# Patient Record
Sex: FEMALE | Race: WHITE
[De-identification: names, ages, dates, MRNs, and addresses within clinical notes are randomized per-mention and may not be internally consistent; named-entity substitution may affect disease eponyms.]

---

## 2017-03-09 ENCOUNTER — HOSPITAL ENCOUNTER (OUTPATIENT)
Dept: HOSPITAL 47 - RADMAMWWP | Age: 72
Discharge: HOME | End: 2017-03-09
Payer: MEDICARE

## 2017-03-09 DIAGNOSIS — Z12.31: Primary | ICD-10-CM

## 2017-03-09 PROCEDURE — 77063 BREAST TOMOSYNTHESIS BI: CPT

## 2017-03-13 NOTE — MM
Reason for exam: screening  (asymptomatic).

Last mammogram was performed 1 year and 1 month ago.



History:

Patient is postmenopausal.

Family history of breast cancer in aunt.

Took hormonal contraceptives for 3 years beginning at age 23.



Physical Findings:

A clinical breast exam by your physician is recommended on an annual basis and 

results should be correlated with mammographic findings.



MG 3D Screening Mammo W/Cad

Bilateral CC and MLO view(s) were taken.

Prior study comparison: February 10, 2016, bilateral MG 3d screening mammo w/cad. 

December 30, 2014, bilateral MG screening mammo w CAD.  Jen 3, 2013, bilateral 

digital screening mammo w/CAD.  April 29, 2011, bilateral digital screening mammo 

w/CAD.

The breast tissue is heterogeneously dense. This may lower the sensitivity of 

mammography.  There is chronic nodularity in the left breast. Asymmetric density 

far posteriorly and inferiorly in the right breast incompletely disperses.





ASSESSMENT: Incomplete: need additional imaging evaluation, BI-RAD 0



RECOMMENDATION:

Special view mammogram of the right breast.



If lesion persists on supplemental views, image directed ultrasound is 

recommended.



[facility] will attempt to contact patient to return for supplemental 

views and ultrasound if indicated.

## 2017-03-14 ENCOUNTER — HOSPITAL ENCOUNTER (OUTPATIENT)
Dept: HOSPITAL 47 - RADMAMWWP | Age: 72
End: 2017-03-14
Payer: MEDICARE

## 2017-03-14 DIAGNOSIS — R92.8: Primary | ICD-10-CM

## 2017-03-14 NOTE — MM
Reason for exam: additional evaluation requested from abnormal screening.

Last mammogram was performed less than 1 month ago.



History:

Patient is postmenopausal and has history of other cancer at age 70.

Family history of prostate cancer in cousin and breast cancer in aunt.

Took hormonal contraceptives for 3 years beginning at age 23.



Physical Findings:

Nurse Summary: thickening/palpable in the right breast at 9 o'clock (nurse kp).



MG 3D Work Up W/Cad RT

MLO and LM view(s) were taken of the right breast.

Prior study comparison: March 9, 2017, bilateral MG 3d screening mammo w/cad.  

February 10, 2016, bilateral MG 3d screening mammo w/cad.  December 30, 2014, 

bilateral MG screening mammo w CAD.

Asymmetric density does not persist as distinct lesion on additional views.



These results were verbally communicated with the patient and result sheet given 

to the patient on 3/14/17.





ASSESSMENT: Benign, BI-RAD 2



RECOMMENDATION:

Return to routine screening mammogram schedule for both breasts.

## 2017-11-06 ENCOUNTER — HOSPITAL ENCOUNTER (OUTPATIENT)
Dept: HOSPITAL 47 - RADCTMAIN | Age: 72
Discharge: HOME | End: 2017-11-06
Payer: MEDICARE

## 2017-11-06 DIAGNOSIS — C56.9: Primary | ICD-10-CM

## 2017-11-06 DIAGNOSIS — E27.9: ICD-10-CM

## 2017-11-06 DIAGNOSIS — K76.9: ICD-10-CM

## 2017-11-06 LAB
BUN SERPL-SCNC: 14 MG/DL (ref 7–17)
NON-AFRICAN AMERICAN GFR(MDRD): >60

## 2017-11-06 PROCEDURE — 82565 ASSAY OF CREATININE: CPT

## 2017-11-06 PROCEDURE — 71260 CT THORAX DX C+: CPT

## 2017-11-06 PROCEDURE — 74177 CT ABD & PELVIS W/CONTRAST: CPT

## 2017-11-06 PROCEDURE — 36415 COLL VENOUS BLD VENIPUNCTURE: CPT

## 2017-11-06 PROCEDURE — 84520 ASSAY OF UREA NITROGEN: CPT

## 2017-11-06 NOTE — CT
EXAMINATION TYPE: CT ChestAbdPelvis w con

 

DATE OF EXAM: 11/6/2017

 

COMPARISON: 11/11/2016

 

HISTORY: Follow up ovarian cancer.  Mid Abdominal pain for 2 weeks

 

CT DLP: 1690 mGycm. Automated Exposure Control for Dose Reduction was Utilized.

 

 

CONTRAST: 

CT scan of the thorax, abdomen and pelvis is performed with IV Contrast, patient injected with 100 mL
 of Omnipaque 300.

 

FINDINGS:

 

LUNGS: The lungs are grossly clear, there is no concerning parenchymal mass or nodule identified.   E
longated area within the right middle lobe on series 4 image 30 measures rThere 6 mm and is similar t
o the prior exam of 11/11/2016 and favored to represent chronic atelectasis or scarring. There is aga
in chronic right hemidiaphragm elevation and right basilar atelectasis. Is no pleural effusion or pne
umothorax seen.  The tracheobronchial tree is patent.

 

MEDIASTINUM: There are no greater than 1 cm hilar or mediastinal lymph nodes.   No pericardial effusi
on is seen.  

 

OTHER:  No additional significant abnormality is seen.

 

LIVER/GB: Within segment 7 of the right hepatic lobe there is a 7 mm hypoattenuated hepatic lesion th
at is similar in size to the prior exam of 11/11/2016. No new hepatic lesions are identified. No intr
ahepatic biliary ductal dilatation. No subcapsular implants are seen along the liver. No liver parenc
hymal involvement.

 

PANCREAS: No significant abnormality is seen.

 

SPLEEN: No significant abnormality is seen.

 

ADRENALS: Right adrenal gland nodule measures 2.0 x 2.2 cm and is not compatible with a simple cyst. 
This is minimally increased from the prior exam.

 

KIDNEYS: No significant abnormality is seen. Bilateral extrarenal pelvises are noted.

 

BOWEL: Postsurgical changes are seen at the rectosigmoid junction with decompression of the sigmoid c
olon. No evidence of bowel obstruction.

 

GENITAL ORGANS: Uterus is surgically absent. Ovaries are likely surgically absent

 

LYMPH NODES: No greater than 1cm abdominal or pelvic lymph nodes are appreciated.

 

OSSEOUS STRUCTURES: Mild multilevel degenerative changes of the thoracolumbar and lumbosacral spine a
re present without suspicious osseous lesion.

 

OTHER: In the region of the gastrohepatic ligament and just superior to the greater curvature and les
ser curvature of the stomach on series 3 image 51 and on series 5 image 25 there are innumerable vert
ebral soft tissue attenuated nodules which could represent omental metastasis, adenopathy or less lik
ely varices. Spleen is not enlarged and there are no other signs of portal venous hypertension.

 

IMPRESSION:

1. Numerous mesenteric nodules in the region of the gastrohepatic ligament and perihepatic regions wh
ich could represent adenopathy, omental metastasis or less likely varices. These are not well appreci
ated on the prior examination as there was abdominal ascites that has is now resolved.  

2. Stable singular hepatic lesion in comparison to exam of 11/11/2016. Continued surveillance is dee dee
mmended. No evidence of subserosal implants or liver parenchymal involvement in this patient with his
tory of ovarian cancer.

3. Minimally enlarging right adrenal gland nodule. Further characterization with MR could be performe
d if clinically indicated.

## 2018-01-22 ENCOUNTER — HOSPITAL ENCOUNTER (OUTPATIENT)
Dept: HOSPITAL 47 - RADCTMAIN | Age: 73
Discharge: HOME | End: 2018-01-22
Payer: MEDICARE

## 2018-01-22 DIAGNOSIS — Z98.890: ICD-10-CM

## 2018-01-22 DIAGNOSIS — K66.8: Primary | ICD-10-CM

## 2018-01-22 DIAGNOSIS — C56.9: ICD-10-CM

## 2018-01-22 DIAGNOSIS — M79.89: ICD-10-CM

## 2018-01-22 LAB — BUN SERPL-SCNC: 11 MG/DL (ref 7–17)

## 2018-01-22 PROCEDURE — 74177 CT ABD & PELVIS W/CONTRAST: CPT

## 2018-01-22 PROCEDURE — 36415 COLL VENOUS BLD VENIPUNCTURE: CPT

## 2018-01-22 PROCEDURE — 84520 ASSAY OF UREA NITROGEN: CPT

## 2018-01-22 PROCEDURE — 71260 CT THORAX DX C+: CPT

## 2018-01-22 PROCEDURE — 82565 ASSAY OF CREATININE: CPT

## 2018-01-22 NOTE — CT
EXAMINATION TYPE: CT ChestAbdPelvis w con

 

DATE OF EXAM: 1/22/2018

 

COMPARISON: Prior CT 11/6/2017

 

HISTORY: Patient complains of periumbilical pain.  Patient has known history of ovarian CA.

 

CT DLP: 1271.6 mGycm

Automated exposure control for dose reduction was used.

 

CONTRAST: 

CT scan of the chest, abdomen and pelvis is performed with Oral Contrast and with IV Contrast, patien
t injected with 100 mL of Omnipaque 300.

 

FINDINGS:

 

LUNGS: The lungs are stable. There is no pleural effusion or pneumothorax seen.  The tracheobronchial
 tree is patent.

 

MEDIASTINUM: There are no greater than 1 cm hilar or mediastinal lymph nodes.   No pericardial effusi
on is seen.  

 

AORTA:  No significant abnormality is seen.

 

OTHER:  Within the abdomen there are multiple nodular mesenteric soft tissue lesions similar to prior
 exam. At least one nodule on image 89 was not seen on prior exam and shows a short axis measurement 
of only 5 mm.

 

LIVER/GB: No significant interval change is appreciated.

 

PANCREAS: No significant abnormality is seen.

 

SPLEEN: No significant abnormality is seen.

 

ADRENALS: No significant interval change. Nodules are present right greater than left

 

KIDNEYS: Similar to prior.

 

REPRODUCTIVE ORGANS: Postop changes are present.

 

BOWEL:  Postop change noted in the rectosigmoid colon. Soft tissue nodule is present in the presacral
 region measuring 14 mm in short axis on image #98

 

FREE AIR: No Free Air visible.

 

ASCITES:  None seen.

 

RETROPERITONEAL ADENOPATHY:  No retroperitoneal adenopathy is seen.

 

LYMPH NODES: No greater than 1 cm abdominal or pelvic lymph nodes are appreciated.

 

URINARY BLADDER:  No significant abnormality is seen.

 

PELVIC ADENOPATHY:  None visualized.

 

OSSEOUS STRUCTURES:  No significant abnormality is seen.

 

IMPRESSION: Findings are similar to prior. There may be a slight increase in tumor burden.

## 2018-03-05 ENCOUNTER — HOSPITAL ENCOUNTER (OUTPATIENT)
Dept: HOSPITAL 47 - RADCTMAIN | Age: 73
Discharge: HOME | End: 2018-03-05
Payer: MEDICARE

## 2018-03-05 DIAGNOSIS — Z96.89: ICD-10-CM

## 2018-03-05 DIAGNOSIS — C56.9: ICD-10-CM

## 2018-03-05 DIAGNOSIS — E27.8: ICD-10-CM

## 2018-03-05 DIAGNOSIS — C78.7: Primary | ICD-10-CM

## 2018-03-05 LAB — BUN SERPL-SCNC: 19 MG/DL (ref 7–17)

## 2018-03-05 PROCEDURE — 82565 ASSAY OF CREATININE: CPT

## 2018-03-05 PROCEDURE — 36415 COLL VENOUS BLD VENIPUNCTURE: CPT

## 2018-03-05 PROCEDURE — 84520 ASSAY OF UREA NITROGEN: CPT

## 2018-03-05 PROCEDURE — 71260 CT THORAX DX C+: CPT

## 2018-03-05 PROCEDURE — 74177 CT ABD & PELVIS W/CONTRAST: CPT

## 2018-03-05 NOTE — CT
EXAMINATION TYPE: CT ChestAbdPelvis w con

 

DATE OF EXAM: 3/5/2018

 

COMPARISON: NONE

 

HISTORY: Ovarian cancer, follow up

 

CT DLP: 1355.1 mGycm. Automated Exposure Control for Dose Reduction was Utilized.

 

 

CONTRAST: 

CT scan of the thorax, abdomen and pelvis is performed with IV Contrast, patient injected with 100 mL
 of Omnipaque 300.

 

FINDINGS:

 

LUNGS: There is chronic right hemidiaphragm elevation is seen as prior images and right basilar subse
gmental atelectasis. Right middle lobe atelectasis is also seen as well as an elongated area of scarr
ing within the right middle lobe on series 4 image 28. This is stable from prior exams. The lungs are
 otherwise clear, there is no concerning parenchymal mass or nodule identified.   There is no pleural
 effusion or pneumothorax seen.  The tracheobronchial tree is patent.

 

MEDIASTINUM: There are no greater than 1 cm hilar or mediastinal lymph nodes.   No pericardial effusi
on is seen.  

 

OTHER:  There is a small hiatal hernia noted. 

 

LIVER/GB: New subcapsular implant is seen along the liver on series 3 image 46 measuring 4.5 x 2.4 ce
ntimeters with more crescentic area of fluid around the right hepatic lobe on series 3 image 46. This
 is elongated in transverse dimension greater than anterior posterior dimension representing a subser
osal implant rather than liver parenchymal invasion. The previously noted 7 mm hypoattenuated right h
epatic lobe lesion on series 3 image 37 has been stable since exam of 11/11/2016. Gallbladder surgica
lly absent.

 

PANCREAS: No significant abnormality is seen.

 

SPLEEN: No significant abnormality is seen.

 

ADRENALS: Again there is a right adrenal gland nodule measuring approximately 2.3 x 2.1 cm, not keenan
tible with a simple cyst. This is overall similar to the exam of 1/22/2018.

 

KIDNEYS: Extrarenal pelvises sees are again noted. Kidneys enhance symmetrically without hydronephros
is.

 

BOWEL:  Rectosigmoid anastomosis is again seen with mild presacral inflammatory fat stranding and are
a of soft tissue nodularity that is similar to the prior on series 3 image 96.

 

GENITAL ORGANS: Uterus is surgically absent and ovaries are presumed to be surgically absent as well.


 

LYMPH NODES/MESENTERY: The previously seen numerous mesenteric nodules and omental nodules within the
 gastrohepatic ligament, right lower quadrant mesentery, low pelvis centrally and anterior central me
sentery of the pelvis have increased in size. For example mesenteric nodule on series 3 image 67 edna
ures 1.2 x 1.4 cm and previously measured 6 x 5 mm.

 

OSSEOUS STRUCTURES: Moderate degenerative changes of the visualized spine, femoral acetabular joints,
 and sacroiliac joints are seen. Scattered femoral head probable bone islands are noted.

 

IMPRESSION:

1. Progression of disease. New subserosal hepatic metastatic implant without current liver parenchyma
l invasion measuring 4.5 x 2.4 cm and increase in size of the scattered mesenteric metastatic nodules
 (likely a combination of omental implants and adenopathy).

2. Stability of the right adrenal gland nodule.

3. No new evidence of metastatic disease within the chest.

## 2018-06-29 ENCOUNTER — HOSPITAL ENCOUNTER (OUTPATIENT)
Dept: HOSPITAL 47 - RADCTMAIN | Age: 73
Discharge: HOME | End: 2018-06-29
Payer: MEDICARE

## 2018-06-29 DIAGNOSIS — E27.8: ICD-10-CM

## 2018-06-29 DIAGNOSIS — K66.8: ICD-10-CM

## 2018-06-29 DIAGNOSIS — K76.9: ICD-10-CM

## 2018-06-29 DIAGNOSIS — J90: ICD-10-CM

## 2018-06-29 DIAGNOSIS — C56.9: Primary | ICD-10-CM

## 2018-06-29 LAB — BUN SERPL-SCNC: 17 MG/DL (ref 7–17)

## 2018-06-29 PROCEDURE — 82565 ASSAY OF CREATININE: CPT

## 2018-06-29 PROCEDURE — 74177 CT ABD & PELVIS W/CONTRAST: CPT

## 2018-06-29 PROCEDURE — 71260 CT THORAX DX C+: CPT

## 2018-06-29 PROCEDURE — 36415 COLL VENOUS BLD VENIPUNCTURE: CPT

## 2018-06-29 PROCEDURE — 84520 ASSAY OF UREA NITROGEN: CPT

## 2018-06-29 NOTE — CT
EXAMINATION TYPE: CT ChestAbdPelvis w con

 

DATE OF EXAM: 6/29/2018

 

COMPARISON: CT chest abdomen and pelvis March 5, 2018 and older studies.

 

HISTORY: Ovarian Cancer, Observe for Mets. Completed chemotherapy June 5, 2018. Originally diagnosed 
1.5 years ago.

 

CT DLP: 1781 mGycm. Automated Exposure Control for Dose Reduction was Utilized.

 

 

CONTRAST: 

CT scan of the thorax, abdomen and pelvis is performed with IV Contrast, patient injected with 100 ml
 mL of Isovue 300.

 

FINDINGS:

 

LUNGS: There are small right pleural effusion on current study increased in size from prior. No suspi
cious new parenchymal nodule or mass is present bilaterally. Elevated right hemidiaphragm is redemons
trated.

 

MEDIASTINUM: There are no greater than 1 cm hilar or mediastinal lymph nodes.   No pericardial effusi
on is seen.  Mild cardiomegaly is redemonstrated.

 

OTHER: There is stable prominent tissue medial inferior left breast axial image 31 not significantly 
changed from November 11, 2016 CT

 

LIVER/GB: Small amount of perihepatic fluid laterally is redemonstrated slightly less prominent. Ante
rior component is improved. There is stable subcentimeter hypodense lesion posterior right hepatic do
me axial image 37 unchanged back to November 11, 2016 CT. Cholecystectomy clips are redemonstrated.

 

PANCREAS: No significant abnormality is seen.

 

SPLEEN: There is 1.1 cm splenic lesion axial image 51 stable or slightly more prominent from prior st
udy and new from 2016.

 

ADRENALS: Right adrenal mass measures 2.3 x 1.8 cm current study axial image 50 not significantly jonathan
nged from priors.

 

KIDNEYS: No significant abnormality is seen.

 

BOWEL: Oral contrast reaches level of hepatic flexure. There is no suspicious small or large bowel di
latation. There is lipomatous hypertrophy at the ileocecal valve redemonstrated. There are surgical s
utures near sigmoid rectal junction axial image 101 redemonstrated.

 

GENITAL ORGANS: Uterus is surgically absent.

 

LYMPH NODES: Irregular mesenteric nodular thickening is redemonstrated difficult to accurately measur
e due to linear extension. No significant change from most recent study seen best near axial image 86
. For reference a nodular component measuring 1.0 cm long axis axial image 81 is not significantly ch
anged from prior study axial image 74. Omental nodularity seen on November 2016 study is less well id
entified on current or most recent prior study.

 

OSSEOUS STRUCTURES: There is moderate multilevel spurring in the visualized spine. There is multileve
l disc space narrowing and vacuum disc phenomenon. There is moderate to severe joint space loss as we
ll as subchondral cystic change and spurring in both hips.

 

OTHER: No significant additional abnormality is seen.

 

IMPRESSION:

1. New or increasing size small right pleural effusion on current exam.

2. Nonvisualization of anterior subserosal liver lesion current study suggests positive treatment res
ponse or resolution of nondependent fluid collection. No new abdominal ascites or omental caking is e
vident.  Stable nonspecific mesenteric nodularity and irregularity. Stable right adrenal mass.

## 2018-07-10 ENCOUNTER — HOSPITAL ENCOUNTER (OUTPATIENT)
Dept: HOSPITAL 47 - RADECHMAIN | Age: 73
Discharge: HOME | End: 2018-07-10
Attending: INTERNAL MEDICINE
Payer: MEDICARE

## 2018-07-10 DIAGNOSIS — I08.0: ICD-10-CM

## 2018-07-10 DIAGNOSIS — R93.1: ICD-10-CM

## 2018-07-10 DIAGNOSIS — I48.91: ICD-10-CM

## 2018-07-10 DIAGNOSIS — C56.9: ICD-10-CM

## 2018-07-10 DIAGNOSIS — Z01.818: Primary | ICD-10-CM

## 2018-07-10 PROCEDURE — 93306 TTE W/DOPPLER COMPLETE: CPT

## 2018-07-11 NOTE — ECHOF
Referral Reason:Z01.818 Chemo, C56.9 Ovarian Cancer



MEASUREMENTS

--------

HEIGHT: 160.0 cm

WEIGHT: 95.3 kg

BP: 

RVIDd:   2.7 cm     (< 3.3)

IVSd:   0.9 cm     (0.6 - 1.1)

LVIDd:   5.3 cm     (3.9 - 5.3)

LVPWd:   1.1 cm     (0.6 - 1.1)

IVSs:   1.2 cm

LVIDs:   4.0 cm

LVPWs:   1.2 cm

LAESV Index (A-L):   40.80 ml/m

Ao Diam:   2.8 cm     (2.0 - 3.7)

AV Cusp:   1.7 cm     (1.5 - 2.6)

LA Diam:   4.0 cm     (2.7 - 3.8)

EPSS:   0.9 cm

MV E Robert:   0.73 m/s

MV DecT:   252 ms

MV A Robert:   0.78 m/s

MV E/A Ratio:   0.93 

RAP:   5.00 mmHg

RVSP:   24.85 mmHg

MV EF SLOPE:   93.57 mm/s     (70 - 150)

MV EXCURSION:   1.35 cm     (> 18.000)







FINDINGS

--------

Atrial fibrillation.

This was a technically adequate study.

The left ventricular size is normal.   Left ventricular wall thickness is normal.   There is mild madiha
bal hypokinesis of LV .   Overall left ventricular systolic function is mildly impaired with, an EF b
etween 45 - 50 %.

The right ventricle is normal in size and function.

LA is severely dilated >40 ml/m2

RA appears enlarged.

Aortic valve is trileaflet and is mildly thickened.   Trace amount of aortic regurgitation.    There 
is no evidence of aortic stenosis.

The mitral valve leaflets are mildly thickened.   Mild mitral regurgitation is present.

Trace tricuspid regurgitation present.   Right ventricular systolic pressure is normal at < 35 mmHg. 
  There is no evidence of pulmonary hypertension.

Trace/mild (physiologic)  pulmonic regurgitation.

The aortic root size is normal.

Normal inferior vena cava with normal inspiratory collapse consistent with estimated right atrial pre
ssure of  5 mmHg.

There is no pericardial effusion.



CONCLUSIONS

--------

1. Atrial fibrillation.

2. This was a technically adequate study.

3. The left ventricular size is normal.

4. Left ventricular wall thickness is normal.

5. There is mild global hypokinesis of LV .

6. Overall left ventricular systolic function is mildly impaired with, an EF between 45 - 50 %.

7. LA is severely dilated >40 ml/m2

8. RA appears enlarged.

9. Aortic valve is trileaflet and is mildly thickened.

10. Trace amount of aortic regurgitation.

11. The mitral valve leaflets are mildly thickened.

12. Mild mitral regurgitation is present.

13. Trace tricuspid regurgitation present.

14. Right ventricular systolic pressure is normal at < 35 mmHg.

15. There is no evidence of pulmonary hypertension.

16. Trace/mild (physiologic)  pulmonic regurgitation.

17. The aortic root size is normal.

18. There is no pericardial effusion.





SONOGRAPHER: Ezequiel Reeder RDCS

## 2018-09-04 ENCOUNTER — HOSPITAL ENCOUNTER (INPATIENT)
Dept: HOSPITAL 47 - EC | Age: 73
LOS: 3 days | Discharge: HOME | DRG: 308 | End: 2018-09-07
Attending: HOSPITALIST | Admitting: HOSPITALIST
Payer: MEDICARE

## 2018-09-04 VITALS — BODY MASS INDEX: 37.4 KG/M2

## 2018-09-04 DIAGNOSIS — B37.0: ICD-10-CM

## 2018-09-04 DIAGNOSIS — Z90.49: ICD-10-CM

## 2018-09-04 DIAGNOSIS — Z80.1: ICD-10-CM

## 2018-09-04 DIAGNOSIS — J98.11: ICD-10-CM

## 2018-09-04 DIAGNOSIS — C77.2: ICD-10-CM

## 2018-09-04 DIAGNOSIS — I48.92: ICD-10-CM

## 2018-09-04 DIAGNOSIS — Z90.710: ICD-10-CM

## 2018-09-04 DIAGNOSIS — C56.9: ICD-10-CM

## 2018-09-04 DIAGNOSIS — Z88.5: ICD-10-CM

## 2018-09-04 DIAGNOSIS — Z98.51: ICD-10-CM

## 2018-09-04 DIAGNOSIS — E66.9: ICD-10-CM

## 2018-09-04 DIAGNOSIS — Z79.01: ICD-10-CM

## 2018-09-04 DIAGNOSIS — C50.919: ICD-10-CM

## 2018-09-04 DIAGNOSIS — E78.5: ICD-10-CM

## 2018-09-04 DIAGNOSIS — B36.9: ICD-10-CM

## 2018-09-04 DIAGNOSIS — Z79.899: ICD-10-CM

## 2018-09-04 DIAGNOSIS — I50.9: ICD-10-CM

## 2018-09-04 DIAGNOSIS — T45.1X5A: ICD-10-CM

## 2018-09-04 DIAGNOSIS — J90: ICD-10-CM

## 2018-09-04 DIAGNOSIS — C78.6: ICD-10-CM

## 2018-09-04 DIAGNOSIS — D61.810: ICD-10-CM

## 2018-09-04 DIAGNOSIS — R18.0: ICD-10-CM

## 2018-09-04 DIAGNOSIS — I48.2: Primary | ICD-10-CM

## 2018-09-04 DIAGNOSIS — Z83.3: ICD-10-CM

## 2018-09-04 DIAGNOSIS — Z82.49: ICD-10-CM

## 2018-09-04 DIAGNOSIS — E83.42: ICD-10-CM

## 2018-09-04 DIAGNOSIS — Z86.711: ICD-10-CM

## 2018-09-04 LAB
ALBUMIN SERPL-MCNC: 3.8 G/DL (ref 3.5–5)
ALP SERPL-CCNC: 86 U/L (ref 38–126)
ALT SERPL-CCNC: 27 U/L (ref 9–52)
ANION GAP SERPL CALC-SCNC: 11 MMOL/L
APTT BLD: 22 SEC (ref 22–30)
AST SERPL-CCNC: 32 U/L (ref 14–36)
BASOPHILS # BLD AUTO: 0 K/UL (ref 0–0.2)
BASOPHILS NFR BLD AUTO: 1 %
BUN SERPL-SCNC: 23 MG/DL (ref 7–17)
CALCIUM SPEC-MCNC: 9 MG/DL (ref 8.4–10.2)
CHLORIDE SERPL-SCNC: 107 MMOL/L (ref 98–107)
CK SERPL-CCNC: 46 U/L (ref 30–135)
CK SERPL-CCNC: 50 U/L (ref 30–135)
CO2 SERPL-SCNC: 24 MMOL/L (ref 22–30)
EOSINOPHIL # BLD AUTO: 0 K/UL (ref 0–0.7)
EOSINOPHIL NFR BLD AUTO: 1 %
ERYTHROCYTE [DISTWIDTH] IN BLOOD BY AUTOMATED COUNT: 4.09 M/UL (ref 3.8–5.4)
ERYTHROCYTE [DISTWIDTH] IN BLOOD: 18 % (ref 11.5–15.5)
GLUCOSE SERPL-MCNC: 112 MG/DL (ref 74–99)
HCT VFR BLD AUTO: 40 % (ref 34–46)
HGB BLD-MCNC: 12.7 GM/DL (ref 11.4–16)
INR PPP: 1.2 (ref ?–1.2)
LYMPHOCYTES # SPEC AUTO: 0.9 K/UL (ref 1–4.8)
LYMPHOCYTES NFR SPEC AUTO: 40 %
MAGNESIUM SPEC-SCNC: 1.2 MG/DL (ref 1.6–2.3)
MCH RBC QN AUTO: 30.9 PG (ref 25–35)
MCHC RBC AUTO-ENTMCNC: 31.7 G/DL (ref 31–37)
MCV RBC AUTO: 97.7 FL (ref 80–100)
MONOCYTES # BLD AUTO: 0.2 K/UL (ref 0–1)
MONOCYTES NFR BLD AUTO: 10 %
NEUTROPHILS # BLD AUTO: 1 K/UL (ref 1.3–7.7)
NEUTROPHILS NFR BLD AUTO: 45 %
PLATELET # BLD AUTO: 74 K/UL (ref 150–450)
POTASSIUM SERPL-SCNC: 4.7 MMOL/L (ref 3.5–5.1)
PROT SERPL-MCNC: 6.6 G/DL (ref 6.3–8.2)
PT BLD: 11.7 SEC (ref 9–12)
SODIUM SERPL-SCNC: 142 MMOL/L (ref 137–145)
T4 FREE SERPL-MCNC: 2.04 NG/DL (ref 0.78–2.19)
TROPONIN I SERPL-MCNC: 0.01 NG/ML (ref 0–0.03)
TROPONIN I SERPL-MCNC: 0.01 NG/ML (ref 0–0.03)
WBC # BLD AUTO: 2.2 K/UL (ref 3.8–10.6)

## 2018-09-04 PROCEDURE — 84481 FREE ASSAY (FT-3): CPT

## 2018-09-04 PROCEDURE — 84439 ASSAY OF FREE THYROXINE: CPT

## 2018-09-04 PROCEDURE — 85025 COMPLETE CBC W/AUTO DIFF WBC: CPT

## 2018-09-04 PROCEDURE — 88305 TISSUE EXAM BY PATHOLOGIST: CPT

## 2018-09-04 PROCEDURE — 96366 THER/PROPH/DIAG IV INF ADDON: CPT

## 2018-09-04 PROCEDURE — 83615 LACTATE (LD) (LDH) ENZYME: CPT

## 2018-09-04 PROCEDURE — 71045 X-RAY EXAM CHEST 1 VIEW: CPT

## 2018-09-04 PROCEDURE — 80048 BASIC METABOLIC PNL TOTAL CA: CPT

## 2018-09-04 PROCEDURE — 85730 THROMBOPLASTIN TIME PARTIAL: CPT

## 2018-09-04 PROCEDURE — 96368 THER/DIAG CONCURRENT INF: CPT

## 2018-09-04 PROCEDURE — 93308 TTE F-UP OR LMTD: CPT

## 2018-09-04 PROCEDURE — 84484 ASSAY OF TROPONIN QUANT: CPT

## 2018-09-04 PROCEDURE — 36415 COLL VENOUS BLD VENIPUNCTURE: CPT

## 2018-09-04 PROCEDURE — 83735 ASSAY OF MAGNESIUM: CPT

## 2018-09-04 PROCEDURE — 88342 IMHCHEM/IMCYTCHM 1ST ANTB: CPT

## 2018-09-04 PROCEDURE — 84157 ASSAY OF PROTEIN OTHER: CPT

## 2018-09-04 PROCEDURE — 84443 ASSAY THYROID STIM HORMONE: CPT

## 2018-09-04 PROCEDURE — 82465 ASSAY BLD/SERUM CHOLESTEROL: CPT

## 2018-09-04 PROCEDURE — 96375 TX/PRO/DX INJ NEW DRUG ADDON: CPT

## 2018-09-04 PROCEDURE — 86304 IMMUNOASSAY TUMOR CA 125: CPT

## 2018-09-04 PROCEDURE — 99291 CRITICAL CARE FIRST HOUR: CPT

## 2018-09-04 PROCEDURE — 82550 ASSAY OF CK (CPK): CPT

## 2018-09-04 PROCEDURE — 88108 CYTOPATH CONCENTRATE TECH: CPT

## 2018-09-04 PROCEDURE — 88341 IMHCHEM/IMCYTCHM EA ADD ANTB: CPT

## 2018-09-04 PROCEDURE — 80053 COMPREHEN METABOLIC PANEL: CPT

## 2018-09-04 PROCEDURE — 96365 THER/PROPH/DIAG IV INF INIT: CPT

## 2018-09-04 PROCEDURE — 82945 GLUCOSE OTHER FLUID: CPT

## 2018-09-04 PROCEDURE — 89050 BODY FLUID CELL COUNT: CPT

## 2018-09-04 PROCEDURE — 84132 ASSAY OF SERUM POTASSIUM: CPT

## 2018-09-04 PROCEDURE — 82553 CREATINE MB FRACTION: CPT

## 2018-09-04 PROCEDURE — 85610 PROTHROMBIN TIME: CPT

## 2018-09-04 PROCEDURE — 83880 ASSAY OF NATRIURETIC PEPTIDE: CPT

## 2018-09-04 PROCEDURE — 71046 X-RAY EXAM CHEST 2 VIEWS: CPT

## 2018-09-04 PROCEDURE — 96376 TX/PRO/DX INJ SAME DRUG ADON: CPT

## 2018-09-04 RX ADMIN — DILTIAZEM HYDROCHLORIDE SCH MLS/HR: 5 INJECTION INTRAVENOUS at 15:34

## 2018-09-04 RX ADMIN — MAGNESIUM SULFATE IN DEXTROSE SCH MLS/HR: 10 INJECTION, SOLUTION INTRAVENOUS at 20:00

## 2018-09-04 RX ADMIN — METOPROLOL TARTRATE SCH MG: 50 TABLET, FILM COATED ORAL at 21:47

## 2018-09-04 RX ADMIN — MAGNESIUM SULFATE IN DEXTROSE SCH MLS/HR: 10 INJECTION, SOLUTION INTRAVENOUS at 17:36

## 2018-09-04 RX ADMIN — Medication SCH MG: at 20:00

## 2018-09-04 RX ADMIN — LISINOPRIL SCH MG: 10 TABLET ORAL at 21:47

## 2018-09-04 RX ADMIN — CEFAZOLIN SCH MLS/HR: 330 INJECTION, POWDER, FOR SOLUTION INTRAMUSCULAR; INTRAVENOUS at 17:36

## 2018-09-04 RX ADMIN — APIXABAN SCH MG: 5 TABLET, FILM COATED ORAL at 21:47

## 2018-09-04 RX ADMIN — DILTIAZEM HYDROCHLORIDE SCH MLS/HR: 5 INJECTION INTRAVENOUS at 20:00

## 2018-09-04 NOTE — HP
HISTORY AND PHYSICAL



DATE OF ADMISSION:

09/04/2018



DATE OF SERVICE:

09/04/2018



PRESENTING COMPLAINT:

Short of breath.



HISTORY OF PRESENTING COMPLAINT:

This is a very pleasant 72-year-old patient who follows with Dr. Garibay.  The

patient's oncologist is Dr. Hillman. The patient has a diagnosis of ovarian cancer.  The

patient is getting a third category of chemotherapy and she says she is going through a

second cycle.  She had gone for a checkup to him today.  She had been getting short of

breath for about a week, very subtle fluttering; no dizziness; no lightheadedness; very

mild edema.  No chest pressure.  She was found to be in atrial flutter/fibrillation

with a rapid ventricular rate and was hence sent down to the ER. The patient was

started on IV Cardizem drip.  Still the patient is running in the 110s to the 130s,

though patient feels a bit better.  No chest pain.  Cardiology is consulted for the

same.



REVIEW OF SYSTEMS:

CONSTITUTIONAL: Tired.

HEENT:  None.

RESPIRATORY: As above.

CARDIOVASCULAR: As above.

GASTROINTESTINAL: None.

GENITOURINARY: Urinary incontinence.

DERMATOLOGICAL: None.

HEMATOLOGICAL: None.

LYMPHATICS: None.

PSYCHIATRY: None.

NEUROLOGICAL: None.



PAST MEDICAL HISTORY:

1. Ovarian cancer, being followed by Dr. Hillman.

2. Hypertension.

3. Hyperlipidemia.

4. Atrial flutter.



PAST SURGICAL HISTORY:

1. Appendectomy.

2. Cholecystectomy.

3. Hysterectomy.

4. Tonsillectomy.

5. Tubal ligation.

6. A couple of D&C.



SOCIAL HISTORY:

. Alcohol rarely.  Does not smoke.



FAMILY HISTORY:

Atrial fibrillation, diabetes, hypertension.



HOME MEDICATIONS:

1. Lasix 20 mg p.o. daily p.r.n.

2. Lopressor 50 mg p.o. b.i.d.

3. Zestril 10 mg p.o. b.i.d.

4. Vitamin D3 2000 units p.o. daily.

5. Calcium 600 mg p.o. daily.

6. Eliquis 5 mg p.o. b.i.d.



ALLERGIES:

DILAUDID.



PHYSICAL EXAMINATION:

VITAL SIGNS ON PRESENTATION: Temperature 97.4, pulse 137, respiration 16, blood

pressure 150/101, pulse ox 95% on room air.

GENERAL APPEARANCE:  Well built; BMI 37.5. Sitting up, not in distress.

EYES: Pupils equal. Conjunctivae normal.

HEENT: External appearance of nose and ears normal. Oral cavity normal.

NECK: JVD unable to assess.  Mass not palpable.

RESPIRATORY: Effort normal.

LUNGS: Fair air entry.

CARDIOVASCULAR:  Heart sounds irregular. Minimal edema.

ABDOMEN: Distended, soft. Liver and spleen not palpable.

LYMPHATIC: No lymph node palpable in neck or axillae.

PSYCHIATRY: Alert and oriented x3. Mood and affect normal.

NEUROLOGICAL:  Pupils equal. Cranial nerves grossly intact. Power and sensation grossly

intact.



INVESTIGATIONS:

White count 2.2, hemoglobin 12.7, platelets 74.  Potassium 4.7, BUN 23, creatinine 0.9.

Troponin 0.012, 0.014.  TSH is normal.  EKG tracing interpreted by me shows atrial

flutter/fibrillation with _____. Chest x-ray film interpreted by me shows some

cardiomegaly, right pleural effusion, venous prominence.



ASSESSMENT:

1. Persistent atrial flutter/fibrillation with rapid ventricular rate.

2. Acute congestive heart failure exacerbation, possibly precipitated by atrial

    fibrillation; currently ejection fraction is not known.

3. Essential hypertension.

4. Hyperlipidemia.

5. Obesity; body mass index 37.5.

6. Breast cancer, being treated with chemotherapy.

7. Bicytopenia secondary to chemotherapy.



PLAN:

The patient is currently on a Cardizem drip.  She is on Lopressor 50 mg b.i.d., which

is her home dose.  Will order a 2-D echocardiogram.  Start the patient on IV Lasix.

Care was discussed with the patient.  Questions were answered.





MMODL / IJN: [id number] / Job#: [id number]

## 2018-09-04 NOTE — ED
General Adult HPI





- General


Chief complaint: Shortness of Breath


Stated complaint: heart rate-sent by Dr. Hillman


Time Seen by Provider: 18 14:51


Source: patient, family, RN notes reviewed


Mode of arrival: ambulatory


Limitations: no limitations





- History of Present Illness


Initial comments: 





Patient is a pleasant 72-year-old female presenting to the emergency department 

with concerns for tachycardia.  Patient was seen today by Dr. Hillman who advised 

her to come to the emergency department.  He did call and speak with me.  

Patient does see Dr. Hillman for a known history of ovarian cancer and is on 

chemotherapy, last dose was close to one month ago.  Patient has been having 

aches.  Seen some palpitations over the past several days.  Heart rate has been 

between 101 20.  No chest pain.  Patient is starting to feel a little short of 

breath over the past several days.  Patient does have some minimal leg 

swelling.  No calf tenderness.  No fevers.  No cough.  Patient has a known 

history of atrial fibrillation and is on Eliquis.





- Related Data


 Home Medications











 Medication  Instructions  Recorded  Confirmed


 


Calcium Carbonate [Calcium] 600 mg PO DAILY #0 16


 


Cholecalciferol [Vitamin D3] 2,000 unit PO DAILY 16


 


Metoprolol Tartrate [Lopressor] 50 mg PO BID 10/20/16 09/04/18


 


Furosemide [Lasix] 20 mg PO DAILY PRN 18


 


Lisinopril [Zestril] 10 mg PO BID 18








 Previous Rx's











 Medication  Instructions  Recorded


 


Apixaban [Eliquis] 5 mg PO BID #30 tab 08/10/16











 Allergies











Allergy/AdvReac Type Severity Reaction Status Date / Time


 


No Known Allergies Allergy   Verified 18 15:54














Review of Systems


ROS Statement: 


Those systems with pertinent positive or pertinent negative responses have been 

documented in the HPI.





ROS Other: All systems not noted in ROS Statement are negative.


Constitutional: Denies: fever


Eyes: Denies: eye pain


ENT: Denies: ear pain


Respiratory: Reports: dyspnea.  Denies: cough


Cardiovascular: Reports: palpitations.  Denies: chest pain


Endocrine: Reports: fatigue


Gastrointestinal: Denies: abdominal pain


Genitourinary: Denies: dysuria


Musculoskeletal: Denies: back pain


Skin: Denies: rash


Neurological: Denies: weakness





Past Medical History


Past Medical History: Atrial Fibrillation, Hyperlipidemia, Hypertension


History of Any Multi-Drug Resistant Organisms: None Reported


Past Surgical History: Appendectomy, Cholecystectomy, Hysterectomy, 

Tonsillectomy, Tubal Ligation


Additional Past Surgical History / Comment(s): colonoscopies, cervical 

polypectomy, couple of D&Cs, debulking


Past Anesthesia/Blood Transfusion Reactions: No Reported Reaction


Past Psychological History: No Psychological Hx Reported


Smoking Status: Never smoker


Past Alcohol Use History: Rare


Past Drug Use History: None Reported





- Past Family History


  ** Mother


Family Medical History: AFIB, Chest Pain / Angina, Diabetes Mellitus, 

Hypertension


Additional Family Medical History / Comment(s): Mother is 90 yrs old.





  ** Father


Family Medical History: Cancer


Additional Family Medical History / Comment(s): Father  of lung cancer at 

the age of 78yrs.





General Exam


Limitations: no limitations


General appearance: alert, in no apparent distress


Head exam: Present: atraumatic


Eye exam: Present: normal appearance, PERRL


ENT exam: Present: normal oropharynx


Neck exam: Present: normal inspection


Respiratory exam: Present: normal lung sounds bilaterally


Cardiovascular Exam: Present: tachycardia, irregular rhythm


GI/Abdominal exam: Present: soft.  Absent: tenderness


Extremities exam: Present: pedal edema (+1 bilaterally).  Absent: calf 

tenderness


Neurological exam: Present: alert


Psychiatric exam: Present: normal affect, normal mood


Skin exam: Present: normal color





Course


 Vital Signs











  18





  14:41 14:51 14:52


 


Temperature 97.4 F L  


 


Pulse Rate 137 H  


 


Pulse Rate [   155 H





Cardiac Monitor   





]   


 


Respiratory 16 16 





Rate   


 


Blood Pressure 150/101  


 


O2 Sat by Pulse 95  





Oximetry   














  18





  15:34 15:58 16:04


 


Temperature   


 


Pulse Rate 137 H 133 H 120 H


 


Pulse Rate [   





Cardiac Monitor   





]   


 


Respiratory 16  16





Rate   


 


Blood Pressure 149/107 106/73 134/73


 


O2 Sat by Pulse 98  





Oximetry   














  18





  16:14


 


Temperature 


 


Pulse Rate 100


 


Pulse Rate [ 





Cardiac Monitor 





] 


 


Respiratory 





Rate 


 


Blood Pressure 124/81


 


O2 Sat by Pulse 





Oximetry 














EKG Findings





- EKG Comments:


EKG Findings:: A. fib with rate of 142.  .  .  .  Normal 

axis.RSR in lead V1.  Inferior T wave inversion.  There is also T-wave 

inversion leads V3 through V6.





Medical Decision Making





- Medical Decision Making





Patient reevaluated and resting comfortably in bed.  Patient and family updated 

on results and plan.  Dr. Edwards has been paged for admission for Dr. Garibay.





- Lab Data


Result diagrams: 


 18 15:17





 18 15:17


 Lab Results











  18 Range/Units





  15:17 15:17 15:17 


 


WBC   2.2 L   (3.8-10.6)  k/uL


 


RBC   4.09   (3.80-5.40)  m/uL


 


Hgb   12.7   (11.4-16.0)  gm/dL


 


Hct   40.0   (34.0-46.0)  %


 


MCV   97.7   (80.0-100.0)  fL


 


MCH   30.9   (25.0-35.0)  pg


 


MCHC   31.7   (31.0-37.0)  g/dL


 


RDW   18.0 H   (11.5-15.5)  %


 


Plt Count   74 L   (150-450)  k/uL


 


Neutrophils %   45   %


 


Lymphocytes %   40   %


 


Monocytes %   10   %


 


Eosinophils %   1   %


 


Basophils %   1   %


 


Neutrophils #   1.0 L   (1.3-7.7)  k/uL


 


Lymphocytes #   0.9 L   (1.0-4.8)  k/uL


 


Monocytes #   0.2   (0-1.0)  k/uL


 


Eosinophils #   0.0   (0-0.7)  k/uL


 


Basophils #   0.0   (0-0.2)  k/uL


 


Manual Slide Review   Performed   


 


Polychromasia   Present   


 


Poikilocytosis (manual   Present   


 


Anisocytosis   Slight   


 


Macrocytosis   Slight   


 


PT     (9.0-12.0)  sec


 


INR     (<1.2)  


 


APTT     (22.0-30.0)  sec


 


Sodium    142  (137-145)  mmol/L


 


Potassium    4.7  (3.5-5.1)  mmol/L


 


Chloride    107  ()  mmol/L


 


Carbon Dioxide    24  (22-30)  mmol/L


 


Anion Gap    11  mmol/L


 


BUN    23 H  (7-17)  mg/dL


 


Creatinine    0.90  (0.52-1.04)  mg/dL


 


Est GFR (CKD-EPI)AfAm    75  (>60 ml/min/1.73 sqM)  


 


Est GFR (CKD-EPI)NonAf    65  (>60 ml/min/1.73 sqM)  


 


Glucose    112 H  (74-99)  mg/dL


 


Calcium    9.0  (8.4-10.2)  mg/dL


 


Magnesium    1.2 L  (1.6-2.3)  mg/dL


 


Total Bilirubin    0.8  (0.2-1.3)  mg/dL


 


AST    32  (14-36)  U/L


 


ALT    27  (9-52)  U/L


 


Alkaline Phosphatase    86  ()  U/L


 


Total Creatine Kinase  50    ()  U/L


 


CK-MB (CK-2)  0.5    (0.0-2.4)  ng/mL


 


CK-MB (CK-2) Rel Index  1.0    


 


Troponin I  0.012    (0.000-0.034)  ng/mL


 


NT-Pro-B Natriuret Pep     pg/mL


 


Total Protein    6.6  (6.3-8.2)  g/dL


 


Albumin    3.8  (3.5-5.0)  g/dL


 


TSH    2.870  (0.465-4.680)  mIU/L


 


Free T4    2.04  (0.78-2.19)  ng/dL


 


Free T3 pg/mL    4.2  (2.8-5.3)  pg/ml














  18 Range/Units





  15:17 15:17 


 


WBC    (3.8-10.6)  k/uL


 


RBC    (3.80-5.40)  m/uL


 


Hgb    (11.4-16.0)  gm/dL


 


Hct    (34.0-46.0)  %


 


MCV    (80.0-100.0)  fL


 


MCH    (25.0-35.0)  pg


 


MCHC    (31.0-37.0)  g/dL


 


RDW    (11.5-15.5)  %


 


Plt Count    (150-450)  k/uL


 


Neutrophils %    %


 


Lymphocytes %    %


 


Monocytes %    %


 


Eosinophils %    %


 


Basophils %    %


 


Neutrophils #    (1.3-7.7)  k/uL


 


Lymphocytes #    (1.0-4.8)  k/uL


 


Monocytes #    (0-1.0)  k/uL


 


Eosinophils #    (0-0.7)  k/uL


 


Basophils #    (0-0.2)  k/uL


 


Manual Slide Review    


 


Polychromasia    


 


Poikilocytosis (manual    


 


Anisocytosis    


 


Macrocytosis    


 


PT   11.7  (9.0-12.0)  sec


 


INR   1.2 H  (<1.2)  


 


APTT   22.0  (22.0-30.0)  sec


 


Sodium    (137-145)  mmol/L


 


Potassium    (3.5-5.1)  mmol/L


 


Chloride    ()  mmol/L


 


Carbon Dioxide    (22-30)  mmol/L


 


Anion Gap    mmol/L


 


BUN    (7-17)  mg/dL


 


Creatinine    (0.52-1.04)  mg/dL


 


Est GFR (CKD-EPI)AfAm    (>60 ml/min/1.73 sqM)  


 


Est GFR (CKD-EPI)NonAf    (>60 ml/min/1.73 sqM)  


 


Glucose    (74-99)  mg/dL


 


Calcium    (8.4-10.2)  mg/dL


 


Magnesium    (1.6-2.3)  mg/dL


 


Total Bilirubin    (0.2-1.3)  mg/dL


 


AST    (14-36)  U/L


 


ALT    (9-52)  U/L


 


Alkaline Phosphatase    ()  U/L


 


Total Creatine Kinase    ()  U/L


 


CK-MB (CK-2)    (0.0-2.4)  ng/mL


 


CK-MB (CK-2) Rel Index    


 


Troponin I    (0.000-0.034)  ng/mL


 


NT-Pro-B Natriuret Pep  6070   pg/mL


 


Total Protein    (6.3-8.2)  g/dL


 


Albumin    (3.5-5.0)  g/dL


 


TSH    (0.465-4.680)  mIU/L


 


Free T4    (0.78-2.19)  ng/dL


 


Free T3 pg/mL    (2.8-5.3)  pg/ml














- Radiology Data


Radiology results: image reviewed (Chest x-ray shows right-sided effusion and 

small left-sided effusion.)





Critical Care Time


Critical Care Time: Yes


Total Critical Care Time: 33





Disposition


Clinical Impression: 


 Atrial fibrillation with RVR, Hypomagnesemia, Pleural effusion





Disposition: ADMITTED AS IP TO THIS [facility]


Is patient prescribed a controlled substance at d/c from ED?: No


Referrals: 


Hermelindo Garibay DO [Primary Care Provider] - 1-2 days


Decision Time: 16:34

## 2018-09-04 NOTE — XR
EXAMINATION: XR chest 2V

 

DATE AND TIME:  9/4/2018 3:48 PM

 

CLINICAL INDICATION: dysrhythmia, shortness of breath

 

TECHNIQUE: AP and lateral

 

COMPARISON: 8/8/2016

 

FINDINGS: 

The pleural effusions seen on the prior study of increased. 

 

On the right, a large pleural effusion previously was seen level that the infrahilar position, now it
 is at the hilar level. There is associated passive atelectasis of the right lower lobe and right mid
dle lobe, with mild partial right upper lobe atelectasis. No right-sided pneumothorax.

    

On the left the formerly mild pleural effusion is now mild-to-moderate in its volume. There is associ
ated partial passive atelectasis of the left lower lobe. No left-sided pneumothorax.

 

Evaluation of the inflated lungs shows relatively normal arborization of the pulmonary vasculature bi
laterally there is engorgement of the pulmonary vasculature centrally, but no reticular or alveolar s
pace process.

 

Moderately enlarged cardiac silhouette and mild tortuosity of the thoracic aorta redemonstrated.

 

Bones and soft tissues are negative for acute findings.

 

IMPRESSION: 

INCREASING PLEURAL EFFUSIONS, MARKED ON THE RIGHT AND MILD-TO-MODERATE ON THE LEFT; ASSOCIATED BILATE
RAL PASSIVE ATELECTASIS PATTERN.

## 2018-09-05 LAB
CK SERPL-CCNC: 50 U/L (ref 30–135)
TROPONIN I SERPL-MCNC: <0.012 NG/ML (ref 0–0.03)

## 2018-09-05 RX ADMIN — METOPROLOL TARTRATE SCH MG: 50 TABLET, FILM COATED ORAL at 07:48

## 2018-09-05 RX ADMIN — Medication SCH MG: at 07:48

## 2018-09-05 RX ADMIN — NYSTATIN SCH: 100000 POWDER TOPICAL at 21:26

## 2018-09-05 RX ADMIN — APIXABAN SCH MG: 5 TABLET, FILM COATED ORAL at 07:47

## 2018-09-05 RX ADMIN — LISINOPRIL SCH MG: 10 TABLET ORAL at 07:48

## 2018-09-05 RX ADMIN — Medication SCH UNIT: at 07:48

## 2018-09-05 RX ADMIN — FUROSEMIDE SCH MG: 10 INJECTION, SOLUTION INTRAMUSCULAR; INTRAVENOUS at 07:47

## 2018-09-05 RX ADMIN — FUROSEMIDE SCH MG: 10 INJECTION, SOLUTION INTRAMUSCULAR; INTRAVENOUS at 15:37

## 2018-09-05 RX ADMIN — Medication SCH MG: at 21:26

## 2018-09-05 RX ADMIN — DIGOXIN SCH MCG: 0.25 INJECTION INTRAMUSCULAR; INTRAVENOUS at 11:32

## 2018-09-05 RX ADMIN — FUROSEMIDE SCH MG: 10 INJECTION, SOLUTION INTRAMUSCULAR; INTRAVENOUS at 06:38

## 2018-09-05 RX ADMIN — LISINOPRIL SCH MG: 10 TABLET ORAL at 21:26

## 2018-09-05 RX ADMIN — DIGOXIN SCH MCG: 0.25 INJECTION INTRAMUSCULAR; INTRAVENOUS at 17:21

## 2018-09-05 RX ADMIN — NYSTATIN SCH APPLIC: 100000 POWDER TOPICAL at 15:36

## 2018-09-05 RX ADMIN — DILTIAZEM HYDROCHLORIDE SCH MLS/HR: 5 INJECTION INTRAVENOUS at 11:32

## 2018-09-05 RX ADMIN — METOPROLOL TARTRATE SCH MG: 50 TABLET, FILM COATED ORAL at 15:37

## 2018-09-05 RX ADMIN — METOPROLOL TARTRATE SCH MG: 50 TABLET, FILM COATED ORAL at 21:26

## 2018-09-05 RX ADMIN — CEFAZOLIN SCH MLS/HR: 330 INJECTION, POWDER, FOR SOLUTION INTRAMUSCULAR; INTRAVENOUS at 17:20

## 2018-09-05 RX ADMIN — DILTIAZEM HYDROCHLORIDE SCH MLS/HR: 5 INJECTION INTRAVENOUS at 04:11

## 2018-09-05 RX ADMIN — FUROSEMIDE SCH MG: 10 INJECTION, SOLUTION INTRAMUSCULAR; INTRAVENOUS at 21:27

## 2018-09-05 RX ADMIN — Medication SCH EACH: at 07:48

## 2018-09-05 NOTE — P.CONS
History of Present Illness





- Reason for Consult


Consult date: 18


Metastatic Ovarian Cancer


Requesting physician: Keshawn Keen





- Chief Complaint


Palpitations and SOB





- History of Present Illness





HPI :





This is a very nice lady who presented with abdominal pain and distension for 

about a month duration.


U/S done on 10/14/2016 revealed evidence of ascites.


Diagnostic paracentesis done on 10/28/2016,cytology revealed metastatic 

carcinoma consistent with non mucinous ovarian primary.


On 2016, was 499.


On 2016,CT scan of chest/abdomen/pelvis revealed ascites,omental cacking 

and small bilateral pleural effusion.


BRCA testing were negative.


She underwent optimal debulking surgery on 2016,R1 resection,her surgery 

was complicated by PE.Pathology revealed high grade serous adenocarcinoma,

positive peritoneal washing and peritoneal mestastasis over 2 cm beyond pelvis 

and positive mesenteric nodes.


She started carboplatin/taxol on 2017.


She completed 6 cycles of adjuvant carboplatin/taxol on 2017


On 2017, was 13.0.


On 2017,repeat CT scan of chest/abdomen/plevis revealed mesenteric 

nodules in the region of the gastrohepatic ligament and perihepatic regions 

which could represent adenopathy, omental metastasis,they are not well 

appreciated on the prior examination as there was abdominal ascites that has is 

now resolved.


On 2018,repeat CT scan of chest/abdomen/pelvis revealed relatively stable 

finding compared to prior CT.


On 2018, was 84.5.


On 2018,repeat CT scan of chest/abdomen/pelvis revealed evidence of 

disease progression.


On 2018, was 146.7


She started gemzar/avastin on 2018


On 2018, was up to 205 and carbo/avastin were discontinued


On 2018,repeat CT scan of chest/abdomen/pelvis revealed new small right 

pleural effusion,otherwise stable.Gemzar/avastin were discontinued.


On 2018, was 253


On 2018,she started Carboplatin/Doxil, prior to starting Echocardiogram 

was completed


On 2018, was 313.1, Recent follow-up with Dr. Hillman shows Ca125 279





She feels tolerating treatment okay,however,she has had significant exertional 

dyspnea and palpitation  over the last week, this had led her to seek further 

assessment at [facility] on 18. She has a known history of A-fib and 

is on Eliquis 5mg po bid for this. Patient has been having more body aches.  

Felt increased palpitations over the past several days.  Heart rate has been 

between 101-120.  No chest pain.  Patient is starting to feel a little short of 

breath over the past several days.  Patient does have some minimal leg 

swelling.  No calf tenderness.  No fevers.  No cough.  She is now admitted on a 

cardizem drip and Cardiology is following. Her next cycle of chemotherapy is 

scheduled on 18. 


She is status Post Cycle 2 of Doxil and Carboplatin, Neutrophils 18 - 1, 

WBC 2.2. She did not receive Neulasta








Review of Systems





A 14 point review of systems assessed and completed and all negative except HPI





Past Medical History


Past Medical History: Atrial Fibrillation, Cancer, Hyperlipidemia, Hypertension

, Pulmonary Embolus (PE)


Additional Past Medical History / Comment(s): Ovarian Cancer


History of Any Multi-Drug Resistant Organisms: None Reported


Past Surgical History: Appendectomy, Cholecystectomy, Hysterectomy, 

Tonsillectomy, Tubal Ligation


Additional Past Surgical History / Comment(s): colonoscopies, 2x cervical 

polypectomy, couple of D&Cs, debulking,


Past Anesthesia/Blood Transfusion Reactions: No Reported Reaction


Past Psychological History: No Psychological Hx Reported


Additional Psychological History / Comment(s): Pt resides with her spouse.  She 

is independent.


Smoking Status: Never smoker


Past Alcohol Use History: Rare


Past Drug Use History: None Reported





- Past Family History


  ** Mother


Family Medical History: AFIB, Chest Pain / Angina, Diabetes Mellitus, 

Hypertension


Additional Family Medical History / Comment(s): Mother is 90 yrs old.





  ** Father


Family Medical History: Cancer


Additional Family Medical History / Comment(s): Father  of lung cancer at 

the age of 78yrs.





Medications and Allergies


 Home Medications











 Medication  Instructions  Recorded  Confirmed  Type


 


Calcium Carbonate [Calcium] 600 mg PO DAILY #0 16 History


 


Cholecalciferol [Vitamin D3] 2,000 unit PO DAILY 16 History


 


Apixaban [Eliquis] 5 mg PO BID #30 tab 08/10/16 09/04/18 Rx


 


Metoprolol Tartrate [Lopressor] 50 mg PO BID 10/20/16 09/04/18 History


 


Furosemide [Lasix] 20 mg PO DAILY PRN 18 History


 


Lisinopril [Zestril] 10 mg PO BID 18 History











 Allergies











Allergy/AdvReac Type Severity Reaction Status Date / Time


 


hydromorphone [From Dilaudid] AdvReac  Hallucinati Verified 18 18:42





   ons  














Physical Exam


Vitals: 


 Vital Signs











  Temp Pulse Pulse Resp BP BP Pulse Ox


 


 18 12:00    103 H  16   


 


 18 11:28    103 H  16   149/98  93 L


 


 18 07:44    90  16   


 


 18 07:33    90  16   137/93  92 L


 


 18 03:25  97.3 F L   108 H  18   131/92  95


 


 18 00:00  97.2 F L   99  18   151/84  96


 


 18 20:00  97.1 F L   121 H  18   138/96  93 L


 


 18 18:19  97.0 F L   132 H  18   136/87  92 L


 


 18 17:37  97.6 F  122 H   18  134/95   97


 


 18 16:54   112 H    118/77  


 


 18 16:44   114 H    124/81  


 


 18 16:34   112 H    105/76  


 


 18 16:24   118 H    150/72  


 


 18 16:14   100    124/81  


 


 18 16:04   120 H   16  134/73  


 


 18 15:58   133 H    106/73  


 


 18 15:34   137 H   16  149/107   98


 


 18 14:52    155 H    


 


 18 14:51     16   


 


 18 14:41  97.4 F L  137 H   16  150/101   95








 Intake and Output











 18





 22:59 06:59 14:59


 


Intake Total 22.167 40.917 290


 


Output Total  300 1000


 


Balance 22.167 -259.083 -710


 


Intake:   


 


  Intake, IV Titration 22.167 40.917 50





  Amount   


 


    Diltiazem 50 mg In Sodium 22.167 40.917 50





    Chloride 0.9% 40 ml @ 7.   





    5 MG/HR 7.5 mls/hr IV .   





    [id number] [facility] Rx#:[id number]   


 


  Oral   240


 


Output:   


 


  Urine  300 1000


 


Other:   


 


  Voiding Method Toilet Toilet 


 


  # Voids  2 1


 


  Weight 95.9 kg 96.2 kg 














General appearance: alert, in no apparent distress


Head exam: NC/NT


Eye exam: normal appearance, PERRL


ENT exam: normal oropharynx, Oral thrush mild base of posterior tongue


Neck exam: Supple, Trachea Midline 


Respiratory exam: No increased respiratory effort normal lung sounds bilaterally


Cardiovascular Exam:  tachycardia, irregular rhythm


GI/Abdominal exam:  soft.  Absent: tenderness


Extremities exam: pedal edema (+1 bilaterally).  Absent: calf tenderness


Neurological exam: non-focal alert


Psychiatric exam: normal affect, normal mood


Skin exam: yeast like rash under skin folds abdomen and breast, normal color





Results


CBC & Chem 7: 


 18 15:17





 18 15:17


Labs: 


 Abnormal Lab Results - Last 24 Hours (Table)











  18 Range/Units





  15:17 15:17 15:17 


 


WBC  2.2 L    (3.8-10.6)  k/uL


 


RDW  18.0 H    (11.5-15.5)  %


 


Plt Count  74 L    (150-450)  k/uL


 


Neutrophils #  1.0 L    (1.3-7.7)  k/uL


 


Lymphocytes #  0.9 L    (1.0-4.8)  k/uL


 


INR    1.2 H  (<1.2)  


 


BUN   23 H   (7-17)  mg/dL


 


Glucose   112 H   (74-99)  mg/dL


 


Magnesium   1.2 L   (1.6-2.3)  mg/dL














Assessment and Plan


Plan: 





Assessment and Recommendations 


1. Metastatic Ovarian Cancer:


 - Status Post Cycle 2 of Doxil and Carboplatin, did not receive neulasta


 - Cycle 3 is scheduled on 18





2. Atrial Fibrillation with RVR:


 - On Eliquis 5mg po BID


 - Cardiology is Following, will defer need to repeat echocardiogram given 

patient is on antracycline to Cardiology


 - Cardizem Drip per Cardiology





3. Skin rash - FUngal Appearance under abdominal skin fold and bilateral 

Breasts - Nystatin Powder ordered





4. Thrombocytopenia - No intervention needed at this time


 - Monitor CBC


 - Secondary to recent chemotherapy





5. Leukopenia - Secondary to chemotherapy


 - Should be in the recovery phase after chemotherapy, continue to monitor. 


 - If Neutrophils drop below 1, will start zarxio





Oncology Plan - FOllow-up in Office for CBC and NP VIsit on Monday (if 

discharged by then) for cleance for chemotherapy on Tuesday.





Physician Attestation: I have completed the full history and physical of this 

patient and agree with above dictation by Gabriela Dominguez NP.

## 2018-09-05 NOTE — P.CRDCN
History of Present Illness


Consult date: 18


Requesting physician: Jose Edwards


Reason for Consult (text): 


Afib, pleural effusion





Chief complaint: elevated HR, shortness of breath


History of present illness: 


This is a pleasant 72-year-old female patient who follows with Dr. Preston in the 

office.  She has a history of paroxysmal atrial fibrillation, on Eliquis for 

anticoagulation, hyperlipidemia, hypertension, and metastatic ovarian cancer.  

Was sent to the emergency department by Dr. Desai after noting the patient was 

tachycardic.  She does complain of occasional palpitations as well as some 

worsening shortness of breath over the last 2 weeks.  Upon presentation, 

patient was found to be in atrial fibrillation with rapid ventricular response.

  She was initiated on a Cardizem drip and her home metoprolol dose of 50 mg by 

mouth twice a day was resumed.  Chest x-ray showed increasing pleural effusions

, marked on the right and mild to moderate on the left as well as associated 

bilateral passive atelectasis pattern.  She's been started on Lasix 40 mg IV 

push every 8 hours.  Laboratory values were reviewed and showed a BUN of 23, 

creatinine 0.9, magnesium 1.2 which was replaced and is now 1.8, NT proBNP of 

6070 and troponins negative 3.  TSH, T4 and T3 are all within normal limits.  

Upon examination, patient is sitting up at the side of the bed.  She is feeling 

fairly well.  Continues to complain of shortness of breath with activity.  He 

denies current complaints of palpitations, dizziness or lightheadedness.  She's 

had no chest discomfort.  She does have mild lower extremity edema that seems 

to be normal for her.  She did have a recent echocardiogram prior to starting 

current chemotherapy treatment which revealed mild global hypokinesis of the LV 

with mildly impaired LV systolic function, ejection fraction between 45-50%.








Past Medical History


Past Medical History: Atrial Fibrillation, Cancer, Hyperlipidemia, Hypertension

, Pulmonary Embolus (PE)


Additional Past Medical History / Comment(s): Ovarian Cancer


History of Any Multi-Drug Resistant Organisms: None Reported


Past Surgical History: Appendectomy, Cholecystectomy, Hysterectomy, 

Tonsillectomy, Tubal Ligation


Additional Past Surgical History / Comment(s): colonoscopies, 2x cervical 

polypectomy, couple of D&Cs, debulking,


Past Anesthesia/Blood Transfusion Reactions: No Reported Reaction


Past Psychological History: No Psychological Hx Reported


Additional Psychological History / Comment(s): Pt resides with her spouse.  She 

is independent.


Smoking Status: Never smoker


Past Alcohol Use History: Rare


Past Drug Use History: None Reported





- Past Family History


  ** Mother


Family Medical History: AFIB, Chest Pain / Angina, Diabetes Mellitus, 

Hypertension


Additional Family Medical History / Comment(s): Mother is 90 yrs old.





  ** Father


Family Medical History: Cancer


Additional Family Medical History / Comment(s): Father  of lung cancer at 

the age of 78yrs.





Medications and Allergies


 Home Medications











 Medication  Instructions  Recorded  Confirmed  Type


 


Calcium Carbonate [Calcium] 600 mg PO DAILY #0 16 History


 


Cholecalciferol [Vitamin D3] 2,000 unit PO DAILY 16 History


 


Apixaban [Eliquis] 5 mg PO BID #30 tab 08/10/16 09/04/18 Rx


 


Metoprolol Tartrate [Lopressor] 50 mg PO BID 10/20/16 09/04/18 History


 


Furosemide [Lasix] 20 mg PO DAILY PRN 18 History


 


Lisinopril [Zestril] 10 mg PO BID 18 History











 Allergies











Allergy/AdvReac Type Severity Reaction Status Date / Time


 


hydromorphone [From Dilaudid] AdvReac  Hallucinati Verified 18 18:42





   ons  














Physical Exam


Vitals: 


 Vital Signs











  Temp Pulse Pulse Resp BP BP Pulse Ox


 


 18 12:00    103 H  16   


 


 18 11:28    103 H  16   149/98  93 L


 


 18 07:44    90  16   


 


 18 07:33    90  16   137/93  92 L


 


 18 03:25  97.3 F L   108 H  18   131/92  95


 


 18 00:00  97.2 F L   99  18   151/84  96


 


 18 20:00  97.1 F L   121 H  18   138/96  93 L


 


 18 18:19  97.0 F L   132 H  18   136/87  92 L


 


 18 17:37  97.6 F  122 H   18  134/95   97


 


 18 16:54   112 H    118/77  


 


 18 16:44   114 H    124/81  


 


 18 16:34   112 H    105/76  


 


 18 16:24   118 H    150/72  


 


 18 16:14   100    124/81  


 


 18 16:04   120 H   16  134/73  


 


 18 15:58   133 H    106/73  


 


 18 15:34   137 H   16  149/107   98


 


 18 14:52    155 H    


 


 18 14:51     16   








 Intake and Output











 18





 22:59 06:59 14:59


 


Intake Total 22.167 40.917 290


 


Output Total  300 1500


 


Balance 22.167 -259.083 -1210


 


Intake:   


 


  Intake, IV Titration 22.167 40.917 50





  Amount   


 


    Diltiazem 50 mg In Sodium 22.167 40.917 50





    Chloride 0.9% 40 ml @ 7.   





    5 MG/HR 7.5 mls/hr IV .   





    Q6H40M [facility] Rx#:[id number]   


 


  Oral   240


 


Output:   


 


  Urine  300 1500


 


Other:   


 


  Voiding Method Toilet Toilet 


 


  # Voids  2 1


 


  Weight 95.9 kg 96.2 kg 











PHYSICAL EXAMINATION: 





HEENT: [Head is atraumatic, normocephalic.  Pupils equal, round.  Neck is 

supple.  There is no elevated jugular venous pressure.]





HEART EXAMINATION: [Heart sounds irregularly irregular, S1 and S2 normal.  No 

murmur or gallop heard.]





CHEST EXAMINATION:[ Lungs reveal diminished air entry to bilateral lower lobes. 

No chest wall tenderness is noted on palpation or with deep breathing.]





ABDOMEN: [ Soft, nontender. Bowel sounds are heard. No organomegaly noted].


 


EXTREMITIES:[ 2+ peripheral pulses with no evidence of peripheral edema and no 

calf tenderness noted].





NEUROLOGIC [patient is awake, alert and oriented x3.]


 


.


 











Results





 18 15:17





 18 15:17


 Cardiac Enzymes











  18 Range/Units





  15:17 15:17 21:10 


 


AST   32   (14-36)  U/L


 


CK-MB (CK-2)  0.5   0.5  (0.0-2.4)  ng/mL


 


Troponin I  0.012   0.014  (0.000-0.034)  ng/mL














  18 Range/Units





  03:21 


 


AST   (14-36)  U/L


 


CK-MB (CK-2)  0.6  (0.0-2.4)  ng/mL


 


Troponin I  <0.012  (0.000-0.034)  ng/mL








 Coagulation











  18 Range/Units





  15:17 


 


PT  11.7  (9.0-12.0)  sec


 


APTT  22.0  (22.0-30.0)  sec








 CBC











  18 Range/Units





  15:17 


 


WBC  2.2 L  (3.8-10.6)  k/uL


 


RBC  4.09  (3.80-5.40)  m/uL


 


Hgb  12.7  (11.4-16.0)  gm/dL


 


Hct  40.0  (34.0-46.0)  %


 


Plt Count  74 L  (150-450)  k/uL








 Comprehensive Metabolic Panel











  18 Range/Units





  15:17 


 


Sodium  142  (137-145)  mmol/L


 


Potassium  4.7  (3.5-5.1)  mmol/L


 


Chloride  107  ()  mmol/L


 


Carbon Dioxide  24  (22-30)  mmol/L


 


BUN  23 H  (7-17)  mg/dL


 


Creatinine  0.90  (0.52-1.04)  mg/dL


 


Glucose  112 H  (74-99)  mg/dL


 


Calcium  9.0  (8.4-10.2)  mg/dL


 


AST  32  (14-36)  U/L


 


ALT  27  (9-52)  U/L


 


Alkaline Phosphatase  86  ()  U/L


 


Total Protein  6.6  (6.3-8.2)  g/dL


 


Albumin  3.8  (3.5-5.0)  g/dL








 Current Medications











Generic Name Dose Route Start Last Admin





  Trade Name Freq  PRN Reason Stop Dose Admin


 


Acetaminophen  650 mg  18 22:32  





  Tylenol Tab  PO   





  Q6HR PRN   





  Mild Pain or Fever > 100.5   





     





     





     


 


Calcium Carbonate  1 each  18 09:00  18 07:48





  Oscal 500+D  PO   1 each





  DAILY SEDRICK   Administration





     





     





     





     


 


Calcium Carbonate/Glycine  1,000 mg  18 22:32  





  Tums  PO   





  Q6HR PRN   





  Dyspepsia   





     





     





     


 


Cholecalciferol  2,000 unit  18 09:00  18 07:48





  Vitamin D3  PO   2,000 unit





  DAILY SEDRICK   Administration





     





     





     





     


 


Digoxin  250 mcg  18 12:00  18 11:32





  Lanoxin  IVP  18 18:01  250 mcg





  Q6HR SEDRICK   Administration





     





     





     





     


 


Digoxin  125 mcg  18 09:00  





  Lanoxin  PO   





  DAILY SEDRICK   





     





     





     





     


 


Furosemide  40 mg  18 06:00  18 07:47





  Lasix  IV   40 mg





  Q8HR SEDRICK   Administration





     





     





     





     


 


Diltiazem HCl 50 mg/ Sodium  50 mls @ 7.5 mls/hr  18 15:00  18 11:32





  Chloride  IV   7.5 mg/hr





  .Q6H40M SEDRICK   7.5 mls/hr





     Administration





     





     





  7.5 MG/HR   


 


Sodium Chloride  1,000 mls @ 20 mls/hr  18 16:45  18 17:36





  Saline 0.9%  IV   20 mls/hr





  .Q24H SEDRICK   Administration





     





     





     





     


 


Lisinopril  10 mg  18 21:15  18 07:48





  Zestril  PO   10 mg





  BID SEDRICK   Administration





     





     





     





     


 


Lorazepam  0.5 mg  18 22:32  





  Ativan  PO   





  Q6HR PRN   





  Anxiety   





     





     





     


 


Magnesium Oxide  400 mg  18 21:00  18 07:48





  Mag-Ox  PO   400 mg





  BID SEDRICK   Administration





     





     





     





     


 


Melatonin  3 mg  18 22:32  





  Melatonin  PO   





  HS PRN   





  Insomnia   





     





     





     


 


Metoprolol Tartrate  50 mg  18 16:00  





  Lopressor  PO   





  TID SEDRICK   





     





     





     





     


 


Naloxone HCl  0.2 mg  18 16:34  





  Narcan  IV   





  Q2M PRN   





  Opioid Reversal   





     





     





     


 


Nystatin  1 applic  18 13:45  





  Mycostatin Powder  TOPICAL   





  BID [facility]   





     





     





     





     


 


Ondansetron HCl  4 mg  18 22:32  





  Zofran  IVP   





  Q8HR PRN   





  Nausea And Vomiting   





     





     





     








 Intake and Output











 18





 22:59 06:59 14:59


 


Intake Total 22.167 40.917 290


 


Output Total  300 1500


 


Balance 22.167 -259.083 -1210


 


Intake:   


 


  Intake, IV Titration 22.167 40.917 50





  Amount   


 


    Diltiazem 50 mg In Sodium 22.167 40.917 50





    Chloride 0.9% 40 ml @ 7.   





    5 MG/HR 7.5 mls/hr IV .   





    Q6H40M [facility] Rx#:[id number]   


 


  Oral   240


 


Output:   


 


  Urine  300 1500


 


Other:   


 


  Voiding Method Toilet Toilet 


 


  # Voids  2 1


 


  Weight 95.9 kg 96.2 kg 








 





 18 15:17 





 18 15:17 











EKG Interpretations (text)


Atrial fibrillation with rapid ventricular response








Assessment and Plan


Assessment: 


#1 paroxysmal atrial fibrillation with rapid ventricular response, currently 

anticoagulated on Eliquis


#2 mildly impaired LV systolic function with an ejection fraction between 45-50%


#3 metastatic ovarian cancer


#4 bilateral pleural effusions right greater than left


#5 hypertension


#6 hyperlipidemia





Plan: 


From cardiology perspective, we will increase metoprolol to 50 mg by mouth 3 

times a day, we will start the patient on digoxin and discontinue Cardizem 

drip.  We recommend consulting interventional radiology or pulmonary for 

possible thoracentesis.  We will hold anticoagulants for 48 hours in 

anticipation for possible thoracentesis to be done on Friday, .  We 

will continue to follow the patient provide further recommendations accordingly.





NP note has been reviewed, I agree with a documented findings and plan of care.

  Patient was seen and examined.

## 2018-09-06 LAB
ANION GAP SERPL CALC-SCNC: 9 MMOL/L
BUN SERPL-SCNC: 18 MG/DL (ref 7–17)
CALCIUM SPEC-MCNC: 8.7 MG/DL (ref 8.4–10.2)
CELL CNT PNL FLD: 100
CELLS COUNTED: 100
CHLORIDE SERPL-SCNC: 98 MMOL/L (ref 98–107)
CO2 SERPL-SCNC: 34 MMOL/L (ref 22–30)
DEPRECATED POLYS # FLD: 5 %
ERYTHROCYTE [DISTWIDTH] IN BLOOD BY AUTOMATED COUNT: 3.8 M/UL (ref 3.8–5.4)
ERYTHROCYTE [DISTWIDTH] IN BLOOD: 17.6 % (ref 11.5–15.5)
GLUCOSE SERPL-MCNC: 101 MG/DL (ref 74–99)
HCT VFR BLD AUTO: 37 % (ref 34–46)
HGB BLD-MCNC: 12 GM/DL (ref 11.4–16)
LYMPHOCYTES # BLD MANUAL: 0.68 K/UL (ref 1–4.8)
MAGNESIUM SPEC-SCNC: 1.2 MG/DL (ref 1.6–2.3)
MCH RBC QN AUTO: 31.5 PG (ref 25–35)
MCHC RBC AUTO-ENTMCNC: 32.3 G/DL (ref 31–37)
MCV RBC AUTO: 97.3 FL (ref 80–100)
MONOCYTES # BLD MANUAL: 0.23 K/UL (ref 0–1)
MONONUC CELLS # FLD: 95 %
NEUTROPHILS NFR BLD MANUAL: 48 %
NEUTS SEG # BLD MANUAL: 0.8 K/UL (ref 1.3–7.7)
NUC CELL # FLD: 810 /UL
PLATELET # BLD AUTO: 78 K/UL (ref 150–450)
POTASSIUM SERPL-SCNC: 3.4 MMOL/L (ref 3.5–5.1)
SODIUM SERPL-SCNC: 141 MMOL/L (ref 137–145)
WBC # BLD AUTO: 1.8 K/UL (ref 3.8–10.6)

## 2018-09-06 RX ADMIN — METOPROLOL TARTRATE SCH MG: 50 TABLET, FILM COATED ORAL at 16:10

## 2018-09-06 RX ADMIN — POTASSIUM CHLORIDE SCH MEQ: 20 TABLET, EXTENDED RELEASE ORAL at 10:02

## 2018-09-06 RX ADMIN — LISINOPRIL SCH MG: 10 TABLET ORAL at 20:06

## 2018-09-06 RX ADMIN — MAGNESIUM SULFATE IN DEXTROSE SCH MLS/HR: 10 INJECTION, SOLUTION INTRAVENOUS at 12:19

## 2018-09-06 RX ADMIN — Medication SCH: at 08:20

## 2018-09-06 RX ADMIN — FUROSEMIDE SCH MG: 10 INJECTION, SOLUTION INTRAMUSCULAR; INTRAVENOUS at 16:11

## 2018-09-06 RX ADMIN — VERAPAMIL HYDROCHLORIDE SCH MG: 40 TABLET, FILM COATED ORAL at 21:13

## 2018-09-06 RX ADMIN — POTASSIUM CHLORIDE SCH MEQ: 20 TABLET, EXTENDED RELEASE ORAL at 12:19

## 2018-09-06 RX ADMIN — LISINOPRIL SCH MG: 10 TABLET ORAL at 08:21

## 2018-09-06 RX ADMIN — DILTIAZEM HYDROCHLORIDE SCH MLS/HR: 5 INJECTION INTRAVENOUS at 07:06

## 2018-09-06 RX ADMIN — FUROSEMIDE SCH MG: 10 INJECTION, SOLUTION INTRAMUSCULAR; INTRAVENOUS at 23:21

## 2018-09-06 RX ADMIN — NYSTATIN SCH APPLIC: 100000 POWDER TOPICAL at 08:12

## 2018-09-06 RX ADMIN — NYSTATIN SCH: 100000 POWDER TOPICAL at 20:06

## 2018-09-06 RX ADMIN — POTASSIUM CHLORIDE SCH MEQ: 20 TABLET, EXTENDED RELEASE ORAL at 08:19

## 2018-09-06 RX ADMIN — APIXABAN SCH MG: 5 TABLET, FILM COATED ORAL at 20:06

## 2018-09-06 RX ADMIN — MAGNESIUM SULFATE IN DEXTROSE SCH MLS/HR: 10 INJECTION, SOLUTION INTRAVENOUS at 10:02

## 2018-09-06 RX ADMIN — Medication SCH MG: at 08:21

## 2018-09-06 RX ADMIN — DILTIAZEM HYDROCHLORIDE SCH MLS/HR: 5 INJECTION INTRAVENOUS at 00:35

## 2018-09-06 RX ADMIN — METOPROLOL TARTRATE SCH MG: 50 TABLET, FILM COATED ORAL at 08:22

## 2018-09-06 RX ADMIN — Medication SCH MG: at 20:06

## 2018-09-06 RX ADMIN — VERAPAMIL HYDROCHLORIDE SCH MG: 40 TABLET, FILM COATED ORAL at 16:10

## 2018-09-06 RX ADMIN — DIGOXIN SCH MCG: 125 TABLET ORAL at 08:21

## 2018-09-06 RX ADMIN — MAGNESIUM SULFATE IN DEXTROSE SCH MLS/HR: 10 INJECTION, SOLUTION INTRAVENOUS at 08:18

## 2018-09-06 RX ADMIN — CEFAZOLIN SCH: 330 INJECTION, POWDER, FOR SOLUTION INTRAMUSCULAR; INTRAVENOUS at 18:11

## 2018-09-06 RX ADMIN — METOPROLOL TARTRATE SCH MG: 50 TABLET, FILM COATED ORAL at 21:12

## 2018-09-06 RX ADMIN — FILGRASTIM-SNDZ SCH MCG: 480 INJECTION, SOLUTION INTRAVENOUS; SUBCUTANEOUS at 11:03

## 2018-09-06 RX ADMIN — FUROSEMIDE SCH MG: 10 INJECTION, SOLUTION INTRAMUSCULAR; INTRAVENOUS at 08:13

## 2018-09-06 RX ADMIN — Medication SCH UNIT: at 08:20

## 2018-09-06 NOTE — P.CNPUL
History of Present Illness


Consult date: 18


Reason for consult: dyspnea, pleural effusion


History of present illness: 





A pleasant 72-year-old female patient with anesthetic overrating cancer that 

was diagnosed initially in 2016 and the patient underwent optimal debulking 

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 

was also involving the mesenteric nodes.  The patient was initially given a 

combination of carboplatinum and Taxol and she completed 6 cycles of systemic 

chemotherapy to which she responded and the CA-125 level had dropped.  

Subsequently, her CA-125 level has been gradually on the rise.  Most recent 

level on 2018 was up to the ground and 13.  The patient was started on a 

combination of carboplatinum and Doxil.  CAT scan of the chest abdomen and 

pelvis that was done on 2018 showed a small right-sided pleural effusion 

development.  The patient came into the hospital having worsening shortness of 

breath and exertional dyspnea.  Note that she has completed the course of 2 

cycles of systemic chemotherapy with carboplatinum and Doxil.  She has no fever 

or chills.  She was slightly tachycardic.  She has history of atrial 

fibrillation and she is on long-term anticoagulation with Eliquis.  She had no 

chest pain.  Her white cell count was slightly depressed and the patient was 

given Neulasta on outpatient basis.  Chest x-ray revealed a moderate-sized right

-sided pleural effusion and for that reason a pulmonary consultation was 

requested.  The plan is to proceed with a diagnostic and therapeutic 

thoracentesis of the right sided pleural effusion.  Echo showed an ejection 

fraction of 45-50% with severely dilated LA and no other valvular abnormalities.





Review of Systems


Constitutional: Reports fatigue, Reports weakness


Eyes: denies blurred vision, denies bulging eye, denies decreased vision


Ears: deny: decreased hearing, ear discharge, earache, tinnitus


Ears, nose, mouth and throat: Denies headache, Denies sore throat


Cardiovascular: Reports dyspnea on exertion, Reports shortness of breath


Respiratory: Reports dyspnea


Gastrointestinal: Denies abdominal pain, Denies diarrhea, Denies nausea, Denies 

vomiting


Genitourinary: Denies dysuria, Denies hematuria


Menstruation: Reports as per HPI


Musculoskeletal: Reports as per HPI


Musculoskeletal: absent: ankle pain, ankle stiffness, ankle swelling


Integumentary: Denies pruritus, Denies rash


Neurological: Denies numbness, Denies weakness


Psychiatric: Reports as per HPI


Endocrine: Reports as per HPI, Reports fatigue


Hematologic/Lymphatic: Reports as per HPI


Allergic/Immunologic: Reports as per HPI





Past Medical History


Past Medical History: Atrial Fibrillation, Cancer, Hyperlipidemia, Hypertension

, Pulmonary Embolus (PE)


Additional Past Medical History / Comment(s): Ovarian Cancer


History of Any Multi-Drug Resistant Organisms: None Reported


Past Surgical History: Appendectomy, Cholecystectomy, Hysterectomy, 

Tonsillectomy, Tubal Ligation


Additional Past Surgical History / Comment(s): colonoscopies, 2x cervical 

polypectomy, couple of D&Cs, debulking,


Past Anesthesia/Blood Transfusion Reactions: No Reported Reaction


Past Psychological History: No Psychological Hx Reported


Additional Psychological History / Comment(s): Pt resides with her spouse.  She 

is independent.


Smoking Status: Never smoker


Past Alcohol Use History: Rare


Past Drug Use History: None Reported





- Past Family History


  ** Mother


Family Medical History: AFIB, Chest Pain / Angina, Diabetes Mellitus, 

Hypertension


Additional Family Medical History / Comment(s): Mother is 90 yrs old.





  ** Father


Family Medical History: Cancer


Additional Family Medical History / Comment(s): Father  of lung cancer at 

the age of 78yrs.





Medications and Allergies


 Home Medications











 Medication  Instructions  Recorded  Confirmed  Type


 


Calcium Carbonate [Calcium] 600 mg PO DAILY #0 16 History


 


Cholecalciferol [Vitamin D3] 2,000 unit PO DAILY 16 History


 


Apixaban [Eliquis] 5 mg PO BID #30 tab 08/10/16 09/04/18 Rx


 


Metoprolol Tartrate [Lopressor] 50 mg PO BID 10/20/16 09/04/18 History


 


Furosemide [Lasix] 20 mg PO DAILY PRN 18 History


 


Lisinopril [Zestril] 10 mg PO BID 18 History











 Allergies











Allergy/AdvReac Type Severity Reaction Status Date / Time


 


hydromorphone [From Dilaudid] AdvReac  Hallucinati Verified 18 18:42





   ons  














Physical Exam


Vitals: 


 Vital Signs











  Temp Pulse Pulse Resp BP BP Pulse Ox


 


 18 11:56    119 H  18   


 


 18 08:48    119 H  18   


 


 18 08:44  98.5 F   66  18  148/68   93 L


 


 09/06/18 04:00  97.1 F L  82  82  17   128/68  93 L


 


 18 23:15  97.0 F L  91  117 H  18   148/96  98


 


 18 21:00   91   18   


 


 18 20:00  97.1 F L   108 H  18   139/90  97


 


 18 15:54   106 H   14   


 


 18 15:26  97.6 F  106 H   14  144/98   91 L








 Intake and Output











 18





 22:59 06:59 14:59


 


Intake Total 300  288.875


 


Output Total 500 850 


 


Balance -200 -850 288.875


 


Intake:   


 


  IV 10  


 


    Invasive Line 1 10  


 


  Intake, IV Titration 50  48.875





  Amount   


 


    Diltiazem 50 mg In Sodium 50  48.875





    Chloride 0.9% 40 ml @ 7.   





    5 MG/HR 7.5 mls/hr IV .   





    Q6H40M [facility] Rx#:[id number]   


 


  Oral 240  240


 


Output:   


 


  Urine 500 850 


 


Other:   


 


  Voiding Method Toilet Toilet 


 


  Weight  93.3 kg 

















General appearance: alert, in no apparent distress


Head exam: NC/NT


Eye exam: normal appearance, PERRL


ENT exam: normal oropharynx, Oral thrush mild base of posterior tongue


Neck exam: Supple, Trachea Midline 


Respiratory exam: No increased respiratory effort normal lung sounds bilaterally

, there is diminished breath on the right lung base along with dullness to 

percussion consistent with a right-sided pleural effusion.


Cardiovascular Exam:  tachycardia, irregular rhythm


GI/Abdominal exam:  soft.  Absent: tenderness


Extremities exam: pedal edema (+1 bilaterally).  Absent: calf tenderness


Neurological exam: non-focal alert


Psychiatric exam: normal affect, normal mood


Skin exam: yeast like rash under skin folds abdomen and breast, normal color








Results





- Laboratory Findings


CBC and BMP: 


 18 06:12





 18 06:12


PT/INR, D-dimer











PT  11.7 sec (9.0-12.0)   18  15:17    


 


INR  1.2  (<1.2)  H  18  15:17    








Abnormal lab findings: 


 Abnormal Labs











  18





  15:17 15:17 15:17


 


WBC  2.2 L  


 


RDW  18.0 H  


 


Plt Count  74 L  


 


Neutrophils #  1.0 L  


 


Neutrophils # (Manual)   


 


Lymphocytes #  0.9 L  


 


Lymphocytes # (Manual)   


 


INR    1.2 H


 


Potassium   


 


Carbon Dioxide   


 


BUN   23 H 


 


Glucose   112 H 


 


Magnesium   1.2 L 














  18





  06:12 06:12


 


WBC  1.8 L 


 


RDW  17.6 H 


 


Plt Count  78 L 


 


Neutrophils #  


 


Neutrophils # (Manual)  0.80 L 


 


Lymphocytes #  


 


Lymphocytes # (Manual)  0.68 L 


 


INR  


 


Potassium   3.4 L


 


Carbon Dioxide   34 H


 


BUN   18 H


 


Glucose   101 H


 


Magnesium   1.2 L














- Diagnostic Findings


Chest x-ray: image reviewed





Assessment and Plan


Plan: 











Assessment





1 right-sided pleural effusion, possibly malignant, will need a diagnostic and 

therapeutic thoracentesis





2 metastatic ovarian cancer post-systemic chemotherapy and the patient 

completed 2 cycles of Doxil and carboplatin and





3 neutropenia borderline





4 chronic atrial fibrillation with RVR at the time of admission and the rate is 

under better control and the patient is currently off Eliquis awaiting 

thoracentesis.  Echo of the heart was noted and the patient is an ejection 

fraction of 40-45%





5 thrombocytopenia/leukopenia, chemotherapy-induced





6 previous history of pulmonary embolism maintained on long-term and to 

coagulation with Eliquis





7 hypertension





8 hyperlipidemia





Plan





Keep the Eliquis on hold and this can be restarted after the diagnostic 

thoracentesis will be done today and the fluid will be sent for analysis.  

Anticipate malignant effusion.

## 2018-09-06 NOTE — XR
EXAMINATION TYPE: XR chest 1V

 

DATE OF EXAM: 9/6/2018

 

COMPARISON: 9/4/2018

 

HISTORY: 72-year-old female status post thoracentesis

 

TECHNIQUE: Single frontal view of the chest is obtained.

 

FINDINGS:  Interval decrease in size of the of the patient's right-sided pleural effusion. Small bila
teral pleural effusions remain. No appreciable pneumothorax. Some additional patchy right basilar den
sity. Heart mildly enlarged.

 

 

IMPRESSION:  

1. Residual small right pleural effusion with adjacent atelectasis and/or consolidation after thorace
ntesis. No appreciable pneumothorax.

2. Similar small left effusion.

## 2018-09-06 NOTE — P.PN
Subjective


Progress Note Date: 09/06/18


This is a pleasant 72-year-old female patient who follows with Dr. Preston in the 

office.  She has a history of paroxysmal atrial fibrillation, on Eliquis for 

anticoagulation, hyperlipidemia, hypertension, and metastatic ovarian cancer.  

Was sent to the emergency department by Dr. Desai after noting the patient was 

tachycardic.  She does complain of occasional palpitations as well as some 

worsening shortness of breath over the last 2 weeks.  Upon presentation, 

patient was found to be in atrial fibrillation with rapid ventricular response.

  She was initiated on a Cardizem drip and her home metoprolol dose of 50 mg by 

mouth twice a day was resumed.  Chest x-ray showed increasing pleural effusions

, marked on the right and mild to moderate on the left as well as associated 

bilateral passive atelectasis pattern.  She's been started on Lasix 40 mg IV 

push every 8 hours.  Laboratory values were reviewed and showed a BUN of 23, 

creatinine 0.9, magnesium 1.2 which was replaced and is now 1.8, NT proBNP of 

6070 and troponins negative 3.  TSH, T4 and T3 are all within normal limits.  

Upon examination, patient is sitting up at the side of the bed.  She is feeling 

fairly well.  Continues to complain of shortness of breath with activity.  She 

denies current complaints of palpitations, dizziness or lightheadedness.  She's 

had no chest discomfort.  She does have mild lower extremity edema that seems 

to be normal for her.  She did have a recent echocardiogram prior to starting 

current chemotherapy treatment which revealed mild global hypokinesis of the LV 

with mildly impaired LV systolic function, ejection fraction between 45-50%.  

Patient underwent right sided thoracentesis today by Dr. Mcmillan with 450 ML 

of turbid, dark yellowish fluid.  She continues beyond Cardizem drip and 

digoxin and her heart rate is better controlled today.








Objective





- Vital Signs


Vital signs: 


 Vital Signs











Temp  97.9 F   09/06/18 12:40


 


Pulse  75   09/06/18 12:40


 


Resp  18   09/06/18 12:40


 


BP  112/67   09/06/18 12:40


 


Pulse Ox  96   09/06/18 12:40








 Intake & Output











 09/05/18 09/06/18 09/06/18





 18:59 06:59 18:59


 


Intake Total 580 10 288.875


 


Output Total 1500 1350 500


 


Balance -920 -1340 -211.125


 


Weight  93.3 kg 


 


Intake:   


 


  IV  10 


 


    Invasive Line 1  10 


 


  Intake, IV Titration 100  48.875





  Amount   


 


    Diltiazem 50 mg In Sodium 100  48.875





    Chloride 0.9% 40 ml @ 7.   





    5 MG/HR 7.5 mls/hr IV .   





    Q6H40M [facility] Rx#:[id number]   


 


  Oral 480  240


 


Output:   


 


  Urine 1500 1350 500


 


Other:   


 


  Voiding Method  Toilet 


 


  # Voids 1  1














- Exam


PHYSICAL EXAMINATION: 





HEENT: [Head is atraumatic, normocephalic.  Pupils equal, round.  Neck is 

supple.  There is no elevated jugular venous pressure.]





HEART EXAMINATION: [Heart sounds irregularly irregular, S1 and S2 normal.  No 

murmur or gallop heard.]





CHEST EXAMINATION:[ Lungs reveal diminished air entry to bilateral lower lobes. 

No chest wall tenderness is noted on palpation or with deep breathing.]





ABDOMEN: [ Soft, nontender. Bowel sounds are heard. No organomegaly noted].


 


EXTREMITIES:[ 2+ peripheral pulses with no evidence of peripheral edema and no 

calf tenderness noted].





NEUROLOGIC [patient is awake, alert and oriented x3.]








- Labs


CBC & Chem 7: 


 09/06/18 06:12





 09/06/18 06:12


Labs: 


 Abnormal Lab Results - Last 24 Hours (Table)











  09/06/18 09/06/18 Range/Units





  06:12 06:12 


 


WBC  1.8 L   (3.8-10.6)  k/uL


 


RDW  17.6 H   (11.5-15.5)  %


 


Plt Count  78 L   (150-450)  k/uL


 


Neutrophils # (Manual)  0.80 L   (1.3-7.7)  k/uL


 


Lymphocytes # (Manual)  0.68 L   (1.0-4.8)  k/uL


 


Potassium   3.4 L  (3.5-5.1)  mmol/L


 


Carbon Dioxide   34 H  (22-30)  mmol/L


 


BUN   18 H  (7-17)  mg/dL


 


Glucose   101 H  (74-99)  mg/dL


 


Magnesium   1.2 L  (1.6-2.3)  mg/dL














Assessment and Plan


Assessment: 


#1 paroxysmal atrial fibrillation with rapid ventricular response, currently 

anticoagulated on Eliquis


#2 mildly impaired LV systolic function with an ejection fraction between 45-50%


#3 metastatic ovarian cancer


#4 bilateral pleural effusions right greater than left


#5 hypertension


#6 hyperlipidemia





Plan: 


From cardiology perspective, we will discontinue IV Cardizem drip.  We will 

resume anticoagulation.  We will add verapamil 40 mg by mouth 3 times a day.  

We will obtain a 2-D echo with Doppler tomorrow to reassess LV systolic 

function.  We will continue to follow the patient provide further 

recommendations accordingly.





NP note has been reviewed, I agree with a documented findings and plan of care.

  Patient was seen and examined.

## 2018-09-06 NOTE — PCN
PROCEDURE NOTE



THORACENTESES:

This was done without ultrasound markings.



INDICATION:

Right-sided pleural effusion. A time-out was completed verifying correct patient,

procedure, site, positioning , and implant (s) or special equipment if applicable.



PREOPERATIVE DIAGNOSIS:

Right side pleural effusion.



POSTOPERATIVE DIAGNOSIS:

Right side pleural effusion.



Ultrasound guidance was not used and appropriate fluid pocket was identified and

marked.  Patient was positioned, prepped and draped in usual sterile fashion.

Lidocaine was used to anesthetize the area.  A Thoracentesis catheter was introduced

into the right-sided pleural space and fluid was removed.  Blood loss was none.



A chest x-ray was ordered to evaluate the 450 mL.

Color of Fluid: Turbid, dark yellowish pleural effusion.



No bedside complication or bleeding.  Chest x-ray is to follow.





MMODL / IJN: [id number] / Job#: [id number]

## 2018-09-06 NOTE — PN
PROGRESS NOTE



DATE OF SERVICE:

09/06/2018



PRESENT COMPLAINT:

Short of breath.



INTERVAL HISTORY:

This patient has ovarian cancer on chemotherapy, presented with atrial flutter with

rapid ventricular rate.  Today, 450 mL of thoracentesis was carried out.  Cardizem drip

was discontinued and verapamil was added.  Breathing somewhat better.  Heart rate was

still running a bit on the higher side.  Did tolerate a meal.



REVIEW OF SYSTEMS:

Done for constitutional, cardiovascular, GI, pulmonary; relevant findings as above.



CURRENT MEDICATIONS:

Reviewed that include:

1. Digoxin.

2. Lasix 40 mg IV q.8.

3. Lopressor 50 mg t.i.d.

4. Verapamil 40 mg p.o. t.i.d. was added today.



EXAMINATION:

Temperature 96.9, pulse 84, respirations 18, blood pressure 130/64, pulse ox 93% on

room air.

GENERAL APPEARANCE:  Sitting up in a chair, comfortable.

EYES:  Pupils equal.  Conjunctivae normal.

HEENT:  External nose and ears normal.  Oral cavity normal.

NECK:  JVD unable to assess.  Mass not palpable.

RESPIRATORY:  Effort normal.

LUNGS:  Decreased breath sounds at bases.

CARDIOVASCULAR:  Heart sounds irregular.  Some edema is present.

ABDOMEN:  Distended, soft.  Liver and spleen not palpable.

PSYCHIATRY:  Alert and oriented x3. Mood and affect were normal.



INVESTIGATIONS:

White count 1.8, hemoglobin 12, platelets 78.  Potassium 3.4, CA-125 antigen 262.



ASSESSMENT:

1. Persistent atrial flutter/fibrillation with rapid ventricular rate, still somewhat

    uncontrolled this morning.

2. Acute congestive heart failure exacerbation, probably precipitated by atrial

    fibrillation, pending 2D echocardiogram.

3. Essential hypertension.

4. Hyperlipidemia.

5. Obesity; BMI 37.5.

6. Breast cancer, getting chemotherapy.

7. Pancytopenia secondary to chemotherapy.

8. Pleural effusion secondary to congestive heart failure, status post paracentesis

    450 mL.



PLAN:

Care was discussed with the patient.  Questions were answered.  Verapamil has been

added today.  Eliquis can be resumed tonight.  Cardizem drip was discontinued.





MMODL / IJN: [id number] / Job#: [id number]

## 2018-09-06 NOTE — P.PN
Subjective


Progress Note Date: 09/06/18


Principal diagnosis: 





Pleural Effusion, Neutropenia, Metastatic Ovarian cancer





Objective





- Vital Signs


Vital signs: 


 Vital Signs











Temp  98.5 F   09/06/18 08:44


 


Pulse  119 H  09/06/18 11:56


 


Resp  18   09/06/18 11:56


 


BP  148/68   09/06/18 08:44


 


Pulse Ox  93 L  09/06/18 08:44








 Intake & Output











 09/05/18 09/06/18 09/06/18





 18:59 06:59 18:59


 


Intake Total 580 10 288.875


 


Output Total 1500 1350 


 


Balance -920 -1340 288.875


 


Weight  93.3 kg 


 


Intake:   


 


  IV  10 


 


    Invasive Line 1  10 


 


  Intake, IV Titration 100  48.875





  Amount   


 


    Diltiazem 50 mg In Sodium 100  48.875





    Chloride 0.9% 40 ml @ 7.   





    5 MG/HR 7.5 mls/hr IV .   





    Q6H40M [facility] Rx#:[id number]   


 


  Oral 480  240


 


Output:   


 


  Urine 1500 1350 


 


Other:   


 


  Voiding Method  Toilet 


 


  # Voids 1  














- Exam





General appearance: alert, in no apparent distress


Head exam: NC/NT


Eye exam: normal appearance, PERRL


ENT exam: normal oropharynx, Oral thrush mild base of posterior tongue


Neck exam: Supple, Trachea Midline 


Respiratory exam: No increased respiratory effort normal lung sounds bilaterally

, diminished Right>Left


Cardiovascular Exam:  tachycardia, irregular rhythm


GI/Abdominal exam:  soft.  Absent: tenderness


Extremities exam: pedal edema (+1 bilaterally).  Absent: calf tenderness


Neurological exam: non-focal alert


Psychiatric exam: normal affect, normal mood


Skin exam: yeast like rash under skin folds abdomen and breast, normal color








- Labs


CBC & Chem 7: 


 09/06/18 06:12





 09/06/18 06:12


Labs: 


 Abnormal Lab Results - Last 24 Hours (Table)











  09/06/18 09/06/18 Range/Units





  06:12 06:12 


 


WBC  1.8 L   (3.8-10.6)  k/uL


 


RDW  17.6 H   (11.5-15.5)  %


 


Plt Count  78 L   (150-450)  k/uL


 


Neutrophils # (Manual)  0.80 L   (1.3-7.7)  k/uL


 


Lymphocytes # (Manual)  0.68 L   (1.0-4.8)  k/uL


 


Potassium   3.4 L  (3.5-5.1)  mmol/L


 


Carbon Dioxide   34 H  (22-30)  mmol/L


 


BUN   18 H  (7-17)  mg/dL


 


Glucose   101 H  (74-99)  mg/dL


 


Magnesium   1.2 L  (1.6-2.3)  mg/dL














Assessment and Plan


Plan: 





Assessment and Recommendations 


1. Metastatic Ovarian Cancer:


 - Status Post Cycle 2 of Doxil and Carboplatin, did not receive neulasta


 - Cycle 3 is scheduled on 9/11/18





2. Atrial Fibrillation with RVR:


 - On Eliquis 5mg po BID


 - Cardiology is Following, will defer need to repeat echocardiogram given 

patient is on antracycline to Cardiology


 - Cardizem Drip per Cardiology





3. Skin rash - FUngal Appearance under abdominal skin fold and bilateral 

Breasts - Nystatin Powder ordered





4. Thrombocytopenia - No intervention needed at this time


 - Monitor CBC


 - Secondary to recent chemotherapy





5. Leukopenia - Now with Neutropenia Secondary to chemotherapy


 - Should be in the recovery phase after chemotherapy, continue to monitor. 


 - ANC is less than 1, therefore Zarxio has been added


 - Monitor for Fevers, S/S infection - currently afebrile





6. Pleural Effusion Right Sided - Status Post Thoracentesis:


 - Pulm following


 - Await Cytology on fluid





Oncology Plan - FOllow-up in Office for CBC and NP VIsit on Monday (if 

discharged by then) for clearance for chemotherapy on Tuesday. Chemo will need 

to be delayed if blood counts do not recover

## 2018-09-07 VITALS — HEART RATE: 73 BPM | DIASTOLIC BLOOD PRESSURE: 77 MMHG | RESPIRATION RATE: 16 BRPM | SYSTOLIC BLOOD PRESSURE: 124 MMHG

## 2018-09-07 VITALS — TEMPERATURE: 96.8 F

## 2018-09-07 LAB
ANION GAP SERPL CALC-SCNC: 9 MMOL/L
BASOPHILS # BLD AUTO: 0 K/UL (ref 0–0.2)
BASOPHILS NFR BLD AUTO: 0 %
BUN SERPL-SCNC: 24 MG/DL (ref 7–17)
CALCIUM SPEC-MCNC: 8.9 MG/DL (ref 8.4–10.2)
CHLORIDE SERPL-SCNC: 97 MMOL/L (ref 98–107)
CHOLEST FLD-MCNC: 80 MG/DL
CO2 SERPL-SCNC: 34 MMOL/L (ref 22–30)
EOSINOPHIL # BLD AUTO: 0 K/UL (ref 0–0.7)
EOSINOPHIL NFR BLD AUTO: 0 %
ERYTHROCYTE [DISTWIDTH] IN BLOOD BY AUTOMATED COUNT: 3.8 M/UL (ref 3.8–5.4)
ERYTHROCYTE [DISTWIDTH] IN BLOOD: 17.1 % (ref 11.5–15.5)
GLUCOSE SERPL-MCNC: 106 MG/DL (ref 74–99)
HCT VFR BLD AUTO: 37.1 % (ref 34–46)
HGB BLD-MCNC: 12.1 GM/DL (ref 11.4–16)
LYMPHOCYTES # SPEC AUTO: 0.7 K/UL (ref 1–4.8)
LYMPHOCYTES NFR SPEC AUTO: 13 %
MAGNESIUM SPEC-SCNC: 1.7 MG/DL (ref 1.6–2.3)
MCH RBC QN AUTO: 31.8 PG (ref 25–35)
MCHC RBC AUTO-ENTMCNC: 32.6 G/DL (ref 31–37)
MCV RBC AUTO: 97.7 FL (ref 80–100)
MONOCYTES # BLD AUTO: 0.3 K/UL (ref 0–1)
MONOCYTES NFR BLD AUTO: 5 %
NEUTROPHILS # BLD AUTO: 4.6 K/UL (ref 1.3–7.7)
NEUTROPHILS NFR BLD AUTO: 80 %
PLATELET # BLD AUTO: 88 K/UL (ref 150–450)
POTASSIUM SERPL-SCNC: 3.5 MMOL/L (ref 3.5–5.1)
SODIUM SERPL-SCNC: 140 MMOL/L (ref 137–145)
WBC # BLD AUTO: 5.8 K/UL (ref 3.8–10.6)

## 2018-09-07 RX ADMIN — FUROSEMIDE SCH MG: 10 INJECTION, SOLUTION INTRAMUSCULAR; INTRAVENOUS at 08:18

## 2018-09-07 RX ADMIN — APIXABAN SCH MG: 5 TABLET, FILM COATED ORAL at 08:22

## 2018-09-07 RX ADMIN — Medication SCH EACH: at 08:22

## 2018-09-07 RX ADMIN — VERAPAMIL HYDROCHLORIDE SCH MG: 40 TABLET, FILM COATED ORAL at 16:11

## 2018-09-07 RX ADMIN — Medication SCH UNIT: at 08:22

## 2018-09-07 RX ADMIN — POTASSIUM CHLORIDE SCH MEQ: 20 TABLET, EXTENDED RELEASE ORAL at 10:08

## 2018-09-07 RX ADMIN — FILGRASTIM-SNDZ SCH MCG: 480 INJECTION, SOLUTION INTRAVENOUS; SUBCUTANEOUS at 09:08

## 2018-09-07 RX ADMIN — LISINOPRIL SCH MG: 10 TABLET ORAL at 11:27

## 2018-09-07 RX ADMIN — FUROSEMIDE SCH: 10 INJECTION, SOLUTION INTRAMUSCULAR; INTRAVENOUS at 16:32

## 2018-09-07 RX ADMIN — METOPROLOL TARTRATE SCH MG: 50 TABLET, FILM COATED ORAL at 16:11

## 2018-09-07 RX ADMIN — METOPROLOL TARTRATE SCH MG: 50 TABLET, FILM COATED ORAL at 08:23

## 2018-09-07 RX ADMIN — Medication SCH MG: at 08:23

## 2018-09-07 RX ADMIN — DIGOXIN SCH MCG: 125 TABLET ORAL at 08:22

## 2018-09-07 RX ADMIN — VERAPAMIL HYDROCHLORIDE SCH MG: 40 TABLET, FILM COATED ORAL at 08:23

## 2018-09-07 RX ADMIN — NYSTATIN SCH APPLIC: 100000 POWDER TOPICAL at 08:30

## 2018-09-07 RX ADMIN — POTASSIUM CHLORIDE SCH MEQ: 20 TABLET, EXTENDED RELEASE ORAL at 11:27

## 2018-09-07 NOTE — ECHOF
Referral Reason:reassess LVF



MEASUREMENTS

--------

HEIGHT: 162.6 cm

WEIGHT: 93.0 kg

BP: 124/66







FINDINGS

--------

Atrial fibrillation.

Limited Study reassess LVF: complete echo 7/10/18.

Overall left ventricular systolic function is low-normal with, an EF between 50 - 55 %.   Septal wall
 motion is delayed, and consistent with conduction delay/bundle branch block.

There is no pericardial effusion.



CONCLUSIONS

--------

1. Limited Study reassess LVF: complete echo 7/10/18.

2. Overall left ventricular systolic function is low-normal with, an EF between 50 - 55 %.

3. Septal wall motion is delayed, and consistent with conduction delay/bundle branch block.

4. There is no pericardial effusion.





SONOGRAPHER: Lisette Woods RDCS

## 2018-09-07 NOTE — P.PN
Subjective


Progress Note Date: 09/07/18











A pleasant 72-year-old female patient with anesthetic overrating cancer that 

was diagnosed initially in 2016 and the patient underwent optimal debulking 

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 

was also involving the mesenteric nodes.  The patient was initially given a 

combination of carboplatinum and Taxol and she completed 6 cycles of systemic 

chemotherapy to which she responded and the CA-125 level had dropped.  

Subsequently, her CA-125 level has been gradually on the rise.  Most recent 

level on 08/08/2018 was up to the ground and 13.  The patient was started on a 

combination of carboplatinum and Doxil.  CAT scan of the chest abdomen and 

pelvis that was done on 06/29/2018 showed a small right-sided pleural effusion 

development.  The patient came into the hospital having worsening shortness of 

breath and exertional dyspnea.  Note that she has completed the course of 2 

cycles of systemic chemotherapy with carboplatinum and Doxil.  She has no fever 

or chills.  She was slightly tachycardic.  She has history of atrial 

fibrillation and she is on long-term anticoagulation with Eliquis.  She had no 

chest pain.  Her white cell count was slightly depressed and the patient was 

given Neulasta on outpatient basis.  Chest x-ray revealed a moderate-sized right

-sided pleural effusion and for that reason a pulmonary consultation was 

requested.  The plan is to proceed with a diagnostic and therapeutic 

thoracentesis of the right sided pleural effusion.  Echo showed an ejection 

fraction of 45-50% with severely dilated LA and no other valvular abnormalities.





97 2018, the patient is less short of breath and the patient is feeling calm 

and comfortable and well for now.  No major shortness of breath.  No chest 

pain.  Thoracentesis was done yesterday.  The procedure was done without any 

complication total of 450 mL was aspirated from the patient's lungs.  Chest x-

ray shows some residual atelectatic change in the right lung base.  No evidence 

of any pneumothorax.  The patient is known to have metastatic ovarian cancer.  

Her age of abrasion under better control and the patient is currently on 

Eliquis for long-term anticoagulation.  She is doing well.  Discharge planning 

is in progress.  The patient is post cycle 2 of systemic chemotherapy and the 

patient is on a combination of Doxil and carboplatinum.  The patient can be 

discharged home from the pulmonary standpoint.  The fluid preliminary analysis 

is an exudate and the fluid cytology still pending for now.





Objective





- Vital Signs


Vital signs: 


 Vital Signs











Temp  96.8 F L  09/07/18 14:52


 


Pulse  73   09/07/18 14:52


 


Resp  16   09/07/18 14:52


 


BP  124/77   09/07/18 14:52


 


Pulse Ox  93 L  09/07/18 14:52








 Intake & Output











 09/06/18 09/07/18 09/07/18





 18:59 06:59 18:59


 


Intake Total 528.875 20 245


 


Output Total 500 1000 


 


Balance 28.875 -980 245


 


Weight  92.9 kg 


 


Intake:   


 


  IV  20 


 


    Invasive Line 1  20 


 


  Intake, IV Titration 48.875  





  Amount   


 


    Diltiazem 50 mg In Sodium 48.875  





    Chloride 0.9% 40 ml @ 7.   





    5 MG/HR 7.5 mls/hr IV .   





    Q6H40M [facility] Rx#:[id number]   


 


  Oral 480  245


 


Output:   


 


  Urine 500 1000 


 


Other:   


 


  Voiding Method  Toilet Toilet


 


  # Voids 2 1 2


 


  # Bowel Movements 0  0














- Exam








General appearance: alert, in no apparent distress


Head exam: NC/NT


Eye exam: normal appearance, PERRL


ENT exam: normal oropharynx, Oral thrush mild base of posterior tongue


Neck exam: Supple, Trachea Midline 


Respiratory exam: No increased respiratory effort normal lung sounds bilaterally

, there is diminished breath on the right lung base yet the air entry in the 

right lung is improved compared to yesterday and the patient has better 

aeration of the right lung base.


Cardiovascular Exam:  tachycardia, irregular rhythm


GI/Abdominal exam:  soft.  Absent: tenderness


Extremities exam: pedal edema (+1 bilaterally).  Absent: calf tenderness


Neurological exam: non-focal alert


Psychiatric exam: normal affect, normal mood


Skin exam: yeast like rash under skin folds abdomen and breast, normal color








- Labs


CBC & Chem 7: 


 09/07/18 06:04





 09/07/18 06:04


Labs: 


 Abnormal Lab Results - Last 24 Hours (Table)











  09/06/18 09/07/18 09/07/18 Range/Units





  06:12 06:04 06:04 


 


RDW   17.1 H   (11.5-15.5)  %


 


Plt Count   88 L   (150-450)  k/uL


 


Lymphocytes #   0.7 L   (1.0-4.8)  k/uL


 


Chloride    97 L  ()  mmol/L


 


Carbon Dioxide    34 H  (22-30)  mmol/L


 


BUN    24 H  (7-17)  mg/dL


 


Creatinine    1.13 H  (0.52-1.04)  mg/dL


 


Glucose    106 H  (74-99)  mg/dL


 


 Antigen  262.8 H    (0.0-30.1)  U/mL














Assessment and Plan


Plan: 











Assessment





1 right-sided pleural effusion, post therapeutic and diagnostic right-sided 

thoracentesis.  The pleural fluid is an exudate.  The cytology still pending 

for now.  Possibly this is a malignant pleural effusion.





2 metastatic ovarian cancer post-systemic chemotherapy and the patient 

completed 2 cycles of Doxil and carboplatin and





3 neutropenia borderline





4 chronic atrial fibrillation with RVR at the time of admission and the rate is 

under better control and the patient is currently off Eliquis awaiting 

thoracentesis.  Echo of the heart was noted and the patient is an ejection 

fraction of 40-45%





5 thrombocytopenia/leukopenia, chemotherapy-induced





6 previous history of pulmonary embolism maintained on long-term and to 

coagulation with Eliquis





7 hypertension





8 hyperlipidemia





Plan





The patient can be discharged from the pulmonary standpoint as the pulmonary 

status is improved and the patient is breathing easier and the atrial 

fibrillations is under better control.  Anticoagulation was restarted.  Follow 

up on outpatient basis.

## 2018-09-07 NOTE — PN
PROGRESS NOTE



Mrs. Potts is in atrial fib, rate control is much better.  She had an echocardiogram

ejection fraction is in the range of 50% or so.  She is clinically doing better.  She

had a thoracentesis performed yesterday on the right side.  We will continue current

medical regimen, increase activity and she can be discharged with rate control and

anticoagulation. S1, S2 heard normally. Regular rhythm noted. Heart rate is much better

controlled. Lungs reveal improved air entry, but overall still diminished breath

sounds. Abdomen is soft.  Lower extremities reveal diminished pulses.  Central nervous

system is normal.



Plan is to continue current medications.  Resume anticoagulation and she can be

discharged.





MMODL / IJN: [id number] / Job#: [id number]

## 2018-09-07 NOTE — P.PN
Subjective


Progress Note Date: 09/07/18


Principal diagnosis: 





Pleural Effusion, Neutropenia, Metastatic Ovarian cancer





Status Post Thoracentesis, She will be going home today. She is overall feeling 

better. 





Objective





- Vital Signs


Vital signs: 


 Vital Signs











Temp  96.8 F L  09/07/18 14:52


 


Pulse  73   09/07/18 14:52


 


Resp  16   09/07/18 16:00


 


BP  124/77   09/07/18 14:52


 


Pulse Ox  93 L  09/07/18 14:52








 Intake & Output











 09/06/18 09/07/18 09/07/18





 18:59 06:59 18:59


 


Intake Total 528.875 20 245


 


Output Total 500 1000 


 


Balance 28.875 -980 245


 


Weight  92.9 kg 


 


Intake:   


 


  IV  20 


 


    Invasive Line 1  20 


 


  Intake, IV Titration 48.875  





  Amount   


 


    Diltiazem 50 mg In Sodium 48.875  





    Chloride 0.9% 40 ml @ 7.   





    5 MG/HR 7.5 mls/hr IV .   





    Q6H40M [facility] Rx#:[id number]   


 


  Oral 480  245


 


Output:   


 


  Urine 500 1000 


 


Other:   


 


  Voiding Method  Toilet Toilet


 


  # Voids 2 1 2


 


  # Bowel Movements 0  0














- Exam





General appearance: alert, in no apparent distress


Head exam: NC/NT


Eye exam: normal appearance, PERRL


ENT exam: normal oropharynx, Oral thrush mild base of posterior tongue


Neck exam: Supple, Trachea Midline 


Respiratory exam: No increased respiratory effort normal lung sounds bilaterally

, diminished Right>Left


Cardiovascular Exam:  tachycardia, irregular rhythm


GI/Abdominal exam:  soft.  Absent: tenderness


Extremities exam: pedal edema (+1 bilaterally).  Absent: calf tenderness


Neurological exam: non-focal alert


Psychiatric exam: normal affect, normal mood


Skin exam: yeast like rash under skin folds abdomen and breast, normal color








- Labs


CBC & Chem 7: 


 09/07/18 06:04





 09/07/18 15:59


Labs: 


 Abnormal Lab Results - Last 24 Hours (Table)











  09/06/18 09/07/18 09/07/18 Range/Units





  06:12 06:04 06:04 


 


RDW   17.1 H   (11.5-15.5)  %


 


Plt Count   88 L   (150-450)  k/uL


 


Lymphocytes #   0.7 L   (1.0-4.8)  k/uL


 


Chloride    97 L  ()  mmol/L


 


Carbon Dioxide    34 H  (22-30)  mmol/L


 


BUN    24 H  (7-17)  mg/dL


 


Creatinine    1.13 H  (0.52-1.04)  mg/dL


 


Glucose    106 H  (74-99)  mg/dL


 


 Antigen  262.8 H    (0.0-30.1)  U/mL














Assessment and Plan


Plan: 





Assessment and Recommendations 


1. Metastatic Ovarian Cancer:


 - Status Post Cycle 2 of Doxil and Carboplatin, did not receive neulasta


 - Cycle 3 is scheduled on 9/11/18





2. Atrial Fibrillation with RVR: Resolved


 - On Eliquis 5mg po BID


 - Cardiology is Following, will defer need to repeat echocardiogram given 

patient is on antracycline to Cardiology


 - Cardizem Drip per Cardiology 





3. Skin rash - FUngal Appearance under abdominal skin fold and bilateral 

Breasts - Nystatin Powder ordered





4. Thrombocytopenia - No intervention needed at this time


 - Monitor CBC


 - Secondary to recent chemotherapy





5. Leukopenia - Now with Neutropenia Secondary to chemotherapy


 - Should be in the recovery phase after chemotherapy, continue to monitor. 


 - ANC is less than 1, therefore Zarxio has been added


 - Monitor for Fevers, S/S infection - currently afebrile





6. Pleural Effusion Right Sided - Status Post Thoracentesis:


 - Pulm following


 - Await Cytology on fluid





Oncology Plan - FOllow-up in Office for CBC and NP VIsit on Monday (if 

discharged by then) for clearance for chemotherapy on Tuesday. Chemo will need 

to be delayed if blood counts do not recover and will likely be held for one 

week or longer, deffer to Dr. Hillman after follow-up evaluation as outpatient

## 2018-09-10 NOTE — CDI
Last Revision, December 2017



Documentation Clarification Form



Date: 9/10/18

From: Jazmyne Andrea

Phone: [phone] Jessica Duckworth,  Hours-8:30 am & 5 pm MDIONNE

MRN: [id number]

Admit Date: 9/4/2018 4:34:00 PM

Patient Name: Bibiana Potts

Visit Number: [id number]

Discharge Date: 9/7/18



ATTENTION: The Clinical Documentation Specialists (CDI) and [facility] Coding Staff 
appreciate your assistance in clarifying documentation. Please respond to the 
clarification below the line at the bottom and electronically sign. The CDI & 
[facility] Coding staff will review the response and follow-up if needed. Please note: 
Queries are made part of the Legal Health Record. If you have any questions, 
please contact the author of this message via ITS.



Chacho Mcdaniels MD



Atrial Flutter is documented in the H&P, and 9/6 PN.  

History/Risk factors: atrial fib, pleural effusion, CHF, met peritoneal and 
mesenteric from ovarian primary

Clinical Indicators:

EKG/telemetry: atrial fibrillation w rapid ventricular response

Treatment:  IV Cardizem drip



In your professional opinion, in order to capture the severity of condition; 
can you please clarify the type of atrial flutter if known?  



Typical/Type I

Atypical/Type II

Other, please specify ________

Unable to determine



Please continue to document in your progress notes and discharge summary in 
order to capture severity of illness and risk of mortality. Include clinical 
findings that support your diagnosis.

___________________________________________________________________Unable to 
determine
MTDD

## 2018-09-10 NOTE — CDI
Last Revision, December 2017

Documentation Clarification Form



Date: 9/10/18

From: Jazmyne Mendez

Phone: 

MRN: [id number]

Admit Date: 9/4/2018 4:34:00 PM

Patient Name: Bibiana Potts

Visit Number: [id number]

Discharge Date: 9/7/18



ATTENTION: The Clinical Documentation Specialists (CDI) and [facility] Coding Staff 
appreciate your assistance in clarifying documentation. Please respond to the 
clarification below the line at the bottom and electronically sign. The CDI & 
[facility] Coding staff will review the response and follow-up if needed. Please note: 
Queries are made part of the Legal Health Record. If you have any questions, 
please contact the author of this message via ITS.



Chacho Mcdaniels MD



History/Risk Factors:pleural effusion, CHF, metastatic peritoneal and mesentric 
from ovarian primary

Clinical Indicators: acute CHF exacerbation

VS/Pulse OX: T-97.4, P-112, R-16,BP-105/76

BNP: 6070

Echocardiogram Results: left ventricular systolic functon is low-normal with an 
EF between 50-55%

Chest X Ray: Increasing pleural effusions

Treatment: right thoracentesis



In your professional opinion, can you please clarify the acuity and type of CHF 
if known?



Systolic Heart Failure

Diastolic Heart Failure

Systolic & Diastolic Heart Failure



Acute 

Chronic

Acute on Chronic Heart Failure

Unable to Determine

Other, please specify________________



Please continue to document in your progress notes and discharge summary in 
order to capture severity of illness and risk of mortality. Include clinical 
findings that support your diagnosis.

___________________________________________________________________Unable to 
Determine
MTDD

## 2018-11-12 ENCOUNTER — HOSPITAL ENCOUNTER (OUTPATIENT)
Dept: HOSPITAL 47 - RADCTMAIN | Age: 73
Discharge: HOME | End: 2018-11-12
Attending: INTERNAL MEDICINE
Payer: MEDICARE

## 2018-11-12 DIAGNOSIS — J90: Primary | ICD-10-CM

## 2018-11-12 DIAGNOSIS — C56.9: ICD-10-CM

## 2018-11-12 DIAGNOSIS — Z88.5: ICD-10-CM

## 2018-11-12 DIAGNOSIS — E27.8: ICD-10-CM

## 2018-11-12 DIAGNOSIS — R18.8: ICD-10-CM

## 2018-11-12 LAB — BUN SERPL-SCNC: 16 MG/DL (ref 7–17)

## 2018-11-12 PROCEDURE — 82565 ASSAY OF CREATININE: CPT

## 2018-11-12 PROCEDURE — 36415 COLL VENOUS BLD VENIPUNCTURE: CPT

## 2018-11-12 PROCEDURE — 74177 CT ABD & PELVIS W/CONTRAST: CPT

## 2018-11-12 PROCEDURE — 71260 CT THORAX DX C+: CPT

## 2018-11-12 PROCEDURE — 84520 ASSAY OF UREA NITROGEN: CPT

## 2018-11-12 NOTE — CT
EXAMINATION TYPE: CT ChestAbdPelvis w con

 

DATE OF EXAM: 11/12/2018

 

INDICATION: Ovarian Cancer-follow up

 

COMPARISON: 6/29/2018

 

CT DLP: 1227.6 mGycm

 

CONTRAST: 

Performed with Oral Contrast and with IV Contrast, patient injected with 80 mL of Isovue 300.

 

TECHNIQUE: Axial images at 5 mm thick sections.  Reconstructed images in the coronal plane.  Delayed 
images through the kidneys.  

 

FINDINGS:

 

CT CHEST:

 

Portion of the thyroid visualized is normal.

 

There is a small right pleural effusion. Minimal pneumonitis changes within the lingula and right mid
dle lobes.

 

No enlarged mediastinal or hilar adenopathy is evident. 

 

The ascending aorta diameter at the level of the main pulmonary artery is cm.  The main pulmonary art
jay diameter at the bifurcation is cm.

 

CT ABDOMEN: Some minimal ascites is adjacent to the liver. Minimal ascites may be adjacent to the lat
eral spleen.  Paracolic gutters appear clear. No free fluid is within the pelvis. Subcutaneous increa
sed densities along the posterior lower back. This could be ecchymosis.

 

Liver: There is a 0.7 cm hypodensity near the superior dome right lobe liver. This was present previo
usly.

 

Spleen: Normal

 

Pancreas: Slightly atrophic

 

Adrenal glands: Right adrenal gland is enlarged and 2.0 cm. This is slightly greater than 1.8 cm prev
ious. Left adrenal gland is very minimal thickening of the internal limb which is stable from compari
son.

 

Gallbladder: Normal  

 

Kidneys: No masses are evident. No hydronephrosis is present.   No cysts are present.  Delayed images
 were obtained through the kidneys, which remain unremarkable.

 

Aorta: Vascular calcification is within the aorta. 

 

Inferior vena cava: Normal.

 

CT PELVIS: 

Loops of bowel within the abdomen and pelvis are normal.     There are loops of bowel which are incom
pletely distended or lack oral contrast limiting their evaluation. Scattered diverticuli without acut
e diverticulitis are present. There is postsurgical change in the distal sigmoid colon. Fecal debris 
is at the rectum.

 

Appendix: Not identified

 

Urinary bladder: Decompressed, cannot be evaluated.

 

Genitourinary structures: Uterus is absent. Adnexal regions are clear.

 

Osseous structures: No suspicious lytic or sclerotic lesions. 

 

No suspicious omental caking is evident. No cystlike areas are within the pelvis.

 

IMPRESSIONS:

1. Small stable right pleural effusion.

2. Minimal ascites adjacent to the liver.

3. Stable hypodensity within the right lobe liver.

4. Stable enlarged right adrenal gland

## 2019-01-04 ENCOUNTER — HOSPITAL ENCOUNTER (INPATIENT)
Dept: HOSPITAL 47 - EC | Age: 74
LOS: 5 days | Discharge: HOME | DRG: 808 | End: 2019-01-09
Attending: HOSPITALIST | Admitting: HOSPITALIST
Payer: MEDICARE

## 2019-01-04 VITALS — BODY MASS INDEX: 34 KG/M2

## 2019-01-04 DIAGNOSIS — Z79.01: ICD-10-CM

## 2019-01-04 DIAGNOSIS — Z86.010: ICD-10-CM

## 2019-01-04 DIAGNOSIS — E83.42: ICD-10-CM

## 2019-01-04 DIAGNOSIS — Z80.3: ICD-10-CM

## 2019-01-04 DIAGNOSIS — Z80.1: ICD-10-CM

## 2019-01-04 DIAGNOSIS — R18.8: ICD-10-CM

## 2019-01-04 DIAGNOSIS — C56.9: ICD-10-CM

## 2019-01-04 DIAGNOSIS — I95.9: ICD-10-CM

## 2019-01-04 DIAGNOSIS — Z86.711: ICD-10-CM

## 2019-01-04 DIAGNOSIS — C78.6: ICD-10-CM

## 2019-01-04 DIAGNOSIS — Z88.5: ICD-10-CM

## 2019-01-04 DIAGNOSIS — Z82.49: ICD-10-CM

## 2019-01-04 DIAGNOSIS — E86.0: ICD-10-CM

## 2019-01-04 DIAGNOSIS — D61.810: Primary | ICD-10-CM

## 2019-01-04 DIAGNOSIS — K57.90: ICD-10-CM

## 2019-01-04 DIAGNOSIS — Z83.3: ICD-10-CM

## 2019-01-04 DIAGNOSIS — I48.0: ICD-10-CM

## 2019-01-04 DIAGNOSIS — T45.1X5A: ICD-10-CM

## 2019-01-04 DIAGNOSIS — I45.10: ICD-10-CM

## 2019-01-04 DIAGNOSIS — Z90.710: ICD-10-CM

## 2019-01-04 DIAGNOSIS — I50.32: ICD-10-CM

## 2019-01-04 DIAGNOSIS — E66.9: ICD-10-CM

## 2019-01-04 DIAGNOSIS — Z80.0: ICD-10-CM

## 2019-01-04 DIAGNOSIS — E78.5: ICD-10-CM

## 2019-01-04 DIAGNOSIS — I49.5: ICD-10-CM

## 2019-01-04 DIAGNOSIS — I48.92: ICD-10-CM

## 2019-01-04 DIAGNOSIS — N17.0: ICD-10-CM

## 2019-01-04 DIAGNOSIS — Z90.49: ICD-10-CM

## 2019-01-04 DIAGNOSIS — Z53.09: ICD-10-CM

## 2019-01-04 DIAGNOSIS — Z79.899: ICD-10-CM

## 2019-01-04 DIAGNOSIS — I11.0: ICD-10-CM

## 2019-01-04 DIAGNOSIS — C77.2: ICD-10-CM

## 2019-01-04 DIAGNOSIS — Z85.43: ICD-10-CM

## 2019-01-04 DIAGNOSIS — T75.3XXA: ICD-10-CM

## 2019-01-04 LAB
ALBUMIN SERPL-MCNC: 3.4 G/DL (ref 3.5–5)
ALP SERPL-CCNC: 78 U/L (ref 38–126)
ALT SERPL-CCNC: 22 U/L (ref 9–52)
ANION GAP SERPL CALC-SCNC: 6 MMOL/L
APTT BLD: 16.3 SEC (ref 22–30)
AST SERPL-CCNC: 28 U/L (ref 14–36)
BASOPHILS # BLD AUTO: 0 K/UL (ref 0–0.2)
BASOPHILS NFR BLD AUTO: 0 %
BUN SERPL-SCNC: 34 MG/DL (ref 7–17)
CALCIUM SPEC-MCNC: 8.6 MG/DL (ref 8.4–10.2)
CELLS COUNTED: 100
CELLS COUNTED: 100
CHLORIDE SERPL-SCNC: 103 MMOL/L (ref 98–107)
CK SERPL-CCNC: 32 U/L (ref 30–135)
CO2 SERPL-SCNC: 32 MMOL/L (ref 22–30)
EOSINOPHIL # BLD AUTO: 0 K/UL (ref 0–0.7)
EOSINOPHIL NFR BLD AUTO: 1 %
ERYTHROCYTE [DISTWIDTH] IN BLOOD BY AUTOMATED COUNT: 1.88 M/UL (ref 3.8–5.4)
ERYTHROCYTE [DISTWIDTH] IN BLOOD BY AUTOMATED COUNT: 2.29 M/UL (ref 3.8–5.4)
ERYTHROCYTE [DISTWIDTH] IN BLOOD BY AUTOMATED COUNT: 2.46 M/UL (ref 3.8–5.4)
ERYTHROCYTE [DISTWIDTH] IN BLOOD: 17.3 % (ref 11.5–15.5)
ERYTHROCYTE [DISTWIDTH] IN BLOOD: 17.5 % (ref 11.5–15.5)
ERYTHROCYTE [DISTWIDTH] IN BLOOD: 18.2 % (ref 11.5–15.5)
GLUCOSE SERPL-MCNC: 189 MG/DL (ref 74–99)
HCT VFR BLD AUTO: 19.4 % (ref 34–46)
HCT VFR BLD AUTO: 23.7 % (ref 34–46)
HCT VFR BLD AUTO: 24.9 % (ref 34–46)
HGB BLD-MCNC: 6.5 GM/DL (ref 11.4–16)
HGB BLD-MCNC: 7.9 GM/DL (ref 11.4–16)
HGB BLD-MCNC: 8.3 GM/DL (ref 11.4–16)
INR PPP: 1 (ref ?–1.2)
LYMPHOCYTES # BLD MANUAL: 0.51 K/UL (ref 1–4.8)
LYMPHOCYTES # BLD MANUAL: 1.15 K/UL (ref 1–4.8)
LYMPHOCYTES # SPEC AUTO: 0.6 K/UL (ref 1–4.8)
LYMPHOCYTES NFR SPEC AUTO: 49 %
MAGNESIUM SPEC-SCNC: 1.1 MG/DL (ref 1.6–2.3)
MCH RBC QN AUTO: 33.7 PG (ref 25–35)
MCH RBC QN AUTO: 34.3 PG (ref 25–35)
MCH RBC QN AUTO: 34.8 PG (ref 25–35)
MCHC RBC AUTO-ENTMCNC: 33.2 G/DL (ref 31–37)
MCHC RBC AUTO-ENTMCNC: 33.3 G/DL (ref 31–37)
MCHC RBC AUTO-ENTMCNC: 33.7 G/DL (ref 31–37)
MCV RBC AUTO: 101.2 FL (ref 80–100)
MCV RBC AUTO: 103.2 FL (ref 80–100)
MCV RBC AUTO: 103.3 FL (ref 80–100)
MONOCYTES # BLD AUTO: 0.1 K/UL (ref 0–1)
MONOCYTES # BLD MANUAL: 0.14 K/UL (ref 0–1)
MONOCYTES # BLD MANUAL: 0.17 K/UL (ref 0–1)
MONOCYTES NFR BLD AUTO: 5 %
NEUTROPHILS # BLD AUTO: 0.5 K/UL (ref 1.3–7.7)
NEUTROPHILS NFR BLD AUTO: 41 %
NEUTROPHILS NFR BLD MANUAL: 28 %
NEUTROPHILS NFR BLD MANUAL: 48 %
NEUTS SEG # BLD MANUAL: 0.5 K/UL (ref 1.3–7.7)
NEUTS SEG # BLD MANUAL: 0.62 K/UL (ref 1.3–7.7)
PLATELET # BLD AUTO: 15 K/UL (ref 150–450)
PLATELET # BLD AUTO: 17 K/UL (ref 150–450)
PLATELET # BLD AUTO: 20 K/UL (ref 150–450)
POTASSIUM SERPL-SCNC: 4.8 MMOL/L (ref 3.5–5.1)
PROT SERPL-MCNC: 6.1 G/DL (ref 6.3–8.2)
PT BLD: 11 SEC (ref 9–12)
SODIUM SERPL-SCNC: 141 MMOL/L (ref 137–145)
TROPONIN I SERPL-MCNC: <0.012 NG/ML (ref 0–0.03)
WBC # BLD AUTO: 1.3 K/UL (ref 3.8–10.6)
WBC # BLD AUTO: 1.3 K/UL (ref 3.8–10.6)
WBC # BLD AUTO: 1.8 K/UL (ref 3.8–10.6)

## 2019-01-04 PROCEDURE — 71046 X-RAY EXAM CHEST 2 VIEWS: CPT

## 2019-01-04 PROCEDURE — 84484 ASSAY OF TROPONIN QUANT: CPT

## 2019-01-04 PROCEDURE — 80048 BASIC METABOLIC PNL TOTAL CA: CPT

## 2019-01-04 PROCEDURE — 86900 BLOOD TYPING SEROLOGIC ABO: CPT

## 2019-01-04 PROCEDURE — 85610 PROTHROMBIN TIME: CPT

## 2019-01-04 PROCEDURE — 83735 ASSAY OF MAGNESIUM: CPT

## 2019-01-04 PROCEDURE — 82272 OCCULT BLD FECES 1-3 TESTS: CPT

## 2019-01-04 PROCEDURE — 85730 THROMBOPLASTIN TIME PARTIAL: CPT

## 2019-01-04 PROCEDURE — 93005 ELECTROCARDIOGRAM TRACING: CPT

## 2019-01-04 PROCEDURE — 80053 COMPREHEN METABOLIC PANEL: CPT

## 2019-01-04 PROCEDURE — 87040 BLOOD CULTURE FOR BACTERIA: CPT

## 2019-01-04 PROCEDURE — 87086 URINE CULTURE/COLONY COUNT: CPT

## 2019-01-04 PROCEDURE — 82553 CREATINE MB FRACTION: CPT

## 2019-01-04 PROCEDURE — 80162 ASSAY OF DIGOXIN TOTAL: CPT

## 2019-01-04 PROCEDURE — 93306 TTE W/DOPPLER COMPLETE: CPT

## 2019-01-04 PROCEDURE — 82550 ASSAY OF CK (CPK): CPT

## 2019-01-04 PROCEDURE — 86901 BLOOD TYPING SEROLOGIC RH(D): CPT

## 2019-01-04 PROCEDURE — 86920 COMPATIBILITY TEST SPIN: CPT

## 2019-01-04 PROCEDURE — 96360 HYDRATION IV INFUSION INIT: CPT

## 2019-01-04 PROCEDURE — 99291 CRITICAL CARE FIRST HOUR: CPT

## 2019-01-04 PROCEDURE — 86850 RBC ANTIBODY SCREEN: CPT

## 2019-01-04 PROCEDURE — 85025 COMPLETE CBC W/AUTO DIFF WBC: CPT

## 2019-01-04 PROCEDURE — 36415 COLL VENOUS BLD VENIPUNCTURE: CPT

## 2019-01-04 RX ADMIN — PANTOPRAZOLE SODIUM SCH MG: 40 INJECTION, POWDER, FOR SOLUTION INTRAVENOUS at 16:50

## 2019-01-04 RX ADMIN — MAGNESIUM SULFATE IN DEXTROSE SCH MLS/HR: 10 INJECTION, SOLUTION INTRAVENOUS at 16:10

## 2019-01-04 RX ADMIN — VERAPAMIL HYDROCHLORIDE SCH MG: 40 TABLET, FILM COATED ORAL at 21:18

## 2019-01-04 RX ADMIN — MAGNESIUM SULFATE IN DEXTROSE SCH MLS/HR: 10 INJECTION, SOLUTION INTRAVENOUS at 18:30

## 2019-01-04 RX ADMIN — CEFEPIME HYDROCHLORIDE SCH MLS/HR: 2 INJECTION, POWDER, FOR SOLUTION INTRAVENOUS at 21:18

## 2019-01-04 RX ADMIN — PANTOPRAZOLE SODIUM SCH MG: 40 INJECTION, POWDER, FOR SOLUTION INTRAVENOUS at 21:18

## 2019-01-04 RX ADMIN — MAGNESIUM SULFATE IN DEXTROSE SCH MLS/HR: 10 INJECTION, SOLUTION INTRAVENOUS at 17:17

## 2019-01-04 RX ADMIN — CEFEPIME HYDROCHLORIDE SCH MLS/HR: 2 INJECTION, POWDER, FOR SOLUTION INTRAVENOUS at 18:11

## 2019-01-04 NOTE — XR
EXAMINATION TYPE: XR chest 2V

 

DATE OF EXAM: 1/4/2019

 

COMPARISON: 9/6/2018

 

HISTORY: Shortness of breath

 

TECHNIQUE:  Frontal and lateral views of the chest are obtained.

 

FINDINGS:

 

Scattered senescent parenchymal changes noted. Hyperinflation compatible with COPD. 

 

Increased density right lung base. Chronic elevation right hemidiaphragm.

 

Heart size is stable.

 

Mediastinal structures are stable and grossly unremarkable.

 

No evidence for hilar prominence.

 

Degenerative changes dorsal spine. 

 

IMPRESSION:

1. Chronic changes right lung base.

## 2019-01-04 NOTE — HP
HISTORY AND PHYSICAL



DATE OF SERVICE:

01/04/2019



CHIEF COMPLAINTS:

Hypotension as well as anemia, weakness.



HISTORY OF PRESENT ILLNESS:

This 73-year-old woman with a past medical history of multiple medical problems,

including atrial fibrillation, history of atrial flutter, history of hypertension,

hyperlipidemia, history of pulmonary embolism, metastatic ovarian cancer, hysterectomy,

debulking, is undergoing chemotherapy with Dr. Hillman.  The patient is complaining of

progressive weakness, shortness of breath for the last several days. Patient was in Dr. Hillman's office and found to have blood pressure systolic in the 70s.  The patient was

also found to have anemia, with hemoglobin 6.5.  Patient was admitted for further

evaluation and treatment. There is no history of any fever, rigor or chills. No history

of headache, loss of consciousness, seizures. The patient has severe pancytopenia,

possibly secondary to chemotherapy.



PAST MEDICAL HISTORY:

1. History of atrial flutter/fibrillation.

2. History of hyperlipidemia.

3. Hypertension.

4. History of pulmonary embolus.

5. Metastatic ovarian cancer.

6. Appendectomy.

7. Cholecystectomy.



HOME MEDICATIONS:

1. Isoptin 40 mg p.o. b.i.d.

2. Lopressor 50 mg p.o. b.i.d.

3. Zestril 10 mg p.o. b.i.d.

4. Lanoxin 125 mcg q.48 hours.

5. Eliquis 5 mg p.o. b.i.d.



ALLERGIES:

DILAUDID.



FAMILY HISTORY:

History of atrial ablation, chest pain, diabetes and hypertension in the family.



SOCIAL HISTORY:

History of occasional alcohol intake.  No history of smoking.



REVIEW OF SYSTEMS:

ENT: No diminished hearing. No diminished vision.

CARDIOVASCULAR SYSTEM: As mentioned earlier.

RESPIRATORY SYSTEM: As mentioned earlier.

GI: No nausea, vomiting.

: No dysuria or retention.

NERVOUS SYSTEM: No numbness, weakness.

ALLERGY/IMMUNOLOGY: No asthma, hayfever.

MUSCULOSKELETAL: As mentioned earlier.

HEMATOLOGY/ONCOLOGY: As mentioned earlier.

ENDOCRINE: No history of diabetes, hypothyroidism.

CONSTITUTIONAL: As mentioned earlier.

DERMATOLOGY: Negative.

RHEUMATOLOGY: Negative.

PSYCHIATRY: As mentioned earlier.



PHYSICAL EXAMINATION:

Patient alert and oriented x3. Pulse 74, blood pressure 138/72, respirations 16,

temperature 98 degrees, pulse ox 100% on room air.

HEENT: Conjunctivae pale. Oral mucosa pale.

NECK: No jugular venous distention. No carotid bruit. No lymph node enlargement.

CARDIOVASCULAR SYSTEM: S1, S2 muffled. No S3. No S4. Ejection systolic murmur.

RESPIRATORY SYSTEM: Breath sounds diminished at the bases.  A few scattered rhonchi. No

crackles.

ABDOMEN: Soft, non-tender. No mass palpable.

LEGS: No edema. No swelling.

NERVOUS SYSTEM: Higher functions as mentioned earlier. Moves all 4 limbs. No focal

motor or sensory deficit.

LYMPHATICS: No lymph node palpable in neck, axillae or groin.

SKIN: No ulcer, rash, bleeding.



LABS:

WBC 1.3 and hemoglobin 6.2 and platelets 20.  INR is 1. Creatinine is 1.33 and

magnesium is 1.1.  Albumin is 3.4.



ASSESSMENT:

1. Atrial fibrillation with bradycardia.

2. Severe pancytopenia with severe symptomatic anemia with hemoglobin 6.5 secondary to

    chemotherapy.

3. Hypotension, possibly multifactorial or sepsis.

4. Metastatic ovarian cancer, on chemotherapy.

5. Elevated creatinine of 1.33 with acute renal failure, acute tubular necrosis,

    prerenal failure.

6. Hypomagnesemia.

7. Atrial flutter/fibrillation.

8. History of hypertension.

9. Hyperlipidemia.

10.History of pulmonary embolism.

11.History of appendectomy.

12.History of cholecystectomy.

13.History of motion sickness.



RECOMMENDATIONS AND DISCUSSION:

In this 73-year-old woman who presented with multiple complex medical issues, at this

time we will monitor the patient closely, continue the current management, continue

with symptomatic treatment. One unit of blood transfusion has been arranged. Also

recommend empiric antibiotics.  Follow the cultures.  Repeat labs.  Closely follow with

Hematology/Oncology.  We will resume the home medications once the blood pressure is

stabilized.  Otherwise, Cardiology and Dr. Hillman will be consulted.  The EKG done in

the ER showed atrial fibrillation with bradycardia. Will continue to monitor.  Further

recommendations to follow. Will hold the metoprolol and digoxin for now. See orders for

further details.





MMODL / IJN: [id number] / Job#: [id number]

## 2019-01-04 NOTE — ED
General Adult HPI





- General


Chief complaint: Recheck/Abnormal Lab/Rx


Stated complaint: SOB-Ca Pt


Time Seen by Provider: 19 09:00


Source: patient, RN notes reviewed


Mode of arrival: wheelchair


Limitations: no limitations





- History of Present Illness


Initial comments: 





This is a 73-year-old female presents emergency Department with a past medical 

history significant for ovarian cancer for which she is been treated for with 

chemotherapy patient's last chemotherapy was on 1218.  Patient comes in today 

because she was at Dr. Hillman's office today and the patient was hypotensive in 

the low 70s and anemic at 6.6 and the patient was feeling extremely weak and 

short of breath.  Patient states the shortness of breath and weakness have been 

ongoing for a couple of weeks been getting progressively worse.  Patient denies 

any black or bloody stools though she is on eliquis for A. fib.  Patient also 

states she is on verapamil and metoprolol for her A. fib.  Patient states she's 

had no chest pain but is short of breath and much worse with exertion.  Patient 

denies abdominal pain patient denies any nausea or vomiting.  Patient denies 

any syncopal episodes but she has felt lightheaded.





- Related Data


 Home Medications











 Medication  Instructions  Recorded  Confirmed


 


Metoprolol Tartrate [Lopressor] 50 mg PO BID 10/20/16 01/04/19


 


Lisinopril [Zestril] 10 mg PO BID 18


 


Digoxin [Lanoxin] 125 mcg PO Q48H 19


 


Verapamil [Isoptin] 40 mg PO BID 19








 Previous Rx's











 Medication  Instructions  Recorded


 


Apixaban [Eliquis] 5 mg PO BID #30 tab 08/10/16











 Allergies











Allergy/AdvReac Type Severity Reaction Status Date / Time


 


hydromorphone [From Dilaudid] AdvReac  Hallucinati Verified 19 09:18





   ons  














Review of Systems


ROS Statement: 


Those systems with pertinent positive or pertinent negative responses have been 

documented in the HPI.





ROS Other: All systems not noted in ROS Statement are negative.





Past Medical History


Past Medical History: Atrial Fibrillation, Cancer, Hyperlipidemia, Hypertension

, Pulmonary Embolus (PE)


Additional Past Medical History / Comment(s): Ovarian Cancer


History of Any Multi-Drug Resistant Organisms: None Reported


Past Surgical History: Appendectomy, Cholecystectomy, Hysterectomy, 

Tonsillectomy, Tubal Ligation


Additional Past Surgical History / Comment(s): colonoscopies, 2x cervical 

polypectomy, couple of D&Cs, debulking,


Past Anesthesia/Blood Transfusion Reactions: No Reported Reaction


Past Psychological History: No Psychological Hx Reported


Smoking Status: Never smoker


Past Alcohol Use History: Rare


Past Drug Use History: None Reported





- Past Family History


  ** Mother


Family Medical History: AFIB, Chest Pain / Angina, Diabetes Mellitus, 

Hypertension


Additional Family Medical History / Comment(s): Mother is 90 yrs old.





  ** Father


Family Medical History: Cancer


Additional Family Medical History / Comment(s): Father  of lung cancer at 

the age of 78yrs.





General Exam





- General Exam Comments


Initial Comments: 





GENERAL:


Patient is well-developed and well-nourished.  Patient is nontoxic and well-

hydrated and is in mild distress.





ENT:


Neck is soft and supple.  No significant lymphadenopathy is noted.  Oropharynx 

is clear.  Moist mucous membranes.  Neck has full range of motion without 

eliciting any pain.  





EYES:


The sclera were anicteric and conjunctiva were pink and moist.  Extraocular 

movements were intact and pupils were equal round and reactive to light.  

Eyelids were unremarkable.





PULMONARY:


Unlabored respirations.  Good breath sounds bilaterally.  No audible rales 

rhonchi or wheezing was noted.





CARDIOVASCULAR:


Patient is bradycardic at about 50 beats a minute





ABDOMEN:


Soft and nontender with normal bowel sounds.  No palpable organomegaly was 

noted.  There is no palpable pulsatile mass.





SKIN:


Patient's skin is pale





NEUROLOGIC:


Patient is alert and oriented x3.  Cranial nerves II through XII are grossly 

intact.  Motor and sensory are also intact.  Normal speech, volume and content.

  Symmetrical smile.  





MUSCULOSKELETAL:


Normal extremities with adequate strength and full range of motion.  No lower 

extremity swelling or edema.  No calf tenderness.





LYMPHATICS:


No significant lymphadenopathy is noted





PSYCHIATRIC:


Normal psychiatric evaluation.  


Limitations: no limitations





Course


 Vital Signs











  19





  08:48


 


Temperature 98.0 F


 


Pulse Rate 42 L


 


Respiratory 16





Rate 


 


Blood Pressure 74/46


 


O2 Sat by Pulse 98





Oximetry 














Medical Decision Making





- Medical Decision Making





EKG shows atrial fibrillation at a slow rate of 47 beats a minute QRS is 136 QT 

interval 468 QTC is 414.  Patient's EKG shows ST segment depression and T-wave 

inversions in leads V2 through V6.





I ordered 1 unit of packed red blood cells because hemoglobin on this patient 

was 6.5.





Patient is very bradycardic.





Patient also got a 1 L fluid bolus.





I spoke with the hospitalist and he agreed to admit the patient admitted the 

patient I consulted Dr. Hillman





- Lab Data


Result diagrams: 


 19 09:43





 19 09:43


 Lab Results











  19 Range/Units





  09:43 09:43 09:43 


 


WBC   1.3 L*   (3.8-10.6)  k/uL


 


RBC   1.88 L   (3.80-5.40)  m/uL


 


Hgb   6.5 L*   (11.4-16.0)  gm/dL


 


Hct   19.4 L*   (34.0-46.0)  %


 


MCV   103.3 H   (80.0-100.0)  fL


 


MCH   34.8   (25.0-35.0)  pg


 


MCHC   33.7   (31.0-37.0)  g/dL


 


RDW   17.3 H   (11.5-15.5)  %


 


Plt Count   20 L   (150-450)  k/uL


 


PT  11.0    (9.0-12.0)  sec


 


INR  1.0    (<1.2)  


 


APTT  16.3 L    (22.0-30.0)  sec


 


Sodium    141  (137-145)  mmol/L


 


Potassium    4.8  (3.5-5.1)  mmol/L


 


Chloride    103  ()  mmol/L


 


Carbon Dioxide    32 H  (22-30)  mmol/L


 


Anion Gap    6  mmol/L


 


BUN    34 H  (7-17)  mg/dL


 


Creatinine    1.33 H  (0.52-1.04)  mg/dL


 


Est GFR (CKD-EPI)AfAm    46  (>60 ml/min/1.73 sqM)  


 


Est GFR (CKD-EPI)NonAf    40  (>60 ml/min/1.73 sqM)  


 


Glucose    189 H  (74-99)  mg/dL


 


Calcium    8.6  (8.4-10.2)  mg/dL


 


Magnesium    1.1 L  (1.6-2.3)  mg/dL


 


Total Bilirubin    1.1  (0.2-1.3)  mg/dL


 


AST    28  (14-36)  U/L


 


ALT    22  (9-52)  U/L


 


Alkaline Phosphatase    78  ()  U/L


 


Total Protein    6.1 L  (6.3-8.2)  g/dL


 


Albumin    3.4 L  (3.5-5.0)  g/dL


 


Stool Occult Blood     (Negative)  














  19 Range/Units





  09:48 


 


WBC   (3.8-10.6)  k/uL


 


RBC   (3.80-5.40)  m/uL


 


Hgb   (11.4-16.0)  gm/dL


 


Hct   (34.0-46.0)  %


 


MCV   (80.0-100.0)  fL


 


MCH   (25.0-35.0)  pg


 


MCHC   (31.0-37.0)  g/dL


 


RDW   (11.5-15.5)  %


 


Plt Count   (150-450)  k/uL


 


PT   (9.0-12.0)  sec


 


INR   (<1.2)  


 


APTT   (22.0-30.0)  sec


 


Sodium   (137-145)  mmol/L


 


Potassium   (3.5-5.1)  mmol/L


 


Chloride   ()  mmol/L


 


Carbon Dioxide   (22-30)  mmol/L


 


Anion Gap   mmol/L


 


BUN   (7-17)  mg/dL


 


Creatinine   (0.52-1.04)  mg/dL


 


Est GFR (CKD-EPI)AfAm   (>60 ml/min/1.73 sqM)  


 


Est GFR (CKD-EPI)NonAf   (>60 ml/min/1.73 sqM)  


 


Glucose   (74-99)  mg/dL


 


Calcium   (8.4-10.2)  mg/dL


 


Magnesium   (1.6-2.3)  mg/dL


 


Total Bilirubin   (0.2-1.3)  mg/dL


 


AST   (14-36)  U/L


 


ALT   (9-52)  U/L


 


Alkaline Phosphatase   ()  U/L


 


Total Protein   (6.3-8.2)  g/dL


 


Albumin   (3.5-5.0)  g/dL


 


Stool Occult Blood  Negative  (Negative)  














Critical Care Time


Critical Care Time: Yes


Total Critical Care Time: 35





Disposition


Clinical Impression: 


 Anemia, Hypotension, Bradycardia





Disposition: ADMITTED AS IP TO THIS HOSP


Referrals: 


Hermelindo Garibay DO [Primary Care Provider] - 1-2 days


Time of Disposition: 10:34

## 2019-01-05 LAB
ANION GAP SERPL CALC-SCNC: 6 MMOL/L
BUN SERPL-SCNC: 24 MG/DL (ref 7–17)
CALCIUM SPEC-MCNC: 8.4 MG/DL (ref 8.4–10.2)
CELLS COUNTED: 100
CHLORIDE SERPL-SCNC: 107 MMOL/L (ref 98–107)
CO2 SERPL-SCNC: 28 MMOL/L (ref 22–30)
ERYTHROCYTE [DISTWIDTH] IN BLOOD BY AUTOMATED COUNT: 2.34 M/UL (ref 3.8–5.4)
ERYTHROCYTE [DISTWIDTH] IN BLOOD: 17.7 % (ref 11.5–15.5)
GLUCOSE SERPL-MCNC: 106 MG/DL (ref 74–99)
HCT VFR BLD AUTO: 24.2 % (ref 34–46)
HGB BLD-MCNC: 8 GM/DL (ref 11.4–16)
LYMPHOCYTES # BLD MANUAL: 0.72 K/UL (ref 1–4.8)
MAGNESIUM SPEC-SCNC: 1.9 MG/DL (ref 1.6–2.3)
MCH RBC QN AUTO: 34.3 PG (ref 25–35)
MCHC RBC AUTO-ENTMCNC: 33.2 G/DL (ref 31–37)
MCV RBC AUTO: 103.4 FL (ref 80–100)
MONOCYTES # BLD MANUAL: 0.07 K/UL (ref 0–1)
NEUTROPHILS NFR BLD MANUAL: 34 %
NEUTS SEG # BLD MANUAL: 0.41 K/UL (ref 1.3–7.7)
PLATELET # BLD AUTO: 18 K/UL (ref 150–450)
POTASSIUM SERPL-SCNC: 4.4 MMOL/L (ref 3.5–5.1)
SODIUM SERPL-SCNC: 141 MMOL/L (ref 137–145)
WBC # BLD AUTO: 1.2 K/UL (ref 3.8–10.6)

## 2019-01-05 RX ADMIN — PANTOPRAZOLE SODIUM SCH MG: 40 INJECTION, POWDER, FOR SOLUTION INTRAVENOUS at 08:44

## 2019-01-05 RX ADMIN — VERAPAMIL HYDROCHLORIDE SCH MG: 40 TABLET, FILM COATED ORAL at 08:44

## 2019-01-05 RX ADMIN — PANTOPRAZOLE SODIUM SCH MG: 40 INJECTION, POWDER, FOR SOLUTION INTRAVENOUS at 20:37

## 2019-01-05 RX ADMIN — CEFEPIME HYDROCHLORIDE SCH MLS/HR: 2 INJECTION, POWDER, FOR SOLUTION INTRAVENOUS at 20:37

## 2019-01-05 RX ADMIN — VERAPAMIL HYDROCHLORIDE SCH MG: 40 TABLET, FILM COATED ORAL at 20:37

## 2019-01-05 RX ADMIN — CEFEPIME HYDROCHLORIDE SCH MLS/HR: 2 INJECTION, POWDER, FOR SOLUTION INTRAVENOUS at 13:56

## 2019-01-05 RX ADMIN — CEFEPIME HYDROCHLORIDE SCH MLS/HR: 2 INJECTION, POWDER, FOR SOLUTION INTRAVENOUS at 05:01

## 2019-01-06 LAB
ANION GAP SERPL CALC-SCNC: 6 MMOL/L
BUN SERPL-SCNC: 18 MG/DL (ref 7–17)
CALCIUM SPEC-MCNC: 8.2 MG/DL (ref 8.4–10.2)
CELLS COUNTED: 100
CHLORIDE SERPL-SCNC: 108 MMOL/L (ref 98–107)
CO2 SERPL-SCNC: 27 MMOL/L (ref 22–30)
ERYTHROCYTE [DISTWIDTH] IN BLOOD BY AUTOMATED COUNT: 1.98 M/UL (ref 3.8–5.4)
ERYTHROCYTE [DISTWIDTH] IN BLOOD: 17.6 % (ref 11.5–15.5)
GLUCOSE SERPL-MCNC: 94 MG/DL (ref 74–99)
HCT VFR BLD AUTO: 20.4 % (ref 34–46)
HGB BLD-MCNC: 6.7 GM/DL (ref 11.4–16)
LYMPHOCYTES # BLD MANUAL: 0.8 K/UL (ref 1–4.8)
MAGNESIUM SPEC-SCNC: 1.5 MG/DL (ref 1.6–2.3)
MCH RBC QN AUTO: 34 PG (ref 25–35)
MCHC RBC AUTO-ENTMCNC: 33 G/DL (ref 31–37)
MCV RBC AUTO: 102.9 FL (ref 80–100)
MONOCYTES # BLD MANUAL: 0.14 K/UL (ref 0–1)
NEUTROPHILS NFR BLD MANUAL: 33 %
NEUTS SEG # BLD MANUAL: 0.46 K/UL (ref 1.3–7.7)
PLATELET # BLD AUTO: 20 K/UL (ref 150–450)
POTASSIUM SERPL-SCNC: 3.9 MMOL/L (ref 3.5–5.1)
SODIUM SERPL-SCNC: 141 MMOL/L (ref 137–145)
WBC # BLD AUTO: 1.4 K/UL (ref 3.8–10.6)

## 2019-01-06 RX ADMIN — CEFEPIME HYDROCHLORIDE SCH MLS/HR: 2 INJECTION, POWDER, FOR SOLUTION INTRAVENOUS at 06:14

## 2019-01-06 RX ADMIN — PANTOPRAZOLE SODIUM SCH MG: 40 INJECTION, POWDER, FOR SOLUTION INTRAVENOUS at 08:07

## 2019-01-06 RX ADMIN — VERAPAMIL HYDROCHLORIDE SCH MG: 80 TABLET ORAL at 21:39

## 2019-01-06 RX ADMIN — VERAPAMIL HYDROCHLORIDE SCH MG: 40 TABLET, FILM COATED ORAL at 06:14

## 2019-01-06 RX ADMIN — Medication SCH MG: at 11:39

## 2019-01-06 RX ADMIN — PANTOPRAZOLE SODIUM SCH MG: 40 INJECTION, POWDER, FOR SOLUTION INTRAVENOUS at 20:32

## 2019-01-06 RX ADMIN — MAGNESIUM SULFATE IN DEXTROSE SCH MLS/HR: 10 INJECTION, SOLUTION INTRAVENOUS at 19:00

## 2019-01-06 RX ADMIN — Medication SCH MG: at 20:32

## 2019-01-06 RX ADMIN — VERAPAMIL HYDROCHLORIDE SCH MG: 80 TABLET ORAL at 16:10

## 2019-01-06 RX ADMIN — CEFEPIME HYDROCHLORIDE SCH MLS/HR: 2 INJECTION, POWDER, FOR SOLUTION INTRAVENOUS at 21:39

## 2019-01-06 RX ADMIN — VERAPAMIL HYDROCHLORIDE SCH MG: 80 TABLET ORAL at 11:39

## 2019-01-06 RX ADMIN — CEFEPIME HYDROCHLORIDE SCH MLS/HR: 2 INJECTION, POWDER, FOR SOLUTION INTRAVENOUS at 14:56

## 2019-01-06 RX ADMIN — MAGNESIUM SULFATE IN DEXTROSE SCH MLS/HR: 10 INJECTION, SOLUTION INTRAVENOUS at 11:38

## 2019-01-06 NOTE — CONS
CONSULTATION



DATE OF SERVICE:

January 5, 2019.



REASON FOR CONSULTATION:

Ovarian carcinoma and severe anemia.



CHIEF COMPLAINT:

Shortness of breath and fatigue.



HISTORY OF PRESENT ILLNESS:

Bibiana is a very pleasant, 73 years old  lady very well known to me.  She

initially presented with abdominal pain and distention.  She had an ultrasound in her

workup.  She initially presented in October of 2016 with abdominal pain and distention

and was found to have ascites.  She underwent paracentesis on October 28, 2016.  The

cytology was consistent with metastatic carcinoma consistent was non mucinous ovarian

primary, and she had elevated CA-125 at that time. Her CT scan of the chest, abdomen

and pelvis revealed evidence of ascites, omental caking and small bilateral pleural

effusion.  The patient underwent optimal debulking surgery on 11/22/2016 with R1

resection.  Her surgery was complicated.  She underwent debulking surgery by Dr. Cisse at [facility] on 11/22/2016.  She had a R1 resection.  Her surgery was

complicated by pulmonary embolus.  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

serous adenocarcinoma.  The peritoneal washing was positive and she had peritoneal

metastasis over 2 cm beyond the pelvis and positive mesenteric lymph nodes.  The

patient subsequently started on systemic treatment with carboplatin and Taxol.  She had

a total of 6 cycles completed in April of 2017.  She did well until January of 2018

when she had a rising CA-125 and CT scan revealed evidence of disease progression of

her pelvis.  She started on second-line treatment with a combination of Gemzar and

Avastin.  She initially had a response to it which was started on March of 2018.  She

did not have a significant response to this combination and subsequently it was

discontinued.  She was started on 3rd line combination of carboplatin and Taxol and

Doxil, which was started in July of 2018 and she had been slowly responding to this

combination and she had partial response to this combination and she completed a total

of 6 cycles 2 weeks ago.  She was seen in the office yesterday.  She was extremely

hypotensive and severely anemic and she was referred to the hospital.  She received

blood transfusion and she was admitted to the hospital.  She feels much but much better

today after the blood transfusion.  She is overall tired, but her dyspnea is better.

Her fatigue is better.  She denies any fever, chills, nausea, vomiting.  No melena,

hematochezia, hematuria, hemoptysis, hematemesis or epistaxis.



PAST MEDICAL HISTORY:

In addition to what is stated above in regard to her metastatic ovarian carcinoma, she

has a history of atrial fibrillation, pulmonary embolus.



SURGICAL HISTORY:

She has debulking surgery for her ovarian carcinoma.  She has a history of

tonsillectomy, D and C, and tubal ligation in the past.



FAMILY HISTORY:

Her father had lung cancer.  Her mother had colon cancer and maternal aunt had breast

cancer.  Of note, the patient did have genetic testing which were negative.



REVIEW OF SYSTEMS:

As stated above in history of present illness, otherwise negative.



MEDICATION:

Medications and allergies are reviewed in her electronic medical record.



PHYSICAL EXAMINATION:

She is alert and oriented times three.  No acute distress.  Vital signs:  Temperature

98.  Afebrile.  Pulse 98, regular, respiration 18, blood pressure 135/85.  HEENT:

Normocephalic, atraumatic.  No obvious icterus.

NECK:  Supple.  No jugular venous distention.  CHEST: Clear to auscultation

bilaterally.  Lungs are clear to auscultation and percussion.  HEART:  Regular rate and

rhythm.

ABDOMEN:  Soft.  No obvious ascites.  No organomegaly or masses.  Bowel sounds present.

Extremities:  Trace edema.  Skin:  Reveals a few bruises.  No ecchymosis or petechiae.

LYMPHATICS:  No peripherally enlarged cervical or supraclavicular node.



LABORATORY DATA:

WBC of 1.2, hemoglobin is 8.0, hematocrit is 24.2, MCV is 103.4, platelets are 18,

absolute neutrophil count is 0.4.  Sodium 141, potassium is 4.4, chloride 107, CO2 is

28, BUN is 24, creatinine 0.8.



IMPRESSION:

1. Metastatic ovarian carcinoma with diagnostic and therapeutic circumstances stated

    above.

2. Chemotherapy-induced myelosuppression with severe anemia.  She also has neutropenia

    and significant thrombocytopenia.

3. Severe dyspnea.  This is likely related to her severe anemia.  Her dyspnea has

    improved after blood transfusion.

4. Atrial fibrillation on anticoagulation.



RECOMMENDATIONS AND DISCUSSION:

1. Continue to monitor blood count.

2. The patient despite her neutropenia she is afebrile at this point in time and she

    is afebrile at this time.  We will await final culture.  I would recommend to keep

    Eliquis on hold for now until her thrombocytopenia improves.

The above was discussed with the patient.  I have answered all her questions to her

satisfaction.



Thank you very much for asking me participate in the care of this nice lady.





MMFREDI / JOHNN: [id number] / Job#: [id number]

## 2019-01-06 NOTE — PN
PROGRESS NOTE



DATE OF SERVICE:

01/05/2019



This 73-year-old woman was admitted hypertension, atrial fibrillation, bradycardia,

severe pancytopenia.  The patient had metastatic ovarian cancer on chemotherapy.  The

blood pressure is significantly improved at this time.  The patient has symptomatic

anemia.  Today the hemoglobin is 8 but however white count is 1.2 and platelets 18.

Cultures are negative so far.  The patient being closely monitored.  No chest pain.  No

palpitations.  No fever.



EXAM:

Alert and oriented times three. Pulse 98, blood pressure 130/82, respiration 18,

temperature 98 degrees, pulse ox 97% on room air.

HEENT: Conjunctivae normal.

NECK: No jugular venous distention.

CARDIOVASCULAR: S1, S2 muffled.

RESPIRATORY: Breath sounds diminished in the bases. A few scattered rhonchi and

crackles.  Abdomen is soft, nontender.  Legs are no edema, no swelling.

CENTRAL NERVOUS SYSTEM: No focal deficits.



LABS:

WBC 7.2, hemoglobin is 8, and sodium is 141, potassium 4.4.



ASSESSMENT:

1. Atrial fibrillation with bradycardia, present on admission.

2. Severe pancytopenia.

3. Severe symptomatic anemia.  Hemoglobin 6.5 secondary to chemotherapy, status post

    blood transfusion.

4. Hypotension, possible multifactorial sepsis.

5. Metastatic ovarian cancer on chemotherapy.

6. Elevated creatinine 1.33 with acute renal failure, acute tubular necrosis, prerenal

    renal failure.

7. Hypomagnesemia.

8. Atrial flutter fibrillation.

9. History of hypertension.

10.Hyperlipidemia.

11.History of pulmonary embolism.

12.History of appendectomy.

13.History of cholecystectomy.

14.History of motion sickness.



RECOMMENDATIONS AND DISCUSSION:

Recommend to continue current medications, management and symptomatic treatment.

Otherwise, at this time, I recommend continue the IV fluids.  Creatinine is improved.

Cultures are negative.  Continue the rest of the medications.  Repeat labs.  Closely

follow with Hematology/Oncology.  Guarded prognosis.  Further recommendations to

follow.





MMODL / IJN: [id number] / Job#: [id number]

## 2019-01-06 NOTE — PN
PROGRESS NOTE



DATE OF SERVICE:

January 6, 2019.



CHIEF COMPLAINT:

Tired.



Bibiana is seen today as a followup.  She feels tired, but overall she feels better.  No

fever or chills.  No melena, hematochezia, hematuria, hemoptysis, or epistaxis.



CURRENT MEDICATION:

Reviewed in electronic record.



PHYSICAL EXAM:

She is alert, oriented x3.  No distress.  Vital signs temperature 98.0, afebrile, pulse

68 regular, respirations 16, blood pressure 132/66.  HEENT:  Normocephalic, atraumatic.

No icterus.

NECK:  Supple.  Chest was equal expansion bilaterally. Lungs clear to auscultation.

Heart is regular rate and rhythm.

ABDOMEN:  Soft.  No tenderness or ascites.

EXTREMITIES:  No edema.  Skin reveals a few bruises.  No ecchymosis or petechiae.



LABORATORY DATA:

WBC of 1.4, hemoglobin is 6.7, hematocrit 20.4, platelets are 20, absolute neutrophil

count 0.4.



IMPRESSION:

1. Metastatic ovarian carcinoma.  The patient has just completed 3rd line of systemic

    treatment.

2. Chemotherapy-induced myelosuppression with severe anemia.  The patient is also

    neutropenic and has significant thrombocytopenia.  However, she has no clinical

    evidence to suggest infection or sepsis at this current period of time.

3. Severe dyspnea.  This has improved after transfusion.

4. Atrial fibrillation requiring anticoagulation.



RECOMMENDATION:

1. We will proceed with transfusion with 2 additional units of packed red blood cells

    today.

2. Monitor patient's blood count.

3. Keep Eliquis on hold for now.  Awaiting further recovery of her platelet count.





MMODL / IJN: [id number] / Job#: [id number]

## 2019-01-06 NOTE — P.CRDCN
History of Present Illness


Consult date: 19


Requesting physician: Ed Madrigal


Reason for Consult (text): 





Bradycardia


Chief complaint: Progressive weakness and shortness of breath


History of present illness: 


This is a pleasant 73-year-old  patient who follows with Dr. Preston in 

the office.  She has history of paroxysmal atrial fibrillation, hyperlipidemia, 

hypertension, metastatic ovarian cancer, history of pulmonary embolism, 

currently undergoing chemotherapy with Dr. Hillman.  She presented to the 

hospital on this occasion with symptoms of progressive weakness with associated 

shortness of breath over the past several days.  A cardiology consultation was 

requested because initially on presentation she was quite bradycardic.  Chest x-

ray shows chronic changes in the right lung base.  EKG on admission showed 

atrial fibrillation with a slow ventricular response, right bundle branch block 

pattern and T-wave abnormality.  Blood pressure on arrival here 74/40 with a 

heart rate of 40, 98% on room air.  Blood pressure this morning 140/106, heart 

rate 90, respirations 16, 95% on room air.  White blood cell count 1.4, 

hemoglobin this morning 6.7, platelet count 20.  Sodium 141, potassium 3.9, BUN 

18, creatinine 0.7, on admission her BUN was 34 and creatinine was 1.3.  

Magnesium on admission 1.1, 1.5 this morning.  Troponin is -0.012.  Stool for 

occult blood is negative and digoxin level was 1.0 on admission.  The patient's 

home medications included verapamil 40 mg twice a day, metoprolol 50 mg twice a 

day, Zestril 10 mg twice a day, Lanoxin 125 g every other day and Eliquis 5 mg 

one tablet by mouth twice a day.  According to the patient, she cannot take ACE 

inhibitor's because of persistent cough.  At the time of my examination this 

morning, patient overall feels well, she is sitting up at her bedside.  Denies 

any shortness of breath, she does state that her appetite this morning is poor.

  No dizziness or lightheadedness.  Apparently on walking up to the bathroom to 

the night last night her heart rate did jump up into the 1940 range, this 

morning is remaining in the 80s to 90s.








Past Medical History


Past Medical History: Atrial Fibrillation, Atrial Flutter, Cancer, 

Hyperlipidemia, Hypertension, Pulmonary Embolus (PE)


Additional Past Medical History / Comment(s): Metastatic ovarian cancer with 

hysterectomy/debulking and chemotherapy last dose 18, pancytopenia d/t 

chemo, afib/flutter with RVR in past, 2 pulmonary embolisms that pt believes 

were on the R side, 18 pt had acute CHF with pleural effusion and R sided 

thoracentesis, diverticular disease.


History of Any Multi-Drug Resistant Organisms: None Reported


Past Surgical History: Appendectomy, Cholecystectomy, Hysterectomy, 

Tonsillectomy, Tubal Ligation


Additional Past Surgical History / Comment(s): Debulking surgery approximately 

, cervical polypectomies, colonoscopies.


Past Anesthesia/Blood Transfusion Reactions: Motion Sickness, Postoperative 

Nausea & Vomiting (PONV)


Additional Past Anesthesia/Blood Transfusion Reaction / Comment(s): Pt believes 

she recieved blood with her debulking surgery.


Smoking Status: Never smoker





- Past Family History


  ** Mother


Family Medical History: AFIB, Chest Pain / Angina, Diabetes Mellitus, 

Hypertension


Additional Family Medical History / Comment(s): Mother is 92 yrs old.





  ** Father


Family Medical History: Cancer


Additional Family Medical History / Comment(s): Father  of lung cancer at 

the age of 78yrs.





Medications and Allergies


 Home Medications











 Medication  Instructions  Recorded  Confirmed  Type


 


Apixaban [Eliquis] 5 mg PO BID #30 tab 08/10/16 01/04/19 Rx


 


Metoprolol Tartrate [Lopressor] 50 mg PO BID 10/20/16 01/04/19 History


 


Lisinopril [Zestril] 10 mg PO BID 18 History


 


Digoxin [Lanoxin] 125 mcg PO Q48H 19 History


 


Verapamil [Isoptin] 40 mg PO BID 19 History











 Allergies











Allergy/AdvReac Type Severity Reaction Status Date / Time


 


hydromorphone [From Dilaudid] AdvReac  Hallucinati Verified 19 09:18





   ons  














Physical Exam


Vitals: 


 Vital Signs











  Temp Pulse Pulse Resp BP Pulse Ox


 


 19 08:13  98.0 F   90  16  141/107  95


 


 19 04:00  97.5 F L  100   18  122/74  94 L


 


 19 00:00  97.9 F  78   18  125/77  95


 


 19 20:00  97.6 F  110 H   18  139/73  98


 


 19 16:00  98.0 F   98  18  135/82  96


 


 19 11:51  97.9 F   74  16  120/67  94 L


 


 19 08:48  98.0 F   111 H  16  143/72  94 L








 Intake and Output











 19





 22:59 06:59 14:59


 


Intake Total 200  240


 


Balance 200  240


 


Intake:   


 


  Oral 200  240


 


Other:   


 


  # Voids 4  


 


  Weight  87.4 kg 














PHYSICAL EXAMINATION: 





GENERAL: 73-year-old  female in no acute distress at the time of my 

examination





HEENT: Head is atraumatic, normocephalic.  Pupils equal, round.  Sclera 

anicteric. Conjunctiva are clear.  Mucous membranes of the mouth are moist.  

Neck is supple.  There is no elevated jugular venous pressure.  No carotid  

bruit is heard.





HEART EXAMINATION: Heart S1 and S2 irregularly irregular





CHEST EXAMINATION: Lungs are clear to auscultation and precussion. No chest 

wall tenderness is noted on palpation or with deep breathing.





ABDOMEN:  Soft, nontender. Bowel sounds are heard. No organomegaly noted.


 


EXTREMITIES: 2+ peripheral pulses with no evidence of peripheral edema and no 

calf tenderness noted.





NEUROLOGIC patient is awake, alert and oriented 3 .


 


.


 








Results





 19 05:50





 19 05:50


 CBC











  19 Range/Units





  05:50 


 


WBC  1.4 L*  (3.8-10.6)  k/uL


 


RBC  1.98 L  (3.80-5.40)  m/uL


 


Hgb  6.7 L*  (11.4-16.0)  gm/dL


 


Hct  20.4 L  (34.0-46.0)  %


 


Plt Count  20 L  (150-450)  k/uL








 Comprehensive Metabolic Panel











  19 Range/Units





  05:50 


 


Sodium  141  (137-145)  mmol/L


 


Potassium  3.9  (3.5-5.1)  mmol/L


 


Chloride  108 H  ()  mmol/L


 


Carbon Dioxide  27  (22-30)  mmol/L


 


BUN  18 H  (7-17)  mg/dL


 


Creatinine  0.77  (0.52-1.04)  mg/dL


 


Glucose  94  (74-99)  mg/dL


 


Calcium  8.2 L  (8.4-10.2)  mg/dL








 Current Medications











Generic Name Dose Route Start Last Admin





  Trade Name Freq  PRN Reason Stop Dose Admin


 


Acetaminophen  500 mg  19 12:33  





  Tylenol Tab  PO   





  Q6HR PRN   





  Fever and/ or Mild Pain   





     





     





     


 


Alprazolam  0.25 mg  19 12:33  





  Xanax  PO   





  TID PRN   





  Anxiety   





     





     





     


 


Cefepime HCl 2 gm/ Sodium  50 mls @ 100 mls/hr  19 14:00  19 06:14





  Chloride  IVPB   100 mls/hr





  Q8H SEDRICK   Administration





     





     





     





     


 


Magnesium Sulfate/Dextrose 1  100 mls @ 100 mls/hr  19 08:30  





  gm/ IV Solution  IVPB  19 10:29  





  Q1H SEDRICK   





     





     





     





     


 


Magnesium Oxide  500 mg  19 09:00  





  Mag-Ox  PO   





  BID SEDRICK   





     





     





     





     


 


Pantoprazole Sodium  40 mg  19 12:45  19 08:07





  Protonix  IVP   40 mg





  BID SEDRICK   Administration





     





     





     





     


 


Verapamil HCl  40 mg  19 21:00  19 06:14





  Isoptin  PO   40 mg





  BID SEDRICK   Administration





     





     





     





     








 Intake and Output











 19





 22:59 06:59 14:59


 


Intake Total 200  240


 


Balance 200  240


 


Intake:   


 


  Oral 200  240


 


Other:   


 


  # Voids 4  


 


  Weight  87.4 kg 








 





 19 05:50 





 19 05:50 











EKG Interpretations (text)


EKG shows atrial fibrillation with a slow ventricular response.








Assessment and Plan


Plan: 


Assessment and plan


#1 symptoms of progressive weakness with associated shortness of breath.


#2 atrial fibrillation with slow ventricular response, patient has history of 

paroxysmal atrial fibrillation, she was on Eliquis at home for anticoagulation


#3 metastatic ovarian cancer on chemotherapy


#4 severe pancytopenia with severe symptomatic anemia, hemoglobin in the 6 range


#5 mild renal insufficiency on admission, creatinine normal this morning.


#6 hypomagnesemia


#7 hypertension history, patient hypotensive on admission


#8 hyperlipidemia


#9 history of PE








Plan


Patient did have an echocardiogram with Doppler study performed in September 

which revealed an ejection fraction of 45-50%.  We'll repeat an echocardiogram 

with Doppler study.  Patient has been resumed on her verapamil 40 mg twice a day

, beta blocker and Lanoxin remain on hold.  We will not resume ACE inhibitor as 

patient has side effect of cough with ACE inhibitor, if blood pressure elevates 

we will start the patient on an angio retension blocker.  Eliquis is currently 

on hold because of anemia.  Check TSH level.  Further recommendations to follow.








DNP note has been reviewed, I agree with a documented findings and plan of 

care.  Patient was seen and examined.

## 2019-01-07 LAB
ANION GAP SERPL CALC-SCNC: 5 MMOL/L
BUN SERPL-SCNC: 14 MG/DL (ref 7–17)
CALCIUM SPEC-MCNC: 8.4 MG/DL (ref 8.4–10.2)
CELLS COUNTED: 200
CHLORIDE SERPL-SCNC: 105 MMOL/L (ref 98–107)
CO2 SERPL-SCNC: 29 MMOL/L (ref 22–30)
ERYTHROCYTE [DISTWIDTH] IN BLOOD BY AUTOMATED COUNT: 2.74 M/UL (ref 3.8–5.4)
ERYTHROCYTE [DISTWIDTH] IN BLOOD: 19.7 % (ref 11.5–15.5)
GLUCOSE SERPL-MCNC: 100 MG/DL (ref 74–99)
HCT VFR BLD AUTO: 26.4 % (ref 34–46)
HGB BLD-MCNC: 9 GM/DL (ref 11.4–16)
LYMPHOCYTES # BLD MANUAL: 0.67 K/UL (ref 1–4.8)
MAGNESIUM SPEC-SCNC: 1.5 MG/DL (ref 1.6–2.3)
MCH RBC QN AUTO: 32.7 PG (ref 25–35)
MCHC RBC AUTO-ENTMCNC: 33.9 G/DL (ref 31–37)
MCV RBC AUTO: 96.5 FL (ref 80–100)
MONOCYTES # BLD MANUAL: 0.2 K/UL (ref 0–1)
NEUTROPHILS NFR BLD MANUAL: 37 %
NEUTS SEG # BLD MANUAL: 0.5 K/UL (ref 1.3–7.7)
PLATELET # BLD AUTO: 24 K/UL (ref 150–450)
POTASSIUM SERPL-SCNC: 3.5 MMOL/L (ref 3.5–5.1)
SODIUM SERPL-SCNC: 139 MMOL/L (ref 137–145)
WBC # BLD AUTO: 1.4 K/UL (ref 3.8–10.6)

## 2019-01-07 RX ADMIN — PANTOPRAZOLE SODIUM SCH MG: 40 INJECTION, POWDER, FOR SOLUTION INTRAVENOUS at 08:01

## 2019-01-07 RX ADMIN — CEFEPIME HYDROCHLORIDE SCH MLS/HR: 2 INJECTION, POWDER, FOR SOLUTION INTRAVENOUS at 14:32

## 2019-01-07 RX ADMIN — METOPROLOL TARTRATE SCH MG: 25 TABLET, FILM COATED ORAL at 10:15

## 2019-01-07 RX ADMIN — CEFEPIME HYDROCHLORIDE SCH MLS/HR: 2 INJECTION, POWDER, FOR SOLUTION INTRAVENOUS at 21:19

## 2019-01-07 RX ADMIN — METOPROLOL TARTRATE SCH MG: 25 TABLET, FILM COATED ORAL at 21:13

## 2019-01-07 RX ADMIN — Medication SCH MG: at 08:01

## 2019-01-07 RX ADMIN — Medication SCH MG: at 21:13

## 2019-01-07 RX ADMIN — VERAPAMIL HYDROCHLORIDE SCH MG: 80 TABLET ORAL at 15:44

## 2019-01-07 RX ADMIN — VERAPAMIL HYDROCHLORIDE SCH MG: 80 TABLET ORAL at 21:14

## 2019-01-07 RX ADMIN — PANTOPRAZOLE SODIUM SCH MG: 40 INJECTION, POWDER, FOR SOLUTION INTRAVENOUS at 21:19

## 2019-01-07 RX ADMIN — CEFEPIME HYDROCHLORIDE SCH MLS/HR: 2 INJECTION, POWDER, FOR SOLUTION INTRAVENOUS at 06:15

## 2019-01-07 RX ADMIN — VERAPAMIL HYDROCHLORIDE SCH MG: 80 TABLET ORAL at 08:01

## 2019-01-07 NOTE — CDI
Documentation Clarification Form



Date: 1/7/2019 12:46:25 PM

From: Deana FELIX,RN,CCDS

Email: [email or web address]

MRN: [id number]

Admit Date: 1/4/2019 10:37:00 AM

Patient Name: Bibiana Potts

Visit Number: [id number]

Discharge Date:  





ATTENTION: The Clinical Documentation Specialists (CDI) and [facility] Coding Staff 
appreciate your assistance in clarifying documentation. Please respond to the 
clarification below the line at the bottom and electronically sign. The CDI & 
[facility] Coding staff will review the response and follow-up if needed. Please note: 
Queries are made part of the Legal Health Record. If you have any questions, 
please contact the author of this message via ITS.



Dr. Ed Madrigal



Further clarification is requested regarding the documentation of severe 
hypotension, HP and PN state hypotension, possibly multifactorial, sepsis 

Patient history/risk factors: 73-year-old woman admitted with atrial 
fibrillation, bradycardia, also had severe pancytopenia.Patient with 
symptomatic anemia, hx of htn, Metastatic ovarian CA with chemotherapy current 
and afib as well as PE, on metropolol, lisinopril and Verapamil for HTN/AFIB 

Clinical Indicators: Severe hypotension noted 2nd to anemia, possible sepsis   
TAYLOR 2nd to ATN Pancytopenia, myelosuppression 2nd to chemo with severe anemia, 

VS 74/46 P with bradycardia of 42  RR 16 

Labs: WBCS 1.3 : RBC 1.88 w hgb 6.5 PLTs 20 on presentation 



Treatment:IV Abxs, Fluid bolus, transfusion, medication adjustments. Lab 
monitoring , hold eliquis due to anemia



In your professional opinion, can you please render your opinion on the 
clinical significance of BP 74/46? 



Septic Shock

        

Cardiogenic Shock

         

Hypovolemic Shock

         

Hypotension unspecified  

       

Other, please specify_________

Unable to determine

(Last Revision: October 2017)

___________________________________________________________________________

Hypotension unspecified  
MTDD

## 2019-01-07 NOTE — CDI
Documentation Clarification Form



Date: 1/7/2019 12:27:38 PM

From: Deana FELIX,RN,CCDS

Email: [email or web address]

MRN: [id number]

Admit Date: 1/4/2019 10:37:00 AM

Patient Name: Bibiana Potts

Visit Number: [id number]

Discharge Date:  





ATTENTION: The Clinical Documentation Specialists (CDI) and [facility] Coding Staff 
appreciate your assistance in clarifying documentation. Please respond to the 
clarification below the line at the bottom and electronically sign. The CDI & 
[facility] Coding staff will review the response and follow-up if needed. Please note: 
Queries are made part of the Legal Health Record. If you have any questions, 
please contact the author of this message via ITS.



Dr. Tom Hillman

Severe pancytopenia is noted in Documentaiton, Also documented is chemo induced 
myelosuppresion with severe anemia,  Note of neutropenia and thrombocytopenia 

History/Risk factors:metastatic ovarain CA with chemo treatment

she was started on 3rd line combination of carboplatin and 

Taxol and Doxil, which was started in July of 2018 and she had been slowly 
responding to this combination and she had partial response to this combination 
and she completed 

a total of 6 cycles 2 weeks ago.

VS 74/46 P with bradycardia of 42  RR 16 

Clinical indicators: Severe hypotension and dyspnea. TAYLOR 2nd to ATN

Labs: WBCS 1.3 : RBC 1.88 w hgb 6.5 PLTs 20 on presentation 

Treatment:Transfusion, Fluids, lab monitoring, ABXS   





In your professional opinion, can you please render your opinion on the etiolgy 
of the severe pancytopenia  findings signify one of the following conditions?



Pancytopenia due to chemotherapy

Pancytopenia due to other, please specify

Other condition, please specify ____    

Unable to determine



(Last Revision: October 2017)

___________________________________________________________________________

MTDD

## 2019-01-07 NOTE — CDI
Documentation Clarification Form

Date:  1/7/2019

From: Deana FELIX,RN,CCDs

Email: [email or web address]

MRN: [id number]

Admit Date: 1/4/2019 10:37:00 AM

Patient Name: Bibiana Potts

Visit Number: [id number]

Discharge Date:  





ATTENTION: The Clinical Documentation Specialists (CDI) and [facility] Coding Staff 
appreciate your assistance in clarifying documentation. Please respond to the 
clarification below the line at the bottom and electronically sign. The CDI & 
[facility] Coding staff will review the response and follow-up if needed. Please note: 
Queries are made part of the Legal Health Record. If you have any questions, 
please contact the author of this message via ITS.



Dr. Ed Madrigal



Elevated Creatinine 1.33 with acute renal failure, acute tubular necrosis, pre 
renal failure is documented PN 1/5/2018

History/Risk Factors: severe hypotension, bradycardia, afib, htn, Severe 
pancytopenia with hgb 6.5 upon presentation, current treatment for metastatic 
Ovarian CA, possible sepsis

Patients baseline BUN/CR/GFR: unknown

Clinical Indicators: 1/4/2019  CR 1.33 to 0.87 on 1/5/2019 

Presenting 1/4 CR 1.33 GFR 40 and BUN 34

Current CR 0.72 GFR 85 and BUN 14



Treatment:   Transfusion, BP/AFIB med adjustments, IV fluid bolus, lab 
monitoring, I&Os



In order to capture the severity of condition, after study, please render your 
opinion on the documentation of ATN



ATN ruled out



ATN treated 



Other, please specify _________

Unable to determine



(Last Revision: April 2018)

___________________________________________________________________________

ATN treated 
MTDD

## 2019-01-07 NOTE — P.PN
Subjective


Progress Note Date: 01/07/19


Principal diagnosis: 





pancytopenia, dehydration, recent chemo





pt seen in f/u, she is feeling better then on admit, no fever, oral irritation, 

appetite is fair, no nausea, she is SOB with exertion, no progressive cough or 

purulent sputum, abd pain, dysuria, hematuria, diarrhea, swelling, bleeding or 

pain.  Moderate fatigue.





Objective





- Vital Signs


Vital signs: 


 Vital Signs











Temp  98.7 F   01/07/19 12:00


 


Pulse  80   01/07/19 12:00


 


Resp  18   01/07/19 12:00


 


BP  120/74   01/07/19 12:00


 


Pulse Ox  94 L  01/07/19 12:00








 Intake & Output











 01/06/19 01/07/19 01/07/19





 18:59 06:59 18:59


 


Intake Total 610 800 240


 


Balance 610 800 240


 


Weight  87 kg 


 


Intake:   


 


  Intake, IV Titration  100 





  Amount   


 


    Cefepime 2 gm In Sodium  100 





    Chloride 0.9% 50 ml @ 100   





    mls/hr IVPB Q8H [facility] Rx#:   





    [id number]   


 


  Oral 300 700 240


 


  Blood Product 310  


 


    Rc As-1  Unit 0  





    [id number]   


 


    Rc As-1  Unit 310  





    [id number]   


 


Other:   


 


  Voiding Method  Toilet Toilet


 


  # Voids 3 1 














- Constitutional


General appearance: Present: average body habitus, cooperative, no acute 

distress





- EENT


EENT Comment(s): 





dry mucous membranes


Eyes: Present: anicteric sclerae, EOMI





- Respiratory


Respiratory: bilateral: CTA





- Cardiovascular


Heart sounds: normal: S1, S2





- Peripheral edema


  ** foot


Peripheral Edema: bilateral: 1+





- Gastrointestinal


General gastrointestinal: Present: normal bowel sounds, soft.  Absent: absent 

bowel sounds, decreased bowel sounds, distended, hepatomegaly, hyperactive 

bowel sounds, organomegaly, rigid, scaphoid, splenomegaly, tenderness, 

umbilical hernia, ventral hernia





- Neurologic


Neurologic: Present: CNII-XII intact





- Musculoskeletal


Musculoskeletal: Present: generalized weakness, strength equal bilaterally





- Psychiatric


Psychiatric: Present: A&O x's 3, appropriate affect, intact judgment & insight





- Labs


CBC & Chem 7: 


 01/07/19 06:00





 01/07/19 06:00


Labs: 


 Abnormal Lab Results - Last 24 Hours (Table)











  01/04/19 01/07/19 01/07/19 Range/Units





  09:43 06:00 06:00 


 


WBC   1.4 L*   (3.8-10.6)  k/uL


 


RBC   2.74 L   (3.80-5.40)  m/uL


 


Hgb   9.0 L D   (11.4-16.0)  gm/dL


 


Hct   26.4 L   (34.0-46.0)  %


 


RDW   19.7 H   (11.5-15.5)  %


 


Plt Count   24 L   (150-450)  k/uL


 


Neutrophils # (Manual)   0.50 L   (1.3-7.7)  k/uL


 


Lymphocytes # (Manual)   0.67 L   (1.0-4.8)  k/uL


 


Nucleated RBCs   4 H   (0-0)  /100 WBC


 


Glucose    100 H  (74-99)  mg/dL


 


Magnesium    1.5 L  (1.6-2.3)  mg/dL


 


Crossmatch  See Detail    








 Microbiology - Last 24 Hours (Table)











 01/04/19 12:39 Blood Culture - Preliminary





 Blood    No Growth after 48 hours














Assessment and Plan


(1) Pancytopenia due to antineoplastic chemotherapy


Narrative/Plan: 


Hemoglobin stable, white blood cell count stable, ANC is 500 today.  Platelet 

count slightly increased to 24,000 today.





No transfusion is needed today.





Continue to hold anticoagulation due to platelet count.


Current Visit: Yes   Status: Acute   Priority: High   Code(s): D61.810 - 

ANTINEOPLASTIC CHEMOTHERAPY INDUCED PANCYTOPENIA; T45.1X5A - ADVERSE EFFECT OF 

ANTINEOPLASTIC AND IMMUNOSUP DRUGS, INIT   SNOMED Code(s): 786433789075051


   





(2) Ovarian cancer


Narrative/Plan: 


Patient is currently receiving treatment for the same.  She would be due for 

chemotherapy this week.  Chemotherapy will continue to be on hold until 

recovery.  Patient will be reevaluated by primary oncologist as there may need 

to be some adjustments in doses or may need additional supportive care.


Current Visit: Yes   Status: Acute   Priority: Medium   Code(s): C56.9 - 

MALIGNANT NEOPLASM OF UNSPECIFIED OVARY   SNOMED Code(s): 954495410


   





(3) Hypotension


Narrative/Plan: 


Likely related to dehydration and medications.  Cardiology following


Current Visit: Yes   Status: Acute   Priority: High   Code(s): I95.9 - 

HYPOTENSION, UNSPECIFIED   SNOMED Code(s): 63750260

## 2019-01-07 NOTE — P.PN
Subjective


Progress Note Date: 01/07/19





This is a pleasant 73-year-old  patient who follows with Dr. Preston in 

the office.  She has history of paroxysmal atrial fibrillation, hyperlipidemia, 

hypertension, metastatic ovarian cancer, history of pulmonary embolism, 

currently undergoing chemotherapy with Dr. Hillman.  She presented to the 

hospital on this occasion with symptoms of progressive weakness with associated 

shortness of breath over the past several days.  A cardiology consultation was 

requested because initially on presentation she was quite bradycardic.  Chest x-

ray shows chronic changes in the right lung base.  EKG on admission showed 

atrial fibrillation with a slow ventricular response, right bundle branch block 

pattern and T-wave abnormality.  Blood pressure on arrival here 74/40 with a 

heart rate of 40, 98% on room air.  Blood pressure this morning 140/106, heart 

rate 90, respirations 16, 95% on room air.  White blood cell count 1.4, 

hemoglobin this morning 6.7, platelet count 20.  Sodium 141, potassium 3.9, BUN 

18, creatinine 0.7, on admission her BUN was 34 and creatinine was 1.3.  

Magnesium on admission 1.1, 1.5 this morning.  Troponin is -0.012.  Stool for 

occult blood is negative and digoxin level was 1.0 on admission.  The patient's 

home medications included verapamil 40 mg twice a day, metoprolol 50 mg twice a 

day, Zestril 10 mg twice a day, Lanoxin 125 g every other day and Eliquis 5 mg 

one tablet by mouth twice a day.  According to the patient, she cannot take ACE 

inhibitor's because of persistent cough.  At the time of my examination this 

morning, patient overall feels well, she is sitting up at her bedside.  Denies 

any shortness of breath, she does state that her appetite this morning is poor.

  No dizziness or lightheadedness.  Apparently on walking up to the bathroom to 

the night last night her heart rate did jump up into the 01/31/1940 range, this 

morning is remaining in the 80s to 90s.





01/07/2019


Patient was seen and examined this morning, she feels well, denies any 

dizziness or lightheadedness.  Overall her heart rate has been stable in the 

60s and her blood pressure in the 120 range.  This morning it was documented 

that her pressure was 162/70 with a heart rate of 124, at the time of my 

examination her heart rate was 90.  We will have the nurse recheck the blood 

pressure and heart rate.  We will resume her metoprolol at a lower dose of 25 

twice a day.  Increase her activity today as tolerated, plan for possible 

discharge home soon.











Objective





- Vital Signs


Vital signs: 


 Vital Signs











Temp  98.6 F   01/07/19 08:00


 


Pulse  124 H  01/07/19 08:00


 


Resp  18   01/07/19 08:00


 


BP  163/74   01/07/19 08:00


 


Pulse Ox  94 L  01/07/19 08:00








 Intake & Output











 01/06/19 01/07/19 01/07/19





 18:59 06:59 18:59


 


Intake Total 610 800 240


 


Balance 610 800 240


 


Weight  87 kg 


 


Intake:   


 


  Intake, IV Titration  100 





  Amount   


 


    Cefepime 2 gm In Sodium  100 





    Chloride 0.9% 50 ml @ 100   





    mls/hr IVPB Q8H [facility] Rx#:   





    [id number]   


 


  Oral 300 700 240


 


  Blood Product 310  


 


    Rc As-1  Unit 0  





    [id number]   


 


    Rc As-1  Unit 310  





    [id number]   


 


Other:   


 


  Voiding Method  Toilet Toilet


 


  # Voids 3 1 














- Exam





PHYSICAL EXAMINATION: 





GENERAL: 73-year-old  female in no acute distress at the time of my 

examination





HEENT: Head is atraumatic, normocephalic.  Pupils equal, round.  Sclera 

anicteric. Conjunctiva are clear.  Mucous membranes of the mouth are moist.  

Neck is supple.  There is no elevated jugular venous pressure.  No carotid  

bruit is heard.





HEART EXAMINATION: Heart S1 and S2 irregularly irregular





CHEST EXAMINATION: Lungs are clear to auscultation and precussion. No chest 

wall tenderness is noted on palpation or with deep breathing.





ABDOMEN:  Soft, nontender. Bowel sounds are heard. No organomegaly noted.


 


EXTREMITIES: 2+ peripheral pulses with no evidence of peripheral edema and no 

calf tenderness noted.





NEUROLOGIC patient is awake, alert and oriented 3 .


 


.





- Labs


CBC & Chem 7: 


 01/07/19 06:00





 01/07/19 06:00


Labs: 


 Abnormal Lab Results - Last 24 Hours (Table)











  01/04/19 01/07/19 01/07/19 Range/Units





  09:43 06:00 06:00 


 


WBC   1.4 L*   (3.8-10.6)  k/uL


 


RBC   2.74 L   (3.80-5.40)  m/uL


 


Hgb   9.0 L D   (11.4-16.0)  gm/dL


 


Hct   26.4 L   (34.0-46.0)  %


 


RDW   19.7 H   (11.5-15.5)  %


 


Plt Count   24 L   (150-450)  k/uL


 


Neutrophils # (Manual)   0.50 L   (1.3-7.7)  k/uL


 


Lymphocytes # (Manual)   0.67 L   (1.0-4.8)  k/uL


 


Nucleated RBCs   4 H   (0-0)  /100 WBC


 


Glucose    100 H  (74-99)  mg/dL


 


Magnesium    1.5 L  (1.6-2.3)  mg/dL


 


Crossmatch  See Detail    








 Microbiology - Last 24 Hours (Table)











 01/04/19 12:39 Blood Culture - Preliminary





 Blood    No Growth after 48 hours














Assessment and Plan


Plan: 


Assessment and plan


#1 symptoms of progressive weakness with associated shortness of breath.


#2 atrial fibrillation with slow ventricular response, patient has history of 

paroxysmal atrial fibrillation, she was on Eliquis at home for anticoagulation


#3 metastatic ovarian cancer on chemotherapy


#4 severe pancytopenia with severe symptomatic anemia, hemoglobin in the 6 range


#5 mild renal insufficiency on admission, creatinine normal this morning.


#6 hypomagnesemia


#7 hypertension history, patient hypotensive on admission


#8 hyperlipidemia


#9 history of PE








Plan


Condition did have a repeat echocardiogram with Doppler study which remains 

pending.  We will review this.  Add a small dose of beta blocker to her 

medication regime.  It has been explained to the patient that at some point in 

time she may require implantation of a permanent pacemaker because it appears 

she does have an element of tachybradycardia syndrome.  We will make her a 

follow-up appointment to see Dr. BRYON Preston in the office post discharge.





DNP note has been reviewed, I agree with a documented findings and plan of 

care.  Patient was seen and examined.

## 2019-01-07 NOTE — CDI
Documentation Clarification Form



Date: 1/7/2019 1:07:17 PM

From: Deana FELIX,RN,CCDS

Email: [email or web address]

MRN: [id number]

Admit Date: 1/4/2019 10:37:00 AM

Patient Name: Bibiana Potts

Visit Number: [id number]

Discharge Date:  



ATTENTION: The Clinical Documentation Specialists (CDI) and [facility] Coding Staff 
appreciate your assistance in clarifying documentation. Please respond to the 
clarification below the line at the bottom and electronically sign. The CDI & 
[facility] Coding staff will review the response and follow-up if needed. Please note: 
Queries are made part of the Legal Health Record. If you have any questions, 
please contact the author of this message via ITS.



Dr. Ed Madrigal





Due to multiple impressions regarding sepsis further clarification is sought HP 
notes severe hypotension, possibly multifactorial, sepsis. Consultant notes 
despite her neutropenia she is afebrile at this point. She has no evidence to 
suggest infection or sepsis at this current period of time  



History/Risk factors: metastatic ovarian CA with chemo treatment, severe anemia
, severe pancytopenia, severe hypotension 

VS 74/46 P with bradycardia of 42 RR 16 

Clinical indicators: Severe hypotension and dyspnea. TAYLOR , Pancytopenia and 
severe anemia 

Labs: WBCS 1.3 : RBC 1.88 w hgb 6.5 PLTs 20 on presentation, BC NGT, Urine 
culture skin dora CR 1.33 to 0.87 next day

Clinical Indicators:Current /74  

Treatment: IV fluid bolus, transfusion, BP/AFIB  meds adjusted, Maxipime IVPB



In your opinion, what is the most clinically appropriate diagnosis for this 
patient?



Sepsis treated

Sepsis ruled out  

Other explanation of clinical findings

Unable to determine (no explanation for clinical findings)



(Last Revision: April 2018)

___________________________________________________________________________



Sepsis ruled out  
MTDD

## 2019-01-08 LAB
ANION GAP SERPL CALC-SCNC: 4 MMOL/L
BASOPHILS # BLD MANUAL: 0.02 K/UL (ref 0–0.2)
BUN SERPL-SCNC: 18 MG/DL (ref 7–17)
CALCIUM SPEC-MCNC: 8.5 MG/DL (ref 8.4–10.2)
CELLS COUNTED: 100
CHLORIDE SERPL-SCNC: 106 MMOL/L (ref 98–107)
CO2 SERPL-SCNC: 29 MMOL/L (ref 22–30)
ERYTHROCYTE [DISTWIDTH] IN BLOOD BY AUTOMATED COUNT: 2.77 M/UL (ref 3.8–5.4)
ERYTHROCYTE [DISTWIDTH] IN BLOOD: 18.4 % (ref 11.5–15.5)
GLUCOSE SERPL-MCNC: 92 MG/DL (ref 74–99)
HCT VFR BLD AUTO: 26.8 % (ref 34–46)
HGB BLD-MCNC: 9.2 GM/DL (ref 11.4–16)
LYMPHOCYTES # BLD MANUAL: 0.66 K/UL (ref 1–4.8)
MAGNESIUM SPEC-SCNC: 1.4 MG/DL (ref 1.6–2.3)
MCH RBC QN AUTO: 33.3 PG (ref 25–35)
MCHC RBC AUTO-ENTMCNC: 34.5 G/DL (ref 31–37)
MCV RBC AUTO: 96.5 FL (ref 80–100)
MONOCYTES # BLD MANUAL: 0.34 K/UL (ref 0–1)
NEUTROPHILS NFR BLD MANUAL: 37 %
NEUTS SEG # BLD MANUAL: 0.59 K/UL (ref 1.3–7.7)
PLATELET # BLD AUTO: 23 K/UL (ref 150–450)
POTASSIUM SERPL-SCNC: 3.9 MMOL/L (ref 3.5–5.1)
SODIUM SERPL-SCNC: 139 MMOL/L (ref 137–145)
WBC # BLD AUTO: 1.6 K/UL (ref 3.8–10.6)

## 2019-01-08 RX ADMIN — Medication SCH MG: at 19:59

## 2019-01-08 RX ADMIN — PANTOPRAZOLE SODIUM SCH MG: 40 INJECTION, POWDER, FOR SOLUTION INTRAVENOUS at 08:55

## 2019-01-08 RX ADMIN — Medication SCH MG: at 08:55

## 2019-01-08 RX ADMIN — METOPROLOL TARTRATE SCH MG: 25 TABLET, FILM COATED ORAL at 08:55

## 2019-01-08 RX ADMIN — CEFEPIME HYDROCHLORIDE SCH MLS/HR: 2 INJECTION, POWDER, FOR SOLUTION INTRAVENOUS at 05:52

## 2019-01-08 RX ADMIN — METOPROLOL TARTRATE SCH: 25 TABLET, FILM COATED ORAL at 20:00

## 2019-01-08 RX ADMIN — VERAPAMIL HYDROCHLORIDE SCH MG: 80 TABLET ORAL at 08:55

## 2019-01-08 RX ADMIN — Medication SCH MG: at 17:07

## 2019-01-08 RX ADMIN — CEFEPIME HYDROCHLORIDE SCH MLS/HR: 2 INJECTION, POWDER, FOR SOLUTION INTRAVENOUS at 18:27

## 2019-01-08 RX ADMIN — VERAPAMIL HYDROCHLORIDE SCH: 40 TABLET, FILM COATED ORAL at 17:05

## 2019-01-08 RX ADMIN — PANTOPRAZOLE SODIUM SCH MG: 40 TABLET, DELAYED RELEASE ORAL at 17:07

## 2019-01-08 RX ADMIN — VERAPAMIL HYDROCHLORIDE SCH MG: 40 TABLET, FILM COATED ORAL at 19:59

## 2019-01-08 NOTE — P.PN
Subjective


Progress Note Date: 01/08/19


Principal diagnosis: 





pancytopenia, dehydration, recent chemo





Patient seen today in follow-up.  She states feeling better today than 

yesterday.  She is actually being considered for a pacemaker.





Objective





- Vital Signs


Vital signs: 


 Vital Signs











Temp  98.0 F   01/08/19 12:09


 


Pulse  60   01/08/19 12:09


 


Resp  20   01/08/19 12:09


 


BP  128/84   01/08/19 12:09


 


Pulse Ox  96   01/08/19 12:09








 Intake & Output











 01/07/19 01/08/19 01/08/19





 18:59 06:59 18:59


 


Intake Total 290 1270 532


 


Output Total   600


 


Balance 290 1270 -68


 


Weight  87.1 kg 87.1 kg


 


Intake:   


 


  IV  220 


 


    normal saline  220 


 


  Intake, IV Titration 50 50 





  Amount   


 


    Cefepime 2 gm In Sodium 50 50 





    Chloride 0.9% 50 ml @ 100   





    mls/hr IVPB Q8H [facility] Rx#:   





    [id number]   


 


  Oral 240 1000 222


 


  Blood Product   310


 


    Rc As-1  Unit   310





    [id number]   


 


Output:   


 


  Urine   600


 


Other:   


 


  Voiding Method Toilet Toilet Toilet


 


  # Voids  3 














- Constitutional


General appearance: Present: cooperative, no acute distress





- EENT


Eyes: Present: EOMI


ENT: Present: hearing grossly normal, normal oropharynx





- Respiratory


Respiratory: bilateral: CTA





- Cardiovascular


Rhythm: irregularly irregular


Heart sounds: normal: S1, S2





- Peripheral edema


  ** leg


Peripheral Edema: bilateral: 2+





- Gastrointestinal


General gastrointestinal: Present: normal bowel sounds, soft





- Integumentary


Integumentary: Present: normal





- Neurologic


Neurologic: Present: CNII-XII intact





- Musculoskeletal


Musculoskeletal: Present: generalized weakness, strength equal bilaterally





- Psychiatric


Psychiatric: Present: A&O x's 3, appropriate affect, intact judgment & insight





- Labs


CBC & Chem 7: 


 01/08/19 05:50





 01/08/19 05:50


Labs: 


 Abnormal Lab Results - Last 24 Hours (Table)











  01/04/19 01/08/19 01/08/19 Range/Units





  09:43 05:50 05:50 


 


WBC   1.6 L   (3.8-10.6)  k/uL


 


RBC   2.77 L   (3.80-5.40)  m/uL


 


Hgb   9.2 L   (11.4-16.0)  gm/dL


 


Hct   26.8 L   (34.0-46.0)  %


 


RDW   18.4 H   (11.5-15.5)  %


 


Plt Count   23 L   (150-450)  k/uL


 


Neutrophils # (Manual)   0.59 L   (1.3-7.7)  k/uL


 


Lymphocytes # (Manual)   0.66 L   (1.0-4.8)  k/uL


 


BUN    18 H  (7-17)  mg/dL


 


Magnesium    1.4 L  (1.6-2.3)  mg/dL


 


Crossmatch  See Detail    








 Microbiology - Last 24 Hours (Table)











 01/04/19 12:39 Blood Culture - Preliminary





 Blood    No Growth after 96 hours














Assessment and Plan


(1) Pancytopenia due to antineoplastic chemotherapy


Narrative/Plan: 


From my review of the office chart it appears the patient has completed this 

particular course of chemotherapy.  Her last treatment was on December 18.  

When reviewing patient's CBC her counts do recover about the third week after 

treatment, which is this week.  CBC is noted to have stabilized, her white 

blood cell count is slowly beginning to increase, hemoglobin and platelets 

stable.  If patient is requiring cardiac pacemaker intervention would 

anticipate patient being able to do so with in the next 7-10 days.  


Current Visit: Yes   Status: Acute   Priority: High   Code(s): D61.810 - 

ANTINEOPLASTIC CHEMOTHERAPY INDUCED PANCYTOPENIA; T45.1X5A - ADVERSE EFFECT OF 

ANTINEOPLASTIC AND IMMUNOSUP DRUGS, INIT   SNOMED Code(s): 448508541043624


   





(2) Ovarian cancer


Narrative/Plan: 


I am double checking with Dr. Hillman regarding this particular treatment regimen 

being completed at this time or if there are plans for further maintenance 

therapy.  No further plans for treatment until followed up  by Dr. Hillman.


Current Visit: Yes   Status: Acute   Priority: Medium   Code(s): C56.9 - 

MALIGNANT NEOPLASM OF UNSPECIFIED OVARY   SNOMED Code(s): 078454205


   





(3) Hypotension


Narrative/Plan: 


Cardiology has seen the patient.  Patient has been worked up.  Did listen to 

cardiology explanation of patient diagnosis of tachycardia/bradycardia 

syndrome.  Plans are for a pacemaker once patient's counts have recovered from 

chemotherapy.


Current Visit: Yes   Status: Acute   Priority: High   Code(s): I95.9 - 

HYPOTENSION, UNSPECIFIED   SNOMED Code(s): 71769704

## 2019-01-08 NOTE — P.PN
Subjective


Progress Note Date: 01/08/19





This is a pleasant 73-year-old  patient who follows with Dr. Preston in 

the office.  She has history of paroxysmal atrial fibrillation, hyperlipidemia, 

hypertension, metastatic ovarian cancer, history of pulmonary embolism, 

currently undergoing chemotherapy with Dr. Hillman.  She presented to the 

hospital on this occasion with symptoms of progressive weakness with associated 

shortness of breath over the past several days.  A cardiology consultation was 

requested because initially on presentation she was quite bradycardic.  Chest x-

ray shows chronic changes in the right lung base.  EKG on admission showed 

atrial fibrillation with a slow ventricular response, right bundle branch block 

pattern and T-wave abnormality.  Blood pressure on arrival here 74/40 with a 

heart rate of 40, 98% on room air.  Blood pressure this morning 140/106, heart 

rate 90, respirations 16, 95% on room air.  White blood cell count 1.4, 

hemoglobin this morning 6.7, platelet count 20.  Sodium 141, potassium 3.9, BUN 

18, creatinine 0.7, on admission her BUN was 34 and creatinine was 1.3.  

Magnesium on admission 1.1, 1.5 this morning.  Troponin is -0.012.  Stool for 

occult blood is negative and digoxin level was 1.0 on admission.  The patient's 

home medications included verapamil 40 mg twice a day, metoprolol 50 mg twice a 

day, Zestril 10 mg twice a day, Lanoxin 125 g every other day and Eliquis 5 mg 

one tablet by mouth twice a day.  According to the patient, she cannot take ACE 

inhibitor's because of persistent cough.  At the time of my examination this 

morning, patient overall feels well, she is sitting up at her bedside.  Denies 

any shortness of breath, she does state that her appetite this morning is poor.

  No dizziness or lightheadedness.  Apparently on walking up to the bathroom to 

the night last night her heart rate did jump up into the 01/31/1940 range, this 

morning is remaining in the 80s to 90s.





01/07/2019


Patient was seen and examined this morning, she feels well, denies any 

dizziness or lightheadedness.  Overall her heart rate has been stable in the 

60s and her blood pressure in the 120 range.  This morning it was documented 

that her pressure was 162/70 with a heart rate of 124, at the time of my 

examination her heart rate was 90.  We will have the nurse recheck the blood 

pressure and heart rate.  We will resume her metoprolol at a lower dose of 25 

twice a day.  Increase her activity today as tolerated, plan for possible 

discharge home soon.








01/08/2019


Patient was seen and examined this morning, she was noted to have pauses of up 

to 3 seconds on the monitor.  For this reason, we had a discussion with her 

today regarding implantation of a permanent pacemaker as well as its risks and 

its benefits.  Dr. Montes De Oca did speak with Dr. BRYON Preston regarding this, Dr. Preston'

s concern regarding implantation of a pacer at this time was in reference to 

the low platelets.  The decision was made to decrease the dose of verapamil, 

continue to monitor the patient, discharge home and follow-up with Dr. ANNY Preston in the office.  We will also discuss with hematology regarding their 

expectations of improvement in platelet counts and white blood cell count and 

the need for pacemaker implantation.











Objective





- Vital Signs


Vital signs: 


 Vital Signs











Temp  98.0 F   01/08/19 12:09


 


Pulse  60   01/08/19 12:09


 


Resp  20   01/08/19 12:09


 


BP  128/84   01/08/19 12:09


 


Pulse Ox  96   01/08/19 12:09








 Intake & Output











 01/07/19 01/08/19 01/08/19





 18:59 06:59 18:59


 


Intake Total 290 1270 310


 


Balance 290 1270 310


 


Weight  87.1 kg 87.1 kg


 


Intake:   


 


  IV  220 


 


    normal saline  220 


 


  Intake, IV Titration 50 50 





  Amount   


 


    Cefepime 2 gm In Sodium 50 50 





    Chloride 0.9% 50 ml @ 100   





    mls/hr IVPB Q8H [facility] Rx#:   





    [id number]   


 


  Oral 240 1000 


 


  Blood Product   310


 


    Rc As-1  Unit   310





    [id number]   


 


Other:   


 


  Voiding Method Toilet Toilet Toilet


 


  # Voids  3 














- Exam





PHYSICAL EXAMINATION: 





GENERAL: 73-year-old  female in no acute distress at the time of my 

examination





HEENT: Head is atraumatic, normocephalic.  Pupils equal, round.  Sclera 

anicteric. Conjunctiva are clear.  Mucous membranes of the mouth are moist.  

Neck is supple.  There is no elevated jugular venous pressure.  No carotid  

bruit is heard.





HEART EXAMINATION: Heart S1 and S2 irregularly irregular





CHEST EXAMINATION: Lungs are clear to auscultation and precussion. No chest 

wall tenderness is noted on palpation or with deep breathing.





ABDOMEN:  Soft, nontender. Bowel sounds are heard. No organomegaly noted.


 


EXTREMITIES: 2+ peripheral pulses with no evidence of peripheral edema and no 

calf tenderness noted.





NEUROLOGIC patient is awake, alert and oriented 3 .


 


.





- Labs


CBC & Chem 7: 


 01/08/19 05:50





 01/08/19 05:50


Labs: 


 Abnormal Lab Results - Last 24 Hours (Table)











  01/04/19 01/08/19 01/08/19 Range/Units





  09:43 05:50 05:50 


 


WBC   1.6 L   (3.8-10.6)  k/uL


 


RBC   2.77 L   (3.80-5.40)  m/uL


 


Hgb   9.2 L   (11.4-16.0)  gm/dL


 


Hct   26.8 L   (34.0-46.0)  %


 


RDW   18.4 H   (11.5-15.5)  %


 


Plt Count   23 L   (150-450)  k/uL


 


Neutrophils # (Manual)   0.59 L   (1.3-7.7)  k/uL


 


Lymphocytes # (Manual)   0.66 L   (1.0-4.8)  k/uL


 


BUN    18 H  (7-17)  mg/dL


 


Magnesium    1.4 L  (1.6-2.3)  mg/dL


 


Crossmatch  See Detail    








 Microbiology - Last 24 Hours (Table)











 01/04/19 12:39 Blood Culture - Preliminary





 Blood    No Growth after 72 hours














Assessment and Plan


Plan: 


Assessment and plan


#1 symptoms of progressive weakness with associated shortness of breath.


#2 atrial fibrillation with slow ventricular response, patient has history of 

paroxysmal atrial fibrillation, she was on Eliquis at home for anticoagulation


#3 metastatic ovarian cancer on chemotherapy


#4 severe pancytopenia with severe symptomatic anemia, hemoglobin in the 6 range


#5 mild renal insufficiency on admission, creatinine normal this morning.


#6 hypomagnesemia


#7 hypertension history, patient hypotensive on admission


#8 hyperlipidemia


#9 history of PE








Plan


Patient was noted on the monitor to have up to 3 second pauses, evidence of 

tachycardia bradycardia syndrome overall.  White blood cell count 1.6, 

hemoglobin 9.2, platelet count 23.  At this time we will decrease the dose of 

verapamil, patient may be discharged and follow-up with Dr. ANNY Preston in the 

office.  We will also speak with hematology regarding their expectation of the 

patient's improvement in platelets and white blood cell count, and also the 

need for permanent pacemaker implantation.  Further recommendations then will 

be made.


DNP note has been reviewed, I agree with a documented findings and plan of 

care.  Patient was seen and examined.

## 2019-01-08 NOTE — PN
PROGRESS NOTE



DATE OF SERVICE:

01/07/2019.



PRESENTING COMPLAINT:

Tired.



INTERVAL HISTORY:

Patient presented to the ER from Dr. Hillman's office as patient was hypotensive with a

blood pressure down to the 70s and anemic at 6.6, feeling very weak and tired.  The

patient was found to be in atrial fibrillation with rapid ventricular rate in the

hospital. The patient was also transfused blood to get a total of 2 units. Earlier beta

blocker was added today.  There was a concern about tachy-tosha syndrome and getting

into pacemaker.  The patient did tolerate some diet.



REVIEW OF SYSTEMS:

Done for constitutional, cardiovascular, GI, pulmonary; relevant findings as above.



CURRENT MEDICATIONS:

Reviewed, include IV cefepime, Lopressor 25 mg b.i.d., verapamil 80 mg t.i.d.



EXAMINATION:

Temperature 98.2, pulse 86, respiratory rate 18, blood pressure 120/81, pulse 95

percent on room air.

GENERAL APPEARANCE:  Lying in bed, tired-appearing.

EYES: Pupils equal. Conjunctivae pale.

HEENT:  External appearance of ears and nose is normal. Oral cavity normal.

NECK: JVD unable to assess. Mass not palpable. Respiratory effort normal.

LUNGS: Fair air entry.

CARDIOVASCULAR: Heart sounds irregular. Minimal edema.

ABDOMEN:  Soft, minimal distention.  Liver and spleen not palpable.

PSYCHIATRY: Alert and oriented x3.  Mood and affect normal. A bit tired-appearing.



INVESTIGATIONS:

White count 1.4, hemoglobin 9, platelets 24,000, potassium 3.5. BUN and creatinine

normal.



ASSESSMENT:

1. Persistent atrial flutter/fibrillation with rapid ventricular rate and episodes of

    low heart rate with the possibility of patient getting a pacemaker.

2. Chronic congestive heart failure from diastolic dysfunction.  Ejection fraction 50%

    to 55%.

3. Essential hypertension.

4. Hyperlipidemia.

5. Obesity; BMI greater than 30.

6. Pancytopenia due to chemotherapy.

7. Metastatic ovarian cancer status post debulking surgery and chemotherapy therapy.

8. Hypomagnesemia.



PLAN:

Continue current medication and treatment plan. Follow with Cardiology and Oncology.

Overall prognosis is guarded.





MMODL / IJN: [id number] / Job#: [id number]

## 2019-01-09 VITALS — HEART RATE: 81 BPM | SYSTOLIC BLOOD PRESSURE: 127 MMHG | DIASTOLIC BLOOD PRESSURE: 92 MMHG | TEMPERATURE: 98.1 F

## 2019-01-09 VITALS — RESPIRATION RATE: 18 BRPM

## 2019-01-09 LAB
ANION GAP SERPL CALC-SCNC: 4 MMOL/L
BUN SERPL-SCNC: 17 MG/DL (ref 7–17)
CALCIUM SPEC-MCNC: 8.7 MG/DL (ref 8.4–10.2)
CELLS COUNTED: 100
CHLORIDE SERPL-SCNC: 107 MMOL/L (ref 98–107)
CO2 SERPL-SCNC: 28 MMOL/L (ref 22–30)
EOSINOPHIL # BLD MANUAL: 0.02 K/UL (ref 0–0.7)
ERYTHROCYTE [DISTWIDTH] IN BLOOD BY AUTOMATED COUNT: 2.99 M/UL (ref 3.8–5.4)
ERYTHROCYTE [DISTWIDTH] IN BLOOD: 18.2 % (ref 11.5–15.5)
GLUCOSE SERPL-MCNC: 104 MG/DL (ref 74–99)
HCT VFR BLD AUTO: 30 % (ref 34–46)
HGB BLD-MCNC: 10 GM/DL (ref 11.4–16)
LYMPHOCYTES # BLD MANUAL: 0.74 K/UL (ref 1–4.8)
MAGNESIUM SPEC-SCNC: 1.1 MG/DL (ref 1.6–2.3)
MCH RBC QN AUTO: 33.3 PG (ref 25–35)
MCHC RBC AUTO-ENTMCNC: 33.3 G/DL (ref 31–37)
MCV RBC AUTO: 100.1 FL (ref 80–100)
MONOCYTES # BLD MANUAL: 0.48 K/UL (ref 0–1)
NEUTROPHILS NFR BLD MANUAL: 48 %
NEUTS SEG # BLD MANUAL: 1.15 K/UL (ref 1.3–7.7)
PLATELET # BLD AUTO: 51 K/UL (ref 150–450)
POTASSIUM SERPL-SCNC: 3.8 MMOL/L (ref 3.5–5.1)
SODIUM SERPL-SCNC: 139 MMOL/L (ref 137–145)
WBC # BLD AUTO: 2.4 K/UL (ref 3.8–10.6)

## 2019-01-09 RX ADMIN — MAGNESIUM SULFATE IN DEXTROSE SCH MLS/HR: 10 INJECTION, SOLUTION INTRAVENOUS at 13:03

## 2019-01-09 RX ADMIN — CEFEPIME HYDROCHLORIDE SCH MLS/HR: 2 INJECTION, POWDER, FOR SOLUTION INTRAVENOUS at 06:30

## 2019-01-09 RX ADMIN — METOPROLOL TARTRATE SCH MG: 25 TABLET, FILM COATED ORAL at 09:01

## 2019-01-09 RX ADMIN — MAGNESIUM SULFATE IN DEXTROSE SCH MLS/HR: 10 INJECTION, SOLUTION INTRAVENOUS at 11:39

## 2019-01-09 RX ADMIN — VERAPAMIL HYDROCHLORIDE SCH MG: 40 TABLET, FILM COATED ORAL at 09:01

## 2019-01-09 RX ADMIN — Medication SCH MG: at 09:01

## 2019-01-09 RX ADMIN — PANTOPRAZOLE SODIUM SCH MG: 40 TABLET, DELAYED RELEASE ORAL at 06:30

## 2019-01-09 NOTE — ECHOF
Referral Reason:LVfunction



MEASUREMENTS

--------

HEIGHT: 160.0 cm

WEIGHT: 87.1 kg

BP: 

RVIDd:   3.3 cm     (< 3.3)

IVSd:   0.9 cm     (0.6 - 1.1)

LVIDd:   4.3 cm     (3.9 - 5.3)

LVPWd:   1.2 cm     (0.6 - 1.1)

IVSs:   1.4 cm

LVIDs:   3.3 cm

LVPWs:   1.8 cm

LAESV Index (A-L):   36.78 ml/m

Ao Diam:   2.8 cm     (2.0 - 3.7)

AV Cusp:   1.9 cm     (1.5 - 2.6)

LA Diam:   3.9 cm     (2.7 - 3.8)

RAP:   5.00 mmHg

RVSP:   35.30 mmHg







FINDINGS

--------

Atrial fibrillation.

This was a technically good study.

The left ventricular size is normal.   There is mild concentric left ventricular hypertrophy.   Overa
ll left ventricular systolic function is low-normal with, an EF between 50 - 55 %.

The right ventricle is normal in size and function.

LA is moderately dilated 34-39 ml/m2

The right atrium is normal in size.

Aortic valve is trileaflet and is mildly thickened.

The mitral valve leaflets are mildly thickened.   Mild mitral annular calcification present.   Severe
 mitral regurgitation is present.

Moderate tricuspid regurgitation present.   The right ventricular systolic pressure, as measured by D
oppler, is 35.30mmHg.

Pulmonic valve appears structurally normal.

The aortic root, ascending aorta and aortic arch are normal.



CONCLUSIONS

--------

1. Atrial fibrillation.

2. This was a technically good study.

3. The left ventricular size is normal.

4. There is mild concentric left ventricular hypertrophy.

5. Overall left ventricular systolic function is low-normal with, an EF between 50 - 55 %.

6. The right ventricle is normal in size and function.

7. LA is moderately dilated 34-39 ml/m2

8. The right atrium is normal in size.

9. Aortic valve is trileaflet and is mildly thickened.

10. The mitral valve leaflets are mildly thickened.

11. Mild mitral annular calcification present.

12. Severe mitral regurgitation is present.

13. Moderate tricuspid regurgitation present.

14. The right ventricular systolic pressure, as measured by Doppler, is 35.30mmHg.

15. Pulmonic valve appears structurally normal.

16. The aortic root, ascending aorta and aortic arch are normal.





SONOGRAPHER: Ama Ayala RDCS

## 2019-01-09 NOTE — PN
PROGRESS NOTE



DATE OF SERVICE:

January 8, 2019.



PRESENTING COMPLAINT:

Tired.



INTERVAL HISTORY:

This is a patient presented with hypotension, anemia, was transfused blood.  Also found

to in atrial fibrillation, rapid ventricular rate.  The patient did get 2 units of

blood.  The patient has also had sinus pauses.  Dose of verapamil is being cut back.

The concern about pacemaker currently is because of the low platelets.



REVIEW OF SYSTEMS:

Done for constitutional, cardiovascular, GI, pulmonary and relevant findings as above.



CURRENT MEDICATIONS:

Reviewed today include verapamil 40 mg 3 times a day. Lopressor 25 mg b.i.d., IV

cefepime.



PHYSICAL EXAMINATION:

VITAL SIGNS: Temperature 98.2, pulse 49, respiratory 20, blood pressure 139/79, pulse

ox 96 percent on room air.

GENERAL APPEARANCE:  Sitting on bed, awake.

EYES:  Pupils equal. Conjunctivae pale.

NECK:  JVD not raised.  Mass not palpable.

RESPIRATORY: Effort normal.

LUNGS:  Fair entry.

CARDIOVASCULAR: Heart sounds irregular.  Some edema.

ABDOMEN:  Soft, nontender.  Liver and spleen not palpable.

PSYCHIATRY:  Alert and oriented x3. Mood and affect normal.



INVESTIGATIONS:

White count 1.6, hemoglobin 9.2, platelets 23, potassium 3.9.



ASSESSMENT:

1. Persistent atrial flutter fibrillation, rapid ventricular rate and episodes of

    sinus pauses, possibly requiring a pacemaker.

2. Chronic congestive heart failure from diastolic dysfunction.  Ejection fraction 50-

    55 percent.

3. Essential hypertension.

4. Hyperlipidemia.

5. Obesity; BMI greater than 30.

6. Pancytopenia due to chemotherapy, expecting the counts to recover including

    platelets.

7. Metastatic ovarian cancer status post debulking surgery and chemotherapy.

8. Hypomagnesemia.



PLAN:

Care was discussed with the patient and her  at length.  We will see how the

patient does.  It may be that the patient may have to get a pacemaker as an outpatient

and restrict the patient's activity. For  the swelling in the lower extremity, will use

some Ace wraps.





MMODL / IJN: [id number] / Job#: [id number]

## 2019-01-09 NOTE — P.PN
Subjective


Progress Note Date: 01/09/19


Principal diagnosis: 





pancytopenia, dehydration, recent chemo





Pt seen in f/u, she is feeling rather well today, she is eating and drinking, 

no fevers, nausea, has not had any episodes of EMETERIO, chest pain or palpitations.

  Legs are still mildly swollen.  





Objective





- Vital Signs


Vital signs: 


 Vital Signs











Temp  98.1 F   01/09/19 12:09


 


Pulse  81   01/09/19 12:09


 


Resp  18   01/09/19 12:09


 


BP  127/92   01/09/19 12:09


 


Pulse Ox  95   01/09/19 12:09








 Intake & Output











 01/08/19 01/09/19 01/09/19





 18:59 06:59 18:59


 


Intake Total 532  


 


Output Total 600  


 


Balance -68  


 


Weight 87.1 kg 87.6 kg 


 


Intake:   


 


  Oral 222  


 


  Blood Product 310  


 


    Rc As-1  Unit 310  





    [id number]   


 


Output:   


 


  Urine 600  


 


Other:   


 


  Voiding Method Toilet  Toilet


 


  # Voids  1 














- Constitutional


General appearance: Present: average body habitus, cooperative, no acute 

distress





- EENT


Eyes: Present: anicteric sclerae, EOMI


ENT: Present: hearing grossly normal





- Respiratory


Respiratory: bilateral: CTA





- Cardiovascular


Heart sounds: normal: S1, S2





- Peripheral edema


  ** leg


Peripheral Edema: bilateral: Trace





- Gastrointestinal


General gastrointestinal: Present: normal bowel sounds, soft





- Integumentary


Integumentary: Present: normal turgor, pale





- Neurologic


Neurologic: Present: CNII-XII intact





- Musculoskeletal


Musculoskeletal: Present: strength equal bilaterally





- Psychiatric


Psychiatric: Present: A&O x's 3, appropriate affect, intact judgment & insight





- Labs


CBC & Chem 7: 


 01/09/19 05:58





 01/09/19 05:58


Labs: 


 Abnormal Lab Results - Last 24 Hours (Table)











  01/09/19 01/09/19 Range/Units





  05:58 05:58 


 


WBC  2.4 L   (3.8-10.6)  k/uL


 


RBC  2.99 L   (3.80-5.40)  m/uL


 


Hgb  10.0 L   (11.4-16.0)  gm/dL


 


Hct  30.0 L   (34.0-46.0)  %


 


MCV  100.1 H   (80.0-100.0)  fL


 


RDW  18.2 H   (11.5-15.5)  %


 


Plt Count  51 L D   (150-450)  k/uL


 


Neutrophils # (Manual)  1.15 L   (1.3-7.7)  k/uL


 


Lymphocytes # (Manual)  0.74 L   (1.0-4.8)  k/uL


 


Glucose   104 H  (74-99)  mg/dL


 


Magnesium   1.1 L  (1.6-2.3)  mg/dL








 Microbiology - Last 24 Hours (Table)











 01/04/19 12:39 Blood Culture - Preliminary





 Blood    No Growth after 96 hours














Assessment and Plan


(1) Pancytopenia due to antineoplastic chemotherapy


Narrative/Plan: 


Pt counts are increasing today as anticipated, she is 3 weeks out from chemo. 


Did review with Dr. Adam.  Pt counts are adequate for pacemaker insertion if 

Cardiology would like to do so.  


Current Visit: Yes   Status: Acute   Priority: High   Code(s): D61.810 - 

ANTINEOPLASTIC CHEMOTHERAPY INDUCED PANCYTOPENIA; T45.1X5A - ADVERSE EFFECT OF 

ANTINEOPLASTIC AND IMMUNOSUP DRUGS, INIT   SNOMED Code(s): 682311489347849


   





(2) Ovarian cancer


Narrative/Plan: 


Dr. Hillman confirmed that pt is done with IV treatment at this time.  She is due 

for treatment f/u imaging.  We will sched this for her outpatient with a f/u 

with Dr. Hillman after for plans for maintenance treatment if indicated.  Pt did 

verbalize understanding plan.  


Current Visit: Yes   Status: Acute   Priority: Medium   Code(s): C56.9 - 

MALIGNANT NEOPLASM OF UNSPECIFIED OVARY   SNOMED Code(s): 211768804


   





(3) Hypotension


Narrative/Plan: 


Cardiology has evaluated, worked up and has treatment plan for pt.  


Current Visit: Yes   Status: Acute   Priority: High   Code(s): I95.9 - 

HYPOTENSION, UNSPECIFIED   SNOMED Code(s): 80047443

## 2019-01-09 NOTE — P.PN
Subjective


Progress Note Date: 01/09/19





This is a pleasant 73-year-old  patient who follows with Dr. Preston in 

the office.  She has history of paroxysmal atrial fibrillation, hyperlipidemia, 

hypertension, metastatic ovarian cancer, history of pulmonary embolism, 

currently undergoing chemotherapy with Dr. Hillman.  She presented to the 

hospital on this occasion with symptoms of progressive weakness with associated 

shortness of breath over the past several days.  A cardiology consultation was 

requested because initially on presentation she was quite bradycardic.  Chest x-

ray shows chronic changes in the right lung base.  EKG on admission showed 

atrial fibrillation with a slow ventricular response, right bundle branch block 

pattern and T-wave abnormality.  Blood pressure on arrival here 74/40 with a 

heart rate of 40, 98% on room air.  Blood pressure this morning 140/106, heart 

rate 90, respirations 16, 95% on room air.  White blood cell count 1.4, 

hemoglobin this morning 6.7, platelet count 20.  Sodium 141, potassium 3.9, BUN 

18, creatinine 0.7, on admission her BUN was 34 and creatinine was 1.3.  

Magnesium on admission 1.1, 1.5 this morning.  Troponin is -0.012.  Stool for 

occult blood is negative and digoxin level was 1.0 on admission.  The patient's 

home medications included verapamil 40 mg twice a day, metoprolol 50 mg twice a 

day, Zestril 10 mg twice a day, Lanoxin 125 g every other day and Eliquis 5 mg 

one tablet by mouth twice a day.  According to the patient, she cannot take ACE 

inhibitor's because of persistent cough.  At the time of my examination this 

morning, patient overall feels well, she is sitting up at her bedside.  Denies 

any shortness of breath, she does state that her appetite this morning is poor.

  No dizziness or lightheadedness.  Apparently on walking up to the bathroom to 

the night last night her heart rate did jump up into the 01/31/1940 range, this 

morning is remaining in the 80s to 90s.





01/07/2019


Patient was seen and examined this morning, she feels well, denies any 

dizziness or lightheadedness.  Overall her heart rate has been stable in the 

60s and her blood pressure in the 120 range.  This morning it was documented 

that her pressure was 162/70 with a heart rate of 124, at the time of my 

examination her heart rate was 90.  We will have the nurse recheck the blood 

pressure and heart rate.  We will resume her metoprolol at a lower dose of 25 

twice a day.  Increase her activity today as tolerated, plan for possible 

discharge home soon.








01/08/2019


Patient was seen and examined this morning, she was noted to have pauses of up 

to 3 seconds on the monitor.  For this reason, we had a discussion with her 

today regarding implantation of a permanent pacemaker as well as its risks and 

its benefits.  Dr. Montes De Oca did speak with Dr. BRYON Preston regarding this, Dr. Preston'

s concern regarding implantation of a pacer at this time was in reference to 

the low platelets.  The decision was made to decrease the dose of verapamil, 

continue to monitor the patient, discharge home and follow-up with Dr. ANNY Preston in the office.  We will also discuss with hematology regarding their 

expectations of improvement in platelet counts and white blood cell count and 

the need for pacemaker implantation.








01/09/2019


Patient seen and examined this morning, yesterday the decision was made to wait 

a week to 10 days, if patient still required a pacemaker would be performed at 

that time.  She had no further pauses through the night, heart rate this 

morning in the 90s.  A cardiogram with Doppler study revealed an ejection 

fraction of 50-55%.  Blood cell count 2.4, hemoglobin 10, platelet count 51 

today.  Patient feels well, denies any dizziness or lightheadedness, no 

palpitations, no chest discomfort.











Objective





- Vital Signs


Vital signs: 


 Vital Signs











Temp  98.1 F   01/09/19 12:09


 


Pulse  81   01/09/19 12:09


 


Resp  18   01/09/19 12:09


 


BP  127/92   01/09/19 12:09


 


Pulse Ox  95   01/09/19 12:09








 Intake & Output











 01/08/19 01/09/19 01/09/19





 18:59 06:59 18:59


 


Intake Total 532  480


 


Output Total 600  


 


Balance -68  480


 


Weight 87.1 kg 87.6 kg 


 


Intake:   


 


  Oral 222  480


 


  Blood Product 310  


 


    Rc As-1  Unit 310  





    [id number]   


 


Output:   


 


  Urine 600  


 


Other:   


 


  Voiding Method Toilet  Toilet


 


  # Voids  1 2














- Exam





PHYSICAL EXAMINATION: 





GENERAL: 73-year-old  female in no acute distress at the time of my 

examination





HEENT: Head is atraumatic, normocephalic.  Pupils equal, round.  Sclera 

anicteric. Conjunctiva are clear.  Mucous membranes of the mouth are moist.  

Neck is supple.  There is no elevated jugular venous pressure.  No carotid  

bruit is heard.





HEART EXAMINATION: Heart S1 and S2 irregularly irregular





CHEST EXAMINATION: Lungs are clear to auscultation and precussion. No chest 

wall tenderness is noted on palpation or with deep breathing.





ABDOMEN:  Soft, nontender. Bowel sounds are heard. No organomegaly noted.


 


EXTREMITIES: 2+ peripheral pulses with no evidence of peripheral edema and no 

calf tenderness noted.





NEUROLOGIC patient is awake, alert and oriented 3 .


 


.





- Labs


CBC & Chem 7: 


 01/09/19 05:58





 01/09/19 05:58


Labs: 


 Abnormal Lab Results - Last 24 Hours (Table)











  01/09/19 01/09/19 Range/Units





  05:58 05:58 


 


WBC  2.4 L   (3.8-10.6)  k/uL


 


RBC  2.99 L   (3.80-5.40)  m/uL


 


Hgb  10.0 L   (11.4-16.0)  gm/dL


 


Hct  30.0 L   (34.0-46.0)  %


 


MCV  100.1 H   (80.0-100.0)  fL


 


RDW  18.2 H   (11.5-15.5)  %


 


Plt Count  51 L D   (150-450)  k/uL


 


Neutrophils # (Manual)  1.15 L   (1.3-7.7)  k/uL


 


Lymphocytes # (Manual)  0.74 L   (1.0-4.8)  k/uL


 


Glucose   104 H  (74-99)  mg/dL


 


Magnesium   1.1 L  (1.6-2.3)  mg/dL








 Microbiology - Last 24 Hours (Table)











 01/04/19 12:39 Blood Culture - Preliminary





 Blood    No Growth after 96 hours














Assessment and Plan


Plan: 


Assessment and plan


#1 symptoms of progressive weakness with associated shortness of breath.


#2 atrial fibrillation with slow ventricular response, patient has history of 

paroxysmal atrial fibrillation, she was on Eliquis at home for anticoagulation


#3 metastatic ovarian cancer on chemotherapy


#4 severe pancytopenia with severe symptomatic anemia, hemoglobin in the 6 range


#5 mild renal insufficiency on admission, creatinine normal this morning.


#6 hypomagnesemia


#7 hypertension history, patient hypotensive on admission


#8 hyperlipidemia


#9 history of PE








Plan


From cardiology's perspective, patient may be able to be discharged home.  We'

ll make her a follow-up appointment later this week or next week with Dr. ANNY Preston.  Pacemaker as an outpatient.








DNP note has been reviewed, I agree with a documented findings and plan of 

care.  Patient was seen and examined.

## 2019-01-10 NOTE — DS
DISCHARGE SUMMARY



DATE OF ADMISSION:

01/04/2019.



DATE OF DISCHARGE:

01/09/2019.



FINAL DIAGNOSES:

1. Persistent atrial flutter fibrillation with rapid ventricular rate and sinus pauses

    with tachy tosha syndrome.

2. Chronic congestive heart failure from diastolic dysfunction.  Ejection fraction 50%

    to 55%.

3. Essential hypertension.

4. Hyperlipidemia.

5. Obesity; BMI greater than 50.

6. Severe pancytopenia due to chemotherapy.

7. Metastatic ovarian cancer status post debulking surgery and chemotherapy.

8. Hypomagnesemia.

9. Hypotension from volume loss.

10.No evidence of sepsis.



HOSPITAL COURSE:

This patient was getting chemotherapy. Presented weak, tired, run down. Was found to be

hypotensive. Was given fluids. Initially felt to be sepsis, but actually no evidence of

sepsis.  The patient was in acute renal failure causing hypotension.  The creatinine

did come down nicely.  The patient has persistent atrial flutter fibrillation.

Medications were adjusted, but the patient started having sinus pauses.  The patient

required a pacemaker, but because of low platelet count, Cardiology decided to defer

this for right now until platelet count is more comfortable. Today did speak to [facility]

from Hematology. From their standpoint, the patient can for procedure. Also spoke to

Terri Lopez from Cardiology.  The patient will be seen by Dr. ROOSEVELT Preston in

outpatient and he will schedule pacemaker. I discussed this with the patient.

Questions were answered. Total time is spent in discussion and discharge planning more

than 35 minutes.



PHYSICAL EXAMINATION:

Temperature 98.1, pulse 81, respiratory rate 18, blood pressure 127/92, pulse ox 95% on

room air.

LUNGS: Fair air entry.

CARDIOVASCULAR: Heart sounds irregular. Some edema present.



INVESTIGATIONS:

White count 2.4, hemoglobin 10, platelets 51, BUN ______, creatinine 0.75.  The

patient's creatinine on admission was 1.33.



DISCHARGE MEDICATIONS:

1. Magnesium oxide 250 mg 3 times a day.

2. Lopressor 25 mg b.i.d.

3. Isoptin 40 mg t.i.d.

4. Discontinued medications include Eliquis, Zestril and digoxin.



FOLLOWUP:

1. Follow up with Dr. ROOSEVELT Preston on 01/18/2019.

2. Follow up with Dr. Garibay on 01/15/2019.

3. Follow up with Dr. Hillman on 01/21/2019.



OTHER INSTRUCTIONS:

CBC next week.





MMODL / IJN: [id number] / Job#: [id number]

## 2019-01-16 ENCOUNTER — HOSPITAL ENCOUNTER (OUTPATIENT)
Dept: HOSPITAL 47 - RADCTMAIN | Age: 74
Discharge: HOME | End: 2019-01-16
Attending: INTERNAL MEDICINE
Payer: MEDICARE

## 2019-01-16 DIAGNOSIS — K66.8: ICD-10-CM

## 2019-01-16 DIAGNOSIS — C56.9: Primary | ICD-10-CM

## 2019-01-16 DIAGNOSIS — J90: ICD-10-CM

## 2019-01-16 DIAGNOSIS — Z88.5: ICD-10-CM

## 2019-01-16 PROCEDURE — 74177 CT ABD & PELVIS W/CONTRAST: CPT

## 2019-01-16 PROCEDURE — 71260 CT THORAX DX C+: CPT

## 2019-01-16 NOTE — CT
EXAMINATION TYPE: CT ChestAbdPelvis w con

 

DATE OF EXAM: 1/16/2019

 

COMPARISON: CT chest abdomen pelvis November 12, 2013 and older studies.

 

HISTORY: Ovarian cancer just completed chemotherapy December 18.

 

CT DLP: 1164.4 mGycm. Automated Exposure Control for Dose Reduction was Utilized.

 

CONTRAST: 

CT scan of the thorax, abdomen and pelvis is performed with IV Contrast, patient injected with 100 mL
 of Isovue 300.

 

FINDINGS:

 

LUNGS: There is stable small right pleural effusion. No new suspicious nodules or masses are identifi
ed. There is persistent linear scarring and/or atelectasis anteriorly in the left midlung and anterio
rly in the right midlung perhaps posttreatment change near axial image 29. Some peripheral groundglas
s opacity in the anterolateral left mid lung near axial image 26 is unchanged from most recent CT. No
 left-sided effusion is seen. Tracheobronchial tree is patent. Elevated right hemidiaphragm is redemo
nstrated.

 

MEDIASTINUM: There are no greater than 1 cm hilar or mediastinal lymph nodes.   No pericardial effusi
on is seen.  Cardiomegaly is redemonstrated

 

OTHER:  No additional significant abnormality is seen.

 

LIVER/GB: Subcentimeter lesion posterior right hepatic dome axial image 42 is not significantly holland
ed from prior CTs. Gallbladder is surgically absent. Stable trace ascites anterolateral aspect of jun
er axial image 47

 

PANCREAS: No significant abnormality is seen.

 

SPLEEN: No significant abnormality is seen.

 

ADRENALS: Nonspecific Right adrenal mass measures 2.3 x 1.8 cm current study axial image 54 not signi
ficantly changed from prior studies.

 

KIDNEYS: No significant abnormality is seen.

 

BOWEL: Surgical sutures from prior bowel surgery with real anastomosis distal sigmoid colon of pelvis
 axial image 102 are redemonstrated.

 

GENITAL ORGANS: Uterus is surgically absent. Right-sided pelvic phleboliths are present.

 

LYMPH NODES: No greater than 1cm abdominal or pelvic lymph nodes are appreciated. Prominent but subce
ntimeter left external iliac chain lymph node axial image 98 is not as well seen on most recent prior
 but not significantly changed from June 29, 2018 prior study axial image 98.

 

OSSEOUS STRUCTURES: There is moderate multilevel spurring in the spine. There is facet arthropathy in
 the mid to lower lumbar spine.

 

OTHER: No new ascites is evident. There is stable 9 x 8 mm mid abdominal irregular mesenteric lesion 
axial image 74 and coronal image 27 not significant change from recent CT axial image 73.

 

IMPRESSION: No progression in the abdominal ascites. Stable small right pleural effusion. No new mass
es or adenopathy identified. Stable mid abdominal mesenteric lesion.

## 2019-03-12 ENCOUNTER — HOSPITAL ENCOUNTER (OUTPATIENT)
Dept: HOSPITAL 47 - RADUSMAIN | Age: 74
Discharge: HOME | End: 2019-03-12
Attending: INTERNAL MEDICINE
Payer: MEDICARE

## 2019-03-12 DIAGNOSIS — J90: Primary | ICD-10-CM

## 2019-03-12 PROCEDURE — 76604 US EXAM CHEST: CPT

## 2019-03-12 NOTE — US
EXAMINATION TYPE: US chest

 

DATE OF EXAM: 3/12/2019

 

COMPARISON: NONE

 

CLINICAL HISTORY: J90 PLEURAL EFFUSION. Pleural effusion

 

TECHNIQUE:  Targeted ultrasound of the posterior lower bilateral hemithoraces

 

EXAM MEASUREMENTS:

 

Right Pleural Effusion pocket size:  4.4 cm

**  Right skin surface to fluid distance:  3.9 cm

Left Pleural Effusion pocket size:  no significant fluid collection visualized at this time 

 

 

 

Right side marked for possible thoracentesis outside the dept.

 

Left side NOT marked for possible thoracentesis outside the dept.

 

Pulmonologists are able to review the images in the patient?s EMR.  

 

 

 

 

 

IMPRESSIONS: There is a moderate right pleural effusion

## 2019-03-15 ENCOUNTER — HOSPITAL ENCOUNTER (OUTPATIENT)
Dept: HOSPITAL 47 - PROCWHC3 | Age: 74
End: 2019-03-15
Attending: INTERNAL MEDICINE
Payer: MEDICARE

## 2019-03-15 ENCOUNTER — HOSPITAL ENCOUNTER (OUTPATIENT)
Dept: HOSPITAL 47 - RADUSMAIN | Age: 74
Discharge: HOME | End: 2019-03-15
Attending: INTERNAL MEDICINE
Payer: MEDICARE

## 2019-03-15 VITALS
SYSTOLIC BLOOD PRESSURE: 117 MMHG | HEART RATE: 80 BPM | DIASTOLIC BLOOD PRESSURE: 78 MMHG | TEMPERATURE: 98 F | RESPIRATION RATE: 16 BRPM

## 2019-03-15 VITALS — DIASTOLIC BLOOD PRESSURE: 76 MMHG | SYSTOLIC BLOOD PRESSURE: 134 MMHG | HEART RATE: 106 BPM

## 2019-03-15 VITALS — RESPIRATION RATE: 18 BRPM | TEMPERATURE: 97.6 F

## 2019-03-15 DIAGNOSIS — J91.8: Primary | ICD-10-CM

## 2019-03-15 DIAGNOSIS — Z53.8: ICD-10-CM

## 2019-03-15 DIAGNOSIS — J90: Primary | ICD-10-CM

## 2019-03-15 DIAGNOSIS — R91.8: ICD-10-CM

## 2019-03-15 LAB
CELL CNT PNL FLD: 100
DEPRECATED POLYS # FLD: 13 %
INR PPP: 1 (ref ?–1.2)
MONONUC CELLS # FLD: 87 %
PLATELET # BLD AUTO: 90 K/UL (ref 150–450)
PT BLD: 10.9 SEC (ref 9–12)

## 2019-03-15 PROCEDURE — 32555 ASPIRATE PLEURA W/ IMAGING: CPT

## 2019-03-15 PROCEDURE — 88342 IMHCHEM/IMCYTCHM 1ST ANTB: CPT

## 2019-03-15 PROCEDURE — 88341 IMHCHEM/IMCYTCHM EA ADD ANTB: CPT

## 2019-03-15 PROCEDURE — 87070 CULTURE OTHR SPECIMN AEROBIC: CPT

## 2019-03-15 PROCEDURE — 71045 X-RAY EXAM CHEST 1 VIEW: CPT

## 2019-03-15 PROCEDURE — 85049 AUTOMATED PLATELET COUNT: CPT

## 2019-03-15 PROCEDURE — 87075 CULTR BACTERIA EXCEPT BLOOD: CPT

## 2019-03-15 PROCEDURE — 88305 TISSUE EXAM BY PATHOLOGIST: CPT

## 2019-03-15 PROCEDURE — 84157 ASSAY OF PROTEIN OTHER: CPT

## 2019-03-15 PROCEDURE — 87205 SMEAR GRAM STAIN: CPT

## 2019-03-15 PROCEDURE — 82945 GLUCOSE OTHER FLUID: CPT

## 2019-03-15 PROCEDURE — 85610 PROTHROMBIN TIME: CPT

## 2019-03-15 PROCEDURE — 83615 LACTATE (LD) (LDH) ENZYME: CPT

## 2019-03-15 PROCEDURE — 89050 BODY FLUID CELL COUNT: CPT

## 2019-03-15 PROCEDURE — 36415 COLL VENOUS BLD VENIPUNCTURE: CPT

## 2019-03-15 PROCEDURE — 88108 CYTOPATH CONCENTRATE TECH: CPT

## 2019-03-15 NOTE — US
Ultrasound-guided therapeutic and diagnostic thoracentesis

 

DATE OF EXAM:  3/15/2019

 

CLINICAL HISTORY:  Right pleural effusion

 

The procedure was discussed with the patient. The risks, complications, benefits, and alternatives we
re discussed and any questions were answered. Informed consent was obtained. 

 

The patient was placed supine on the ultrasound table and prepped and draped in the usual sterile fas
hion. All elements of maximal barrier and sterile technique were utilized.  

 

Under ultrasound guidance, access into the 

 pleural space was obtained, via the thoracentesis catheter system and direct ultrasound guidance. Ap
proximately 0.45 liters of straw-colored fluid was removed.

 

The patient was stable throughout the procedure and remained stable upon discharge from Department of
 Radiology.

 

 

IMPRESSION: 

1. Successful therapeutic and diagnostic thoracentesis under ultrasound guidance.

## 2019-03-15 NOTE — XR
EXAMINATION TYPE: XR chest 1V portable

 

DATE OF EXAM: 3/15/2019

 

COMPARISON: 3/12/2019

 

HISTORY: Post right thoracentesis

 

TECHNIQUE: Single frontal view of the chest is obtained.

 

FINDINGS:  Cardiothymic and cardiac device noted. Chronic elevation the right hemidiaphragm noted and
 there is interval reduction in amount pleural fluid and consolidation on the right. No sizable pneum
othorax. Atherosclerotic change aorta. Diffuse osteopenia.

 

IMPRESSION:  No pneumothorax post thoracentesis. Interval improvement in appearance of the chest.

## 2019-03-16 LAB — PROT FLD-MCNC: 2600 MG/DL

## 2019-03-17 LAB — NUC CELL # FLD: 515 /UL

## 2019-07-03 ENCOUNTER — HOSPITAL ENCOUNTER (INPATIENT)
Dept: HOSPITAL 47 - EC | Age: 74
LOS: 6 days | Discharge: HOME | DRG: 291 | End: 2019-07-09
Attending: HOSPITALIST | Admitting: HOSPITALIST
Payer: MEDICARE

## 2019-07-03 VITALS — BODY MASS INDEX: 31.3 KG/M2

## 2019-07-03 DIAGNOSIS — I50.33: ICD-10-CM

## 2019-07-03 DIAGNOSIS — N17.0: ICD-10-CM

## 2019-07-03 DIAGNOSIS — Z79.899: ICD-10-CM

## 2019-07-03 DIAGNOSIS — Z80.1: ICD-10-CM

## 2019-07-03 DIAGNOSIS — N18.3: ICD-10-CM

## 2019-07-03 DIAGNOSIS — Z83.3: ICD-10-CM

## 2019-07-03 DIAGNOSIS — I48.92: ICD-10-CM

## 2019-07-03 DIAGNOSIS — C56.9: ICD-10-CM

## 2019-07-03 DIAGNOSIS — I48.1: ICD-10-CM

## 2019-07-03 DIAGNOSIS — R11.0: ICD-10-CM

## 2019-07-03 DIAGNOSIS — Z95.0: ICD-10-CM

## 2019-07-03 DIAGNOSIS — C79.9: ICD-10-CM

## 2019-07-03 DIAGNOSIS — Z90.49: ICD-10-CM

## 2019-07-03 DIAGNOSIS — I08.1: ICD-10-CM

## 2019-07-03 DIAGNOSIS — J90: ICD-10-CM

## 2019-07-03 DIAGNOSIS — Z88.5: ICD-10-CM

## 2019-07-03 DIAGNOSIS — Z79.01: ICD-10-CM

## 2019-07-03 DIAGNOSIS — Z86.711: ICD-10-CM

## 2019-07-03 DIAGNOSIS — Z90.710: ICD-10-CM

## 2019-07-03 DIAGNOSIS — R19.7: ICD-10-CM

## 2019-07-03 DIAGNOSIS — K57.90: ICD-10-CM

## 2019-07-03 DIAGNOSIS — E66.9: ICD-10-CM

## 2019-07-03 DIAGNOSIS — I13.0: Primary | ICD-10-CM

## 2019-07-03 DIAGNOSIS — Z82.49: ICD-10-CM

## 2019-07-03 DIAGNOSIS — E78.5: ICD-10-CM

## 2019-07-03 LAB
ALBUMIN SERPL-MCNC: 3.8 G/DL (ref 3.5–5)
ALP SERPL-CCNC: 131 U/L (ref 38–126)
ALT SERPL-CCNC: 16 U/L (ref 9–52)
ANION GAP SERPL CALC-SCNC: 13 MMOL/L
APTT BLD: 19.7 SEC (ref 22–30)
AST SERPL-CCNC: 37 U/L (ref 14–36)
BASOPHILS # BLD AUTO: 0 K/UL (ref 0–0.2)
BASOPHILS NFR BLD AUTO: 1 %
BUN SERPL-SCNC: 31 MG/DL (ref 7–17)
CALCIUM SPEC-MCNC: 9.2 MG/DL (ref 8.4–10.2)
CHLORIDE SERPL-SCNC: 98 MMOL/L (ref 98–107)
CK SERPL-CCNC: 38 U/L (ref 30–135)
CO2 SERPL-SCNC: 29 MMOL/L (ref 22–30)
EOSINOPHIL # BLD AUTO: 0.2 K/UL (ref 0–0.7)
EOSINOPHIL NFR BLD AUTO: 2 %
ERYTHROCYTE [DISTWIDTH] IN BLOOD BY AUTOMATED COUNT: 2.93 M/UL (ref 3.8–5.4)
ERYTHROCYTE [DISTWIDTH] IN BLOOD: 18.6 % (ref 11.5–15.5)
GLUCOSE SERPL-MCNC: 177 MG/DL (ref 74–99)
HCT VFR BLD AUTO: 35.9 % (ref 34–46)
HGB BLD-MCNC: 11.7 GM/DL (ref 11.4–16)
INR PPP: 1.3 (ref ?–1.2)
LYMPHOCYTES # SPEC AUTO: 0.6 K/UL (ref 1–4.8)
LYMPHOCYTES NFR SPEC AUTO: 10 %
MAGNESIUM SPEC-SCNC: 1.3 MG/DL (ref 1.6–2.3)
MCH RBC QN AUTO: 40 PG (ref 25–35)
MCHC RBC AUTO-ENTMCNC: 32.7 G/DL (ref 31–37)
MCV RBC AUTO: 122.3 FL (ref 80–100)
MONOCYTES # BLD AUTO: 0.2 K/UL (ref 0–1)
MONOCYTES NFR BLD AUTO: 4 %
NEUTROPHILS # BLD AUTO: 5.2 K/UL (ref 1.3–7.7)
NEUTROPHILS NFR BLD AUTO: 83 %
PLATELET # BLD AUTO: 215 K/UL (ref 150–450)
POTASSIUM SERPL-SCNC: 4.1 MMOL/L (ref 3.5–5.1)
PROT SERPL-MCNC: 6.6 G/DL (ref 6.3–8.2)
PT BLD: 13.1 SEC (ref 9–12)
SODIUM SERPL-SCNC: 140 MMOL/L (ref 137–145)
WBC # BLD AUTO: 6.3 K/UL (ref 3.8–10.6)

## 2019-07-03 PROCEDURE — 87205 SMEAR GRAM STAIN: CPT

## 2019-07-03 PROCEDURE — 83880 ASSAY OF NATRIURETIC PEPTIDE: CPT

## 2019-07-03 PROCEDURE — 87324 CLOSTRIDIUM AG IA: CPT

## 2019-07-03 PROCEDURE — 82550 ASSAY OF CK (CPK): CPT

## 2019-07-03 PROCEDURE — 96374 THER/PROPH/DIAG INJ IV PUSH: CPT

## 2019-07-03 PROCEDURE — 36415 COLL VENOUS BLD VENIPUNCTURE: CPT

## 2019-07-03 PROCEDURE — 94760 N-INVAS EAR/PLS OXIMETRY 1: CPT

## 2019-07-03 PROCEDURE — 99285 EMERGENCY DEPT VISIT HI MDM: CPT

## 2019-07-03 PROCEDURE — 80053 COMPREHEN METABOLIC PANEL: CPT

## 2019-07-03 PROCEDURE — 88108 CYTOPATH CONCENTRATE TECH: CPT

## 2019-07-03 PROCEDURE — 76604 US EXAM CHEST: CPT

## 2019-07-03 PROCEDURE — 87070 CULTURE OTHR SPECIMN AEROBIC: CPT

## 2019-07-03 PROCEDURE — 84157 ASSAY OF PROTEIN OTHER: CPT

## 2019-07-03 PROCEDURE — 88341 IMHCHEM/IMCYTCHM EA ADD ANTB: CPT

## 2019-07-03 PROCEDURE — 85025 COMPLETE CBC W/AUTO DIFF WBC: CPT

## 2019-07-03 PROCEDURE — 85730 THROMBOPLASTIN TIME PARTIAL: CPT

## 2019-07-03 PROCEDURE — 85610 PROTHROMBIN TIME: CPT

## 2019-07-03 PROCEDURE — 71045 X-RAY EXAM CHEST 1 VIEW: CPT

## 2019-07-03 PROCEDURE — 71046 X-RAY EXAM CHEST 2 VIEWS: CPT

## 2019-07-03 PROCEDURE — 84484 ASSAY OF TROPONIN QUANT: CPT

## 2019-07-03 PROCEDURE — 96375 TX/PRO/DX INJ NEW DRUG ADDON: CPT

## 2019-07-03 PROCEDURE — 89050 BODY FLUID CELL COUNT: CPT

## 2019-07-03 PROCEDURE — 83735 ASSAY OF MAGNESIUM: CPT

## 2019-07-03 PROCEDURE — 83615 LACTATE (LD) (LDH) ENZYME: CPT

## 2019-07-03 PROCEDURE — 88305 TISSUE EXAM BY PATHOLOGIST: CPT

## 2019-07-03 PROCEDURE — 93005 ELECTROCARDIOGRAM TRACING: CPT

## 2019-07-03 PROCEDURE — 82945 GLUCOSE OTHER FLUID: CPT

## 2019-07-03 PROCEDURE — 80048 BASIC METABOLIC PNL TOTAL CA: CPT

## 2019-07-03 PROCEDURE — 88342 IMHCHEM/IMCYTCHM 1ST ANTB: CPT

## 2019-07-03 RX ADMIN — IPRATROPIUM BROMIDE AND ALBUTEROL SULFATE SCH: .5; 3 SOLUTION RESPIRATORY (INHALATION) at 19:59

## 2019-07-03 RX ADMIN — ASPIRIN SCH: 325 TABLET ORAL at 17:46

## 2019-07-03 RX ADMIN — Medication SCH MG: at 21:01

## 2019-07-03 RX ADMIN — FUROSEMIDE SCH MG: 10 INJECTION, SOLUTION INTRAMUSCULAR; INTRAVENOUS at 23:19

## 2019-07-03 RX ADMIN — APIXABAN SCH: 5 TABLET, FILM COATED ORAL at 17:47

## 2019-07-03 RX ADMIN — VERAPAMIL HYDROCHLORIDE SCH MG: 80 TABLET ORAL at 21:01

## 2019-07-03 RX ADMIN — ASPIRIN SCH: 325 TABLET ORAL at 17:47

## 2019-07-03 RX ADMIN — METOPROLOL TARTRATE SCH: 25 TABLET, FILM COATED ORAL at 21:50

## 2019-07-03 NOTE — ED
SOB HPI





- General


Chief Complaint: Shortness of Breath


Stated Complaint: SOB


Time Seen by Provider: 19 12:50


Source: patient, family, RN notes reviewed


Mode of arrival: wheelchair


Limitations: no limitations





- History of Present Illness


Initial Comments: 





This is a 73-year-old female with a history of possible congestive heart failure

with his had recent treatment with Lasix who presents with complaints of 

exertional dyspnea for the past several weeks getting worse no fevers chills 

nausea vomiting sweats cough no chest pain no other symptoms.  No peripheral 

edema.  She states she was given a trial Lasix and if that fails she was 

afebrile lung drained.  No other modifying factors at this time


MD Complaint: shortness of breath





- Related Data


                                Home Medications











 Medication  Instructions  Recorded  Confirmed


 


Apixaban [Eliquis] 5 mg PO BID 03/15/19 07/03/19


 


Capecitabine [Xeloda] 1,500 mg PO AS DIRECTED 19


 


Capecitabine [Xeloda] 300 mg PO AS DIRECTED 19


 


Digoxin [Lanoxin] 125 mcg PO DAILY 19


 


Furosemide [Lasix] 20 mg PO DAILY PRN 19


 


Metoprolol Tartrate [Lopressor] 25 mg PO BID 19


 


Spironolactone 25 mg PO DAILY PRN 19


 


Verapamil [Isoptin] 80 mg PO BID 19








                                  Previous Rx's











 Medication  Instructions  Recorded


 


Magnesium Oxide [Mag-Ox] 250 mg PO TID #1 tablet 19











                                    Allergies











Allergy/AdvReac Type Severity Reaction Status Date / Time


 


hydromorphone [From Dilaudid] AdvReac  Hallucinati Verified 19 13:57





   ons  














Review of Systems


ROS Statement: 


Those systems with pertinent positive or pertinent negative responses have been 

documented in the HPI.





ROS Other: All systems not noted in ROS Statement are negative.





Past Medical History


Past Medical History: Atrial Fibrillation, Atrial Flutter, Cancer, 

Hyperlipidemia, Hypertension, Pulmonary Embolus (PE)


Additional Past Medical History / Comment(s): Metastatic ovarian cancer with 

hysterectomy/debulking and chemotherapy last dose 18, pancytopenia d/t 

chemo, afib/flutter with RVR in past, 2 pulmonary embolisms that pt believes 

were on the R side, 18 pt had acute CHF with pleural effusion and R sided t

horacentesis, diverticular disease.


History of Any Multi-Drug Resistant Organisms: None Reported


Past Surgical History: Appendectomy, Cholecystectomy, Hysterectomy, Pacemaker, 

Tonsillectomy, Tubal Ligation


Additional Past Surgical History / Comment(s): Debulking surgery approximately 

2016, cervical polypectomies, colonoscopies.  Pacemaker - 2019


Past Anesthesia/Blood Transfusion Reactions: Motion Sickness, Postoperative 

Nausea & Vomiting (PONV)


Additional Past Anesthesia/Blood Transfusion Reaction / Comment(s): Pt believes 

she recieved blood with her debulking surgery.


Type of Cardiac Device: Permanent Pacemaker


Device Placement Date:: 2019


Past Psychological History: No Psychological Hx Reported


Smoking Status: Never smoker


Past Alcohol Use History: Rare


Past Drug Use History: None Reported





- Past Family History


  ** Mother


Family Medical History: AFIB, Chest Pain / Angina, Diabetes Mellitus, 

Hypertension


Additional Family Medical History / Comment(s): Mother is 92 yrs old.





  ** Father


Family Medical History: Cancer


Additional Family Medical History / Comment(s): Father  of lung cancer at 

the age of 78yrs.





General Exam





- General Exam Comments


Initial Comments: 


Is a well-developed well-nourished awake alert oriented 3 female


Limitations: no limitations


General appearance: alert, in no apparent distress


Head exam: Present: atraumatic, normocephalic, normal inspection


Eye exam: Present: normal appearance, PERRL, EOMI.  Absent: scleral icterus, 

conjunctival injection, periorbital swelling


ENT exam: Present: normal exam, mucous membranes moist


Neck exam: Present: normal inspection.  Absent: tenderness, meningismus, 

lymphadenopathy


Respiratory exam: Present: decreased breath sounds, other (Marked decreased 

breath sounds the right side with dullness to percussion consistent with an 

effusion.).  Absent: respiratory distress, wheezes, rales, rhonchi, stridor


Cardiovascular Exam: Present: regular rate, normal rhythm, normal heart sounds. 

Absent: systolic murmur, diastolic murmur, rubs, gallop, clicks


GI/Abdominal exam: Present: soft, normal bowel sounds.  Absent: distended, 

tenderness, guarding, rebound, rigid


Extremities exam: Present: normal inspection, full ROM, normal capillary refill.

 Absent: tenderness, pedal edema, joint swelling, calf tenderness


Back exam: Present: normal inspection


Neurological exam: Present: alert, oriented X3, CN II-XII intact


Psychiatric exam: Present: normal affect, normal mood


Skin exam: Present: warm, dry, intact, normal color.  Absent: rash





Course


                                   Vital Signs











  19





  12:39 13:35 14:30


 


Temperature 97.3 F L  


 


Pulse Rate 59 L 49 L 52 L


 


Respiratory 18 16 16





Rate   


 


Blood Pressure 107/77  111/77


 


O2 Sat by Pulse 97 97 95





Oximetry   














  19





  15:31


 


Temperature 97.7 F


 


Pulse Rate 57 L


 


Respiratory 18





Rate 


 


Blood Pressure 116/66


 


O2 Sat by Pulse 98





Oximetry 














Medical Decision Making





- Medical Decision Making





I did discuss findings with the patient and her .  Patient be admitted 

for evaluation by cardiology and by pulmonary medicine I did discuss case with 

Dr. Madrigal.





- Lab Data


Result diagrams: 


                                 19 13:16





                                 19 14:38


                                   Lab Results











  19 Range/Units





  13:16 13:16 14:38 


 


WBC  6.3    (3.8-10.6)  k/uL


 


RBC  2.93 L    (3.80-5.40)  m/uL


 


Hgb  11.7    (11.4-16.0)  gm/dL


 


Hct  35.9    (34.0-46.0)  %


 


MCV  122.3 H    (80.0-100.0)  fL


 


MCH  40.0 H    (25.0-35.0)  pg


 


MCHC  32.7    (31.0-37.0)  g/dL


 


RDW  18.6 H    (11.5-15.5)  %


 


Plt Count  215    (150-450)  k/uL


 


Neutrophils %  83    %


 


Lymphocytes %  10    %


 


Monocytes %  4    %


 


Eosinophils %  2    %


 


Basophils %  1    %


 


Neutrophils #  5.2    (1.3-7.7)  k/uL


 


Lymphocytes #  0.6 L    (1.0-4.8)  k/uL


 


Monocytes #  0.2    (0-1.0)  k/uL


 


Eosinophils #  0.2    (0-0.7)  k/uL


 


Basophils #  0.0    (0-0.2)  k/uL


 


Manual Slide Review  Performed    


 


Poikilocytosis  Slight    


 


Anisocytosis  Slight    


 


Macrocytosis  Marked A    


 


PT    13.1 H  (9.0-12.0)  sec


 


INR    1.3 H  (<1.2)  


 


APTT    19.7 L  (22.0-30.0)  sec


 


Sodium     (137-145)  mmol/L


 


Potassium     (3.5-5.1)  mmol/L


 


Chloride     ()  mmol/L


 


Carbon Dioxide     (22-30)  mmol/L


 


Anion Gap     mmol/L


 


BUN     (7-17)  mg/dL


 


Creatinine     (0.52-1.04)  mg/dL


 


Est GFR (CKD-EPI)AfAm     (>60 ml/min/1.73 sqM)  


 


Est GFR (CKD-EPI)NonAf     (>60 ml/min/1.73 sqM)  


 


Glucose     (74-99)  mg/dL


 


Calcium     (8.4-10.2)  mg/dL


 


Magnesium     (1.6-2.3)  mg/dL


 


Total Bilirubin     (0.2-1.3)  mg/dL


 


AST     (14-36)  U/L


 


ALT     (9-52)  U/L


 


Alkaline Phosphatase     ()  U/L


 


Creatine Kinase     ()  U/L


 


Troponin I     (0.000-0.034)  ng/mL


 


NT-Pro-B Natriuret Pep   5120   pg/mL


 


Total Protein     (6.3-8.2)  g/dL


 


Albumin     (3.5-5.0)  g/dL














  19 Range/Units





  14:38 14:38 


 


WBC    (3.8-10.6)  k/uL


 


RBC    (3.80-5.40)  m/uL


 


Hgb    (11.4-16.0)  gm/dL


 


Hct    (34.0-46.0)  %


 


MCV    (80.0-100.0)  fL


 


MCH    (25.0-35.0)  pg


 


MCHC    (31.0-37.0)  g/dL


 


RDW    (11.5-15.5)  %


 


Plt Count    (150-450)  k/uL


 


Neutrophils %    %


 


Lymphocytes %    %


 


Monocytes %    %


 


Eosinophils %    %


 


Basophils %    %


 


Neutrophils #    (1.3-7.7)  k/uL


 


Lymphocytes #    (1.0-4.8)  k/uL


 


Monocytes #    (0-1.0)  k/uL


 


Eosinophils #    (0-0.7)  k/uL


 


Basophils #    (0-0.2)  k/uL


 


Manual Slide Review    


 


Poikilocytosis    


 


Anisocytosis    


 


Macrocytosis    


 


PT    (9.0-12.0)  sec


 


INR    (<1.2)  


 


APTT    (22.0-30.0)  sec


 


Sodium  140   (137-145)  mmol/L


 


Potassium  4.1   (3.5-5.1)  mmol/L


 


Chloride  98   ()  mmol/L


 


Carbon Dioxide  29   (22-30)  mmol/L


 


Anion Gap  13   mmol/L


 


BUN  31 H   (7-17)  mg/dL


 


Creatinine  1.52 H   (0.52-1.04)  mg/dL


 


Est GFR (CKD-EPI)AfAm  40   (>60 ml/min/1.73 sqM)  


 


Est GFR (CKD-EPI)NonAf  34   (>60 ml/min/1.73 sqM)  


 


Glucose  177 H   (74-99)  mg/dL


 


Calcium  9.2   (8.4-10.2)  mg/dL


 


Magnesium  1.3 L   (1.6-2.3)  mg/dL


 


Total Bilirubin  1.5 H   (0.2-1.3)  mg/dL


 


AST  37 H   (14-36)  U/L


 


ALT  16   (9-52)  U/L


 


Alkaline Phosphatase  131 H   ()  U/L


 


Creatine Kinase  38   ()  U/L


 


Troponin I   0.028  (0.000-0.034)  ng/mL


 


NT-Pro-B Natriuret Pep    pg/mL


 


Total Protein  6.6   (6.3-8.2)  g/dL


 


Albumin  3.8   (3.5-5.0)  g/dL














- EKG Data


-: EKG Interpreted by Me (EKG shows electronic pacemaker rhythm of 50  QT

since /439 )





- Radiology Data


Radiology results: report reviewed (I did review the imaging and report or is a 

right pleural effusion noted infiltrates not ruled out.), image reviewed





Disposition


Clinical Impression: 


 Congestive heart failure, Recurrent right pleural effusion





Disposition: ADMITTED AS IP TO THIS [facility]


Condition: Fair


Referrals: 


Hermelindo Garibay DO [Primary Care Provider] - 1-2 days

## 2019-07-03 NOTE — HP
HISTORY AND PHYSICAL



CHIEF COMPLAINTS:

Shortness of breath as well as diarrhea.



HISTORY OF PRESENT ILLNESS:

This 73-year-old woman with a past medical history of multiple medical problems,

including CHF, history of persistent atrial fibrillation, history of 
hypertension,

history of hyperlipidemia, history of metastatic ovarian cancer with debulking 
surgery

and chemotherapy, history of hypomagnesemia, being followed by Dr. Garibay in 
the

outpatient setting, was complaining of shortness of breath.  The patient had a 
recent

treatment with Lasix. Medication was adjusted in the outpatient setting.  The 
patient

also had right pleural effusion which was tapped multiple times. Because of 
increasing

symptoms, the patient came to [facility] and was admitted for further

evaluation and treatment. The previous 2D echo showed ejection fraction of 50% 
to 55%.

There is no history of any fever, rigor or chills. No history of headache, loss 
of

consciousness, seizures. The chest x-ray, which was evaluated personally by me, 
showed

CHF as well as right pleural effusion. The patient was also complaining of 
diarrhea

which is incessant for more than the last 2 years, which has worsened recently.



PAST MEDICAL HISTORY:

1. History of atrial fibrillation/flutter.

2. History of hypertension.

3. Hyperlipidemia.

4. History of pulmonary embolism.

5. Metastatic ovarian cancer.

6. Appendectomy.

7. Cholecystectomy.



HOME MEDICATIONS:

1. Isoptin 80 mg p.o. b.i.d.

2. Lopressor 25 mg p.o. b.i.d.

3. Lanoxin 125 mcg p.o. daily.

4. Xeloda 1500 mg p.o. before meals p.r.n.

5. Xeloda 300 mg p.r.n.

6. Aldactone 25 mg daily p.r.n.

7. Lasix 20 mg daily p.r.n.

8. Magnesium oxide 250 mg p.o. t.i.d.

9. Eliquis 5 mg p.o. b.i.d.



ALLERGIES:

DILAUDID.



FAMILY HISTORY:

History of atrial fibrillation, chest pain, diabetes, hypertension.



SOCIAL HISTORY:

No history of smoking.  No history of alcohol intake.



REVIEW OF SYSTEMS:

ENT: Diminished hearing. Diminished vision.

CARDIOVASCULAR SYSTEM: As mentioned earlier.

RESPIRATORY SYSTEM: As mentioned earlier.

GI: No nausea, vomiting.

: As mentioned earlier.

NERVOUS SYSTEM: No numbness, weakness.

ALLERGY/IMMUNOLOGY: No asthma, hayfever.

MUSCULOSKELETAL: As mentioned earlier.

HEMATOLOGY/ONCOLOGY: As mentioned earlier. ENDOCRINE: As mentioned earlier.

CONSTITUTIONAL: As mentioned earlier.

DERMATOLOGY: Negative.

RHEUMATOLOGY: Negative.

PSYCHIATRY: As mentioned earlier.



PHYSICAL EXAMINATION:

Patient is alert and oriented x3. Pulse 65, blood pressure 112/72, respiration 
17,

temperature 97.8, pulse ox 92% on room air.

HEENT: Conjunctivae normal. Oral mucosa moist.

NECK: No jugular venous distention. No carotid bruit. No lymph node enlargement.

CARDIOVASCULAR SYSTEM:  S1, S2 muffled, irregular. No S3. No S4.

RESPIRATORY SYSTEM: Breath sounds diminished at the bases.  A few scattered 
rhonchi and

crackles.  Breath sounds diminished on the right side.

ABDOMEN:  Soft, obese, nontender.  No mass palpable.

LEGS: No edema. No swelling.

NERVOUS SYSTEM: Higher functions as mentioned earlier. Moves all 4 limbs. No 
focal

motor or sensory deficit.

LYMPHATICS: No lymph node palpable in neck, axillae or groin.

SKIN: No ulcer, rash, bleeding.

JOINTS: No active deforming arthropathy.



LABS:

WBC 6.3, hemoglobin 11.7. MCV is 122.3.  Creatinine is 1.52. Glucose 177, 
magnesium

1.3. Total bilirubin 1.5, alkaline phosphatase 131.



ASSESSMENT:

1. Congestive heart failure, acute exacerbation, with acute on chronic diastolic

    dysfunction.

2. Right pleural effusion, recurrent.

3. Previous history of pleural effusion and thoracocentesis.

4. History of metastatic ovarian malignancy, status post debulking surgery and 
on

    chemotherapy.

5. Persistent atrial fibrillation.

6. History of sinus pause and tachy-tosha syndrome.

7. Hypertension.

8. Hyperlipidemia.

9. Obesity with body mass index 

10.History of pulmonary embolism.

11.History of pancytopenia.

12.History of diverticular disease.

13.History of cholecystectomy.

14.History of motion sickness.

15.FULL CODE.



RECOMMENDATIONS AND DISCUSSION:

In this 73-year-old woman who presented with multiple medical issues, we will 
monitor

the patient closely, continue the current management, continue with symptomatic

treatment. I recommend continuing with the IV Lasix.  I would also recommend a

cardiology consultation, pulmonary consultation with Dr. Pope.  Resume the 
home

medications. Guarded prognosis because of multiple complex medical issues. 
Further

recommendations to follow. Also recommend stool sample for C difficile colitis. 
If

stool for C difficile is negative, use Imodium on a p.r.n. basis.  Prognosis 
guarded

because of multiple complex medical issues, as mentioned earlier.  Further

recommendations to follow.  A copy of this dictation is being forwarded to Dr. Garibay,

who is following the patient in the outpatient setting.





MMODL / IJN: [id number] / Job#: [id number]

MTDD

## 2019-07-04 LAB
ANION GAP SERPL CALC-SCNC: 6 MMOL/L
BASOPHILS # BLD AUTO: 0 K/UL (ref 0–0.2)
BASOPHILS NFR BLD AUTO: 0 %
BUN SERPL-SCNC: 37 MG/DL (ref 7–17)
CALCIUM SPEC-MCNC: 8.5 MG/DL (ref 8.4–10.2)
CHLORIDE SERPL-SCNC: 98 MMOL/L (ref 98–107)
CO2 SERPL-SCNC: 34 MMOL/L (ref 22–30)
EOSINOPHIL # BLD AUTO: 0.2 K/UL (ref 0–0.7)
EOSINOPHIL NFR BLD AUTO: 3 %
ERYTHROCYTE [DISTWIDTH] IN BLOOD BY AUTOMATED COUNT: 2.37 M/UL (ref 3.8–5.4)
ERYTHROCYTE [DISTWIDTH] IN BLOOD: 17.9 % (ref 11.5–15.5)
GLUCOSE SERPL-MCNC: 98 MG/DL (ref 74–99)
HCT VFR BLD AUTO: 28.6 % (ref 34–46)
HGB BLD-MCNC: 9.4 GM/DL (ref 11.4–16)
LYMPHOCYTES # SPEC AUTO: 1.1 K/UL (ref 1–4.8)
LYMPHOCYTES NFR SPEC AUTO: 21 %
MCH RBC QN AUTO: 39.7 PG (ref 25–35)
MCHC RBC AUTO-ENTMCNC: 32.8 G/DL (ref 31–37)
MCV RBC AUTO: 121 FL (ref 80–100)
MONOCYTES # BLD AUTO: 0.3 K/UL (ref 0–1)
MONOCYTES NFR BLD AUTO: 5 %
NEUTROPHILS # BLD AUTO: 3.5 K/UL (ref 1.3–7.7)
NEUTROPHILS NFR BLD AUTO: 68 %
PLATELET # BLD AUTO: 153 K/UL (ref 150–450)
POTASSIUM SERPL-SCNC: 3.6 MMOL/L (ref 3.5–5.1)
SODIUM SERPL-SCNC: 138 MMOL/L (ref 137–145)
WBC # BLD AUTO: 5.1 K/UL (ref 3.8–10.6)

## 2019-07-04 RX ADMIN — VERAPAMIL HYDROCHLORIDE SCH: 80 TABLET ORAL at 20:27

## 2019-07-04 RX ADMIN — IPRATROPIUM BROMIDE AND ALBUTEROL SULFATE SCH: .5; 3 SOLUTION RESPIRATORY (INHALATION) at 20:09

## 2019-07-04 RX ADMIN — Medication SCH MG: at 17:13

## 2019-07-04 RX ADMIN — VERAPAMIL HYDROCHLORIDE SCH MG: 80 TABLET ORAL at 09:26

## 2019-07-04 RX ADMIN — ASPIRIN SCH: 325 TABLET ORAL at 19:04

## 2019-07-04 RX ADMIN — FUROSEMIDE SCH MG: 10 INJECTION, SOLUTION INTRAMUSCULAR; INTRAVENOUS at 20:32

## 2019-07-04 RX ADMIN — IPRATROPIUM BROMIDE AND ALBUTEROL SULFATE SCH: .5; 3 SOLUTION RESPIRATORY (INHALATION) at 08:38

## 2019-07-04 RX ADMIN — Medication SCH MG: at 09:25

## 2019-07-04 RX ADMIN — METOPROLOL TARTRATE SCH MG: 25 TABLET, FILM COATED ORAL at 20:31

## 2019-07-04 RX ADMIN — APIXABAN SCH: 5 TABLET, FILM COATED ORAL at 20:27

## 2019-07-04 RX ADMIN — VERAPAMIL HYDROCHLORIDE SCH: 80 TABLET ORAL at 09:30

## 2019-07-04 RX ADMIN — IPRATROPIUM BROMIDE AND ALBUTEROL SULFATE SCH: .5; 3 SOLUTION RESPIRATORY (INHALATION) at 11:28

## 2019-07-04 RX ADMIN — DIGOXIN SCH: 125 TABLET ORAL at 09:30

## 2019-07-04 RX ADMIN — APIXABAN SCH: 5 TABLET, FILM COATED ORAL at 09:27

## 2019-07-04 RX ADMIN — Medication SCH MG: at 20:31

## 2019-07-04 RX ADMIN — ASPIRIN SCH: 325 TABLET ORAL at 19:05

## 2019-07-04 RX ADMIN — FUROSEMIDE SCH MG: 10 INJECTION, SOLUTION INTRAMUSCULAR; INTRAVENOUS at 09:25

## 2019-07-04 RX ADMIN — IPRATROPIUM BROMIDE AND ALBUTEROL SULFATE SCH: .5; 3 SOLUTION RESPIRATORY (INHALATION) at 13:35

## 2019-07-04 RX ADMIN — METOPROLOL TARTRATE SCH MG: 25 TABLET, FILM COATED ORAL at 09:25

## 2019-07-04 NOTE — P.CRDCN
History of Present Illness


Consult date: 19


Reason for Consult (text): 


CHF





Chief complaint: progressively worsening shortness of breath


History of present illness: 


This is a pleasant 73-year-old patient who follows with Dr. BRYON Preston in the 

office.  She has a history of persistent atrial fibrillation, permanent 

pacemaker, hyperlipidemia, hypertension, metastatic ovarian cancer, history of 

PE, currently undergoing chemotherapy with Dr. Hillman with oral agents.  She's 

had a history of thoracentesis in the past once in 2018 and again in 

March of this year cytology of both show mesothelial cells, macrophages and 

mixed inflammatory cells, no cytologically malignant cells identified.  Most 

recent echocardiogram from January of this year showed low normal LV systolic 

function with ejection fraction between 50-55% with severe mitral regurgitation 

and moderate tricuspid regurgitation.  She presented to the emergency department

on this admission with complaints of progressively worsening shortness of breath

over the last couple of weeks.  No significant edema, no complaints of orthopnea

or PND.  She does complain of diarrhea that's worsened over the last couple of 

weeks.  She's had no chest discomfort, palpitations, but has had some 

lightheadedness with the shortness of breath.  Chest x-ray on admission showed 

significant right-sided pleural effusion.  Patient is being followed by Dr. Mcmillan who according to the patient is planning for thoracentesis tomorrow.  

Return values on admission showed white blood cell count of 6000, hemoglobin 1

1.7, normal electrolytes, BUN 31, creatinine 1.5 to, magnesium 1.3.  Troponins 

have been insignificant 3.  NT proBNP is elevated at 5120 with last NT proBNP 

of over 6000 in September. 








Past Medical History


Past Medical History: Atrial Fibrillation, Atrial Flutter, Cancer, 

Hyperlipidemia, Hypertension, Pulmonary Embolus (PE)


Additional Past Medical History / Comment(s): Metastatic ovarian cancer with 

hysterectomy/debulking and chemotherapy last dose 18, pancytopenia d/t 

chemo, afib/flutter with RVR in past, 2 pulmonary embolisms that pt believes 

were on the R side, 18 pt had acute CHF with pleural effusion and R sided 

thoracentesis, diverticular disease.


History of Any Multi-Drug Resistant Organisms: None Reported


Past Surgical History: Appendectomy, Cholecystectomy, Hysterectomy, Pacemaker, 

Tonsillectomy, Tubal Ligation


Additional Past Surgical History / Comment(s): Debulking surgery approximately 

, cervical polypectomies, colonoscopies.  Pacemaker - 2019


Past Anesthesia/Blood Transfusion Reactions: Motion Sickness, Postoperative 

Nausea & Vomiting (PONV)


Additional Past Anesthesia/Blood Transfusion Reaction / Comment(s): Pt believes 

she recieved blood with her debulking surgery.


Type of Cardiac Device: Permanent Pacemaker


Device Placement Date:: 2019


Past Psychological History: No Psychological Hx Reported


Additional Psychological History / Comment(s): Pt resides with her spouse.  She 

is independent.


Smoking Status: Never smoker


Past Alcohol Use History: Rare


Past Drug Use History: None Reported





- Past Family History


  ** Mother


Family Medical History: AFIB, Chest Pain / Angina, Diabetes Mellitus, 

Hypertension


Additional Family Medical History / Comment(s): Mother is 92 yrs old.





  ** Father


Family Medical History: Cancer


Additional Family Medical History / Comment(s): Father  of lung cancer at 

the age of 78yrs.





Medications and Allergies


                                Home Medications











 Medication  Instructions  Recorded  Confirmed  Type


 


Magnesium Oxide [Mag-Ox] 250 mg PO TID #1 tablet 19 Rx


 


Apixaban [Eliquis] 5 mg PO BID 03/15/19 07/03/19 History


 


Capecitabine [Xeloda] 1,500 mg PO AS DIRECTED 19 History


 


Capecitabine [Xeloda] 300 mg PO AS DIRECTED 19 History


 


Digoxin [Lanoxin] 125 mcg PO DAILY 19 History


 


Furosemide [Lasix] 20 mg PO DAILY PRN 19 History


 


Metoprolol Tartrate [Lopressor] 25 mg PO BID 19 History


 


Spironolactone 25 mg PO DAILY PRN 19 History


 


Verapamil [Isoptin] 80 mg PO BID 19 History








                                    Allergies











Allergy/AdvReac Type Severity Reaction Status Date / Time


 


hydromorphone [From Dilaudid] AdvReac  Hallucinati Verified 19 13:57





   ons  














Physical Exam


Vitals: 


                                   Vital Signs











  Temp Pulse Pulse Resp BP BP Pulse Ox


 


 19 08:00    56 L    


 


 19 07:00  97.5 F L   56 L  16   94/64  96


 


 19 01:55  97.6 F   49 L  18   93/59  99


 


 19 19:24  97.8 F   65  17   112/72  92 L


 


 19 17:24  98.2 F      


 


 19 17:15  97.7 F  52 L   18  112/62   98


 


 19 17:00   52 L   18  104/58   95


 


 19 15:31  97.7 F  57 L   18  116/66   98


 


 19 14:30   52 L   16  111/77   95


 


 19 13:35   49 L   16    97


 


 19 12:39  97.3 F L  59 L   18  107/77   97








                                Intake and Output











 19





 22:59 06:59 14:59


 


Intake Total   118


 


Balance   118


 


Intake:   


 


  Oral   118


 


Other:   


 


  # Voids 2 1 











PHYSICAL EXAMINATION: 





HEENT: Head is atraumatic, normocephalic.  Pupils equal, round.  Neck is supple.

 There is no elevated jugular venous pressure.





HEART EXAMINATION: Heart sounds regular, S1 and S2 with a holosystolic murmur at

the apex.





CHEST EXAMINATION: Lungs reveal diminished air entry to right lower lobe with 

crackles. No chest wall tenderness is noted on palpation or with deep breathing.





ABDOMEN:  Soft, nontender. Bowel sounds are heard. No organomegaly noted.


 


EXTREMITIES: 2+ peripheral pulses with no evidence of peripheral edema and no 

calf tenderness noted.





NEUROLOGIC patient is awake, alert and oriented x3.


 


.


 











Results





                                 19 05:25





                                 19 05:25


                                 Cardiac Enzymes











  19 Range/Units





  14:38 14:38 20:44 


 


AST  37 H    (14-36)  U/L


 


Troponin I   0.028  0.031  (0.000-0.034)  ng/mL














  19 Range/Units





  05:25 


 


AST   (14-36)  U/L


 


Troponin I  0.030  (0.000-0.034)  ng/mL








                                   Coagulation











  19 Range/Units





  14:38 


 


PT  13.1 H  (9.0-12.0)  sec


 


APTT  19.7 L  (22.0-30.0)  sec








                                       CBC











  19 Range/Units





  13:16 05:25 


 


WBC  6.3  5.1  (3.8-10.6)  k/uL


 


RBC  2.93 L  2.37 L  (3.80-5.40)  m/uL


 


Hgb  11.7  9.4 L D  (11.4-16.0)  gm/dL


 


Hct  35.9  28.6 L  (34.0-46.0)  %


 


Plt Count  215  153  (150-450)  k/uL








                          Comprehensive Metabolic Panel











  19 Range/Units





  14:38 05:25 


 


Sodium  140  138  (137-145)  mmol/L


 


Potassium  4.1  3.6  (3.5-5.1)  mmol/L


 


Chloride  98  98  ()  mmol/L


 


Carbon Dioxide  29  34 H  (22-30)  mmol/L


 


BUN  31 H  37 H  (7-17)  mg/dL


 


Creatinine  1.52 H  1.78 H  (0.52-1.04)  mg/dL


 


Glucose  177 H  98  (74-99)  mg/dL


 


Calcium  9.2  8.5  (8.4-10.2)  mg/dL


 


AST  37 H   (14-36)  U/L


 


ALT  16   (9-52)  U/L


 


Alkaline Phosphatase  131 H   ()  U/L


 


Total Protein  6.6   (6.3-8.2)  g/dL


 


Albumin  3.8   (3.5-5.0)  g/dL








                               Current Medications











Generic Name Dose Route Start Last Admin





  Trade Name Freq  PRN Reason Stop Dose Admin


 


Albuterol/Ipratropium  3 ml  19 20:00  19 11:28





  Duoneb 0.5 Mg-3 Mg/3 Ml Soln  INHALATION   Not Given





  RT-TID SEDRICK  


 


Albuterol/Ipratropium  3 ml  19 19:40 





  Duoneb 0.5 Mg-3 Mg/3 Ml Soln  INHALATION  





  RT-TID PRN  





  Shortness Of Breath Or Wheezing  


 


Apixaban  5 mg  19 21:00  19 09:27





  Eliquis  PO   Not Given





  BID [facility]  


 


Digoxin  125 mcg  19 09:00  19 09:30





  Lanoxin  PO   Not Given





  DAILY [facility]  


 


Furosemide  40 mg  19 21:00 





  Lasix  IV  





  Q12HR [facility]  


 


Magnesium Oxide  400 mg  19 22:00  19 09:25





  Mag-Ox  PO   400 mg





  TID SEDRICK   Administration


 


Metoprolol Tartrate  25 mg  19 21:00  19 09:25





  Lopressor  PO   25 mg





  BID SEDRICK   Administration


 


Patient's Own (  1,500 mg  19 16:45  19 17:46





Capecitabine [Xeloda  PO   Not Given





] 1,500 Mg)  AS DIRECTED SEDRICK  


 


Patient's Own (  300 mg  19 16:45  19 17:47





Capecitabine [Xeloda  PO   Not Given





] 300 Mg)  AS DIRECTED SEDRICK  


 


Spironolactone  25 mg  19 16:32 





  Aldactone  PO  





  DAILY PRN  





  Edema  


 


Verapamil HCl  80 mg  19 21:00  19 09:30





  Isoptin  PO   Not Given





  BID SEDRICK  








                                Intake and Output











 19





 22:59 06:59 14:59


 


Intake Total   118


 


Balance   118


 


Intake:   


 


  Oral   118


 


Other:   


 


  # Voids 2 1 








                                        





                                 19 05:25 





                                 19 05:25 











Assessment and Plan


Assessment: 


#1 acute on chronic diastolic congestive heart failure


#2 right sided pleural effusion


#3 severe mitral regurgitation


#4 persistent atrial fibrillation


#5 permanent pacemaker


#6 history of metastatic ovarian cancer








Plan: 


From cardiology's perspective, due to worsening renal function, we will decrease

Lasix to 40 mg IV every 12 hours.  We will monitor renal function and 

electrolytes.  We will continue to follow patient for a further recommendations 

accordingly.





NP note has been reviewed, I agree with a documented findings and plan of care. 

Patient was seen and examined.

## 2019-07-04 NOTE — US
EXAMINATION TYPE: US chest

 

DATE OF EXAM: 7/4/2019

 

COMPARISON: NONE

 

CLINICAL HISTORY: Markings for thoracentesis by pulmonary staff. Pleural effusion

 

TECHNIQUE:  Targeted ultrasound of the posterior lower bilateral hemithoraces

 

EXAM MEASUREMENTS:

 

Right Pleural Effusion pocket size:  Irregular shaped pocket with largest measurement 6.4cm and small
est measurement 1.6 cm

**  Right skin surface to fluid distance:  3.0 cm

Left Pleural Effusion pocket size:  0 cm

 

Right side MARKED for possible thoracentesis outside the dept.

 

Left side NOT MARKED for possible thoracentesis outside the dept.

 

Pulmonologists are able to review the images in the patient?s EMR.  

 

 

 

 

 

IMPRESSIONS: There is demonstration of a moderate right pleural effusion.

## 2019-07-04 NOTE — P.CNPUL
History of Present Illness


Consult date: 19


Reason for consult: pleural effusion


History of present illness: 





Courtney 3-year-old female patient with known history of metastatic ovarian cancer,

chronic right-sided pleural effusion, chronic atrial fibrillation and the 

patient is a permanent pacemaker in place and most recent echo cardiac exam 

showed a normal LV function with an ejection fraction of 50-55% along with 

severe MR and moderate tricuspid regurgitation, came into the hospital because 

of worsening shortness of breath.  ProBNP level was above 5000.  Troponins were 

negative.  Chest x-ray revealed volume loss along with some right lung opacity 

consistent with right-sided pleural effusion.  This is noted on previous 

evaluation.  The patient undergone 2 thoracenteses in the past the last one 

being in 2019 and the prior one was in 2018.  In both situations, the 

patient had a negative fluid cytology for malignancy.  I was asked to consider 

this patient for thoracentesis.  The patient has been taken Eliquis and long-

term articulation regarding her atrial fibrillation.  She is also taking Xeloda 

regarding her ovarian cancer.  She has nausea and she has diarrhea and dimi

nished appetite.  She has had previous history of pulmonary embolism.





Review of Systems








Constitutional: Reports fatigue, Reports weakness


Eyes: denies blurred vision, denies bulging eye, denies decreased vision


Ears: deny: decreased hearing, ear discharge, earache, tinnitus


Ears, nose, mouth and throat: Denies headache, Denies sore throat


Cardiovascular: Reports dyspnea on exertion, Reports shortness of breath


Respiratory: Reports dyspnea


Gastrointestinal: Denies abdominal pain, Denies diarrhea, Denies nausea, Denies 

vomiting


Genitourinary: Denies dysuria, Denies hematuria


Menstruation: Reports as per HPI


Musculoskeletal: Reports as per HPI


Musculoskeletal: absent: ankle pain, ankle stiffness, ankle swelling


Integumentary: Denies pruritus, Denies rash


Neurological: Denies numbness, Denies weakness


Psychiatric: Reports as per HPI


Endocrine: Reports as per HPI, Reports fatigue


Hematologic/Lymphatic: Reports as per HPI


Allergic/Immunologic: Reports as per HPI





Past Medical History


Past Medical History: Atrial Fibrillation, Atrial Flutter, Cancer, 

Hyperlipidemia, Hypertension, Pulmonary Embolus (PE)


Additional Past Medical History / Comment(s): Metastatic ovarian cancer with 

hysterectomy/debulking and chemotherapy last dose 18, pancytopenia d/t c

hemo, afib/flutter with RVR in past, 2 pulmonary embolisms that pt believes were

on the R side, 18 pt had acute CHF with pleural effusion and R sided 

thoracentesis, diverticular disease.  History of atrial fibrillation and the 

patient is a permanent pacemaker in place for now.


History of Any Multi-Drug Resistant Organisms: None Reported


Past Surgical History: Appendectomy, Cholecystectomy, Hysterectomy, Pacemaker, 

Tonsillectomy, Tubal Ligation


Additional Past Surgical History / Comment(s): Debulking surgery approximately 

, cervical polypectomies, colonoscopies.  Pacemaker - 2019


Past Anesthesia/Blood Transfusion Reactions: Motion Sickness, Postoperative 

Nausea & Vomiting (PONV)


Additional Past Anesthesia/Blood Transfusion Reaction / Comment(s): Pt believes 

she recieved blood with her debulking surgery.


Type of Cardiac Device: Permanent Pacemaker


Device Placement Date:: 2019


Past Psychological History: No Psychological Hx Reported


Additional Psychological History / Comment(s): Pt resides with her spouse.  She 

is independent.


Smoking Status: Never smoker


Past Alcohol Use History: Rare


Past Drug Use History: None Reported





- Past Family History


  ** Mother


Family Medical History: AFIB, Chest Pain / Angina, Diabetes Mellitus, 

Hypertension


Additional Family Medical History / Comment(s): Mother is 92 yrs old.





  ** Father


Family Medical History: Cancer


Additional Family Medical History / Comment(s): Father  of lung cancer at 

the age of 78yrs.





Medications and Allergies


                                Home Medications











 Medication  Instructions  Recorded  Confirmed  Type


 


Magnesium Oxide [Mag-Ox] 250 mg PO TID #1 tablet 19 Rx


 


Apixaban [Eliquis] 5 mg PO BID 03/15/19 07/03/19 History


 


Capecitabine [Xeloda] 1,500 mg PO AS DIRECTED 19 History


 


Capecitabine [Xeloda] 300 mg PO AS DIRECTED 19 History


 


Digoxin [Lanoxin] 125 mcg PO DAILY 19 History


 


Furosemide [Lasix] 20 mg PO DAILY PRN 19 History


 


Metoprolol Tartrate [Lopressor] 25 mg PO BID 19 History


 


Spironolactone 25 mg PO DAILY PRN 19 History


 


Verapamil [Isoptin] 80 mg PO BID 19 History








                                    Allergies











Allergy/AdvReac Type Severity Reaction Status Date / Time


 


hydromorphone [From Dilaudid] AdvReac  Hallucinati Verified 19 13:57





   ons  














Physical Exam


Vitals: 


                                   Vital Signs











  Temp Pulse Pulse Resp BP BP Pulse Ox


 


 19 08:00    56 L    


 


 19 07:00  97.5 F L   56 L  16   94/64  96


 


 19 01:55  97.6 F   49 L  18   93/59  99


 


 19 19:24  97.8 F   65  17   112/72  92 L


 


 19 17:24  98.2 F      


 


 19 17:15  97.7 F  52 L   18  112/62   98


 


 19 17:00   52 L   18  104/58   95


 


 19 15:31  97.7 F  57 L   18  116/66   98


 


 19 14:30   52 L   16  111/77   95








                                Intake and Output











 19





 22:59 06:59 14:59


 


Intake Total   354


 


Balance   354


 


Intake:   


 


  Oral   354


 


Other:   


 


  # Voids 2 1 3


 


  Weight   80.286 kg




















General appearance: alert, in no apparent distress


Head exam: NC/NT


Eye exam: normal appearance, PERRL


ENT exam: normal oropharynx, Oral thrush mild base of posterior tongue


Neck exam: Supple, Trachea Midline 


Respiratory exam: No increased respiratory effort normal lung sounds 

bilaterally, there is diminished breath on the right lung base along with 

dullness to percussion consistent with a right-sided pleural effusion.


Cardiovascular Exam:  tachycardia, irregular rhythm


GI/Abdominal exam:  soft.  Absent: tenderness


Extremities exam: pedal edema (+1 bilaterally).  Absent: calf tenderness


Neurological exam: non-focal alert


Psychiatric exam: normal affect, normal mood


Skin exam: yeast like rash under skin folds abdomen and breast, normal color








Results





- Laboratory Findings


CBC and BMP: 


                                 19 05:25





                                 19 05:25


PT/INR, D-dimer











PT  13.1 sec (9.0-12.0)  H  19  14:38    


 


INR  1.3  (<1.2)  H  19  14:38    








Abnormal lab findings: 


                                  Abnormal Labs











  19/19





  13:16 14:38 14:38


 


RBC  2.93 L  


 


Hgb   


 


Hct   


 


MCV  122.3 H  


 


MCH  40.0 H  


 


RDW  18.6 H  


 


Lymphocytes #  0.6 L  


 


Macrocytosis  Marked A  


 


PT   13.1 H 


 


INR   1.3 H 


 


APTT   19.7 L 


 


Carbon Dioxide   


 


BUN    31 H


 


Creatinine    1.52 H


 


Glucose    177 H


 


Magnesium    1.3 L


 


Total Bilirubin    1.5 H


 


AST    37 H


 


Alkaline Phosphatase    131 H














  19





  05:25 05:25


 


RBC  2.37 L 


 


Hgb  9.4 L D 


 


Hct  28.6 L 


 


MCV  121.0 H 


 


MCH  39.7 H 


 


RDW  17.9 H 


 


Lymphocytes #  


 


Macrocytosis  Marked A 


 


PT  


 


INR  


 


APTT  


 


Carbon Dioxide   34 H


 


BUN   37 H


 


Creatinine   1.78 H


 


Glucose  


 


Magnesium  


 


Total Bilirubin  


 


AST  


 


Alkaline Phosphatase  














- Diagnostic Findings


Chest x-ray: image reviewed





Assessment and Plan


Plan: 








Assessment





1 right-sided pleural effusion, recurrent and the patient has undergone 2 

previous thoracenteses in the past with negative fluid cytology for malignancy. 

Nevertheless, this does not rule out the possibility of malignant right-sided 

pleural effusion.  This problem has become ongoing and chronic.  He has also 

added to this patient's shortness of breath.





2 metastatic ovarian cancer post-systemic chemotherapy with Doxil and 

carboplatin and she is currently on oral Xeloda





3 illness of breath secondary to above





4 chronic atrial fibrillation, with a preserved LV function and the patient has 

been on long-term and to coagulation with Eliquis





5  history of pacemaker insertion





6 previous history of pulmonary embolism maintained on long-term and to 

coagulation with Eliquis





7 hypertension





8 hyperlipidemia





9 severe mitral regurgitation





10 acute kidney injury in the creatinine is up to 1.7





Plan





Keep the Eliquis on hold.  Some marking of the right chest.  Thoracentesis to 

follow for tomorrow.  Cardiology regarding the patient's cardiac problems 

including A. fib.  The patient is a preserved LV function.  We'll continue to 

follow.

## 2019-07-04 NOTE — PN
PROGRESS NOTE



DATE OF SERVICE:

07/04/2019.



This 73-year-old woman who was admitted with CHF acute exacerbation also had right

pleural effusion.  The patient is feeling slightly better.  Chest ultrasound has been

done.  Dr. Mcmillan is planning possibly thoracocenteses tomorrow.  No chest pain.  No

palpitations.  No fever.



PHYSICAL EXAM:

Alert and oriented x3.  The pulse is 44, blood pressure 97/55, respirations 16,

temperature 98.2, pulse ox 97% on room air.

HEENT: Conjunctivae normal.

NECK: No JVD.

CARDIOVASCULAR: S1, S2 muffled.

RESPIRATORY: Breath sounds diminished in the bases. Bilateral scattered rhonchi and

crackles. Breath sounds diminished especially in the right lower part.

ABDOMEN:  Soft.  Nontender.

NERVOUS SYSTEM: No focal deficits.



LABS:

WBC 5.9, hemoglobin 9.4, and creatinine is 1.78.



ASSESSMENT:

1. Congestive heart failure acute exacerbation with acute on chronic diastolic

    dysfunction.

2. Right pleural effusion, recurrent.

3. Previous history of pleural effusion with thoracocentesis with no evidence of

    malignancy.

4. History of metastatic ovarian malignancy status post debulking surgery,  is on

    chemotherapy.

5. Persistent atrial fibrillation.

6. Severe mitral regurgitation.

7. History of sinus pauses and tachy-tosha syndrome.

8. Increased creatinine with chronic kidney disease stage III.

9. Hypertension.

10.Hyperlipidemia.

11.Obesity with body mass index of 31.4.

12.History of pancytopenia.

13.History of pulmonary embolism.

14.History of diverticular disease.

15.History of cholecystectomy.

16.History of motion sickness.

17.FULL CODE.



RECOMMENDATIONS AND DISCUSSION:

Continue with the current medications, continue with monitoring, symptomatic treatment.

Otherwise, thoracocentesis.  I would cut down the dose of Lasix and closely monitor

with multiple consultants.  DVT prophylaxis.  Guarded prognosis.  Further

recommendations to follow.  See orders for details.  The patient is on apixaban.

Closely follow with Cardiology and pulmonology.





MMODL / IJN: [id number] / Job#: [id number]

## 2019-07-05 LAB
ANION GAP SERPL CALC-SCNC: 9 MMOL/L
BASOPHILS # BLD AUTO: 0 K/UL (ref 0–0.2)
BASOPHILS NFR BLD AUTO: 0 %
BUN SERPL-SCNC: 45 MG/DL (ref 7–17)
CALCIUM SPEC-MCNC: 8.5 MG/DL (ref 8.4–10.2)
CELL CNT PNL FLD: 100
CHLORIDE SERPL-SCNC: 98 MMOL/L (ref 98–107)
CO2 SERPL-SCNC: 31 MMOL/L (ref 22–30)
DEPRECATED POLYS # FLD: 24 %
EOSINOPHIL # BLD AUTO: 0.1 K/UL (ref 0–0.7)
EOSINOPHIL NFR BLD AUTO: 1 %
ERYTHROCYTE [DISTWIDTH] IN BLOOD BY AUTOMATED COUNT: 2.41 M/UL (ref 3.8–5.4)
ERYTHROCYTE [DISTWIDTH] IN BLOOD: 18.6 % (ref 11.5–15.5)
GLUCOSE SERPL-MCNC: 111 MG/DL (ref 74–99)
HCT VFR BLD AUTO: 29.3 % (ref 34–46)
HGB BLD-MCNC: 9.7 GM/DL (ref 11.4–16)
LYMPHOCYTES # SPEC AUTO: 0.7 K/UL (ref 1–4.8)
LYMPHOCYTES NFR SPEC AUTO: 13 %
MCH RBC QN AUTO: 40.2 PG (ref 25–35)
MCHC RBC AUTO-ENTMCNC: 33.1 G/DL (ref 31–37)
MCV RBC AUTO: 121.5 FL (ref 80–100)
MONOCYTES # BLD AUTO: 0.3 K/UL (ref 0–1)
MONOCYTES NFR BLD AUTO: 5 %
MONONUC CELLS # FLD: 74 %
NEUTROPHILS # BLD AUTO: 4.3 K/UL (ref 1.3–7.7)
NEUTROPHILS NFR BLD AUTO: 79 %
NUC CELL # FLD: 284 /UL
PLATELET # BLD AUTO: 150 K/UL (ref 150–450)
POTASSIUM SERPL-SCNC: 3.6 MMOL/L (ref 3.5–5.1)
PROT FLD-MCNC: 3400 MG/DL
SODIUM SERPL-SCNC: 138 MMOL/L (ref 137–145)
WBC # BLD AUTO: 5.4 K/UL (ref 3.8–10.6)

## 2019-07-05 PROCEDURE — 0W993ZX DRAINAGE OF RIGHT PLEURAL CAVITY, PERCUTANEOUS APPROACH, DIAGNOSTIC: ICD-10-PCS

## 2019-07-05 RX ADMIN — ASPIRIN SCH: 325 TABLET ORAL at 15:24

## 2019-07-05 RX ADMIN — METOPROLOL TARTRATE SCH: 25 TABLET, FILM COATED ORAL at 10:34

## 2019-07-05 RX ADMIN — DIGOXIN SCH MCG: 125 TABLET ORAL at 10:42

## 2019-07-05 RX ADMIN — METOPROLOL TARTRATE SCH: 25 TABLET, FILM COATED ORAL at 19:55

## 2019-07-05 RX ADMIN — APIXABAN SCH MG: 5 TABLET, FILM COATED ORAL at 21:20

## 2019-07-05 RX ADMIN — IPRATROPIUM BROMIDE AND ALBUTEROL SULFATE SCH: .5; 3 SOLUTION RESPIRATORY (INHALATION) at 11:57

## 2019-07-05 RX ADMIN — Medication SCH MG: at 21:20

## 2019-07-05 RX ADMIN — IPRATROPIUM BROMIDE AND ALBUTEROL SULFATE SCH: .5; 3 SOLUTION RESPIRATORY (INHALATION) at 19:15

## 2019-07-05 RX ADMIN — VERAPAMIL HYDROCHLORIDE SCH MG: 80 TABLET ORAL at 21:20

## 2019-07-05 RX ADMIN — LOPERAMIDE HYDROCHLORIDE PRN MG: 2 CAPSULE ORAL at 19:30

## 2019-07-05 RX ADMIN — Medication SCH MG: at 16:09

## 2019-07-05 RX ADMIN — LOPERAMIDE HYDROCHLORIDE PRN MG: 2 CAPSULE ORAL at 14:05

## 2019-07-05 RX ADMIN — FUROSEMIDE SCH MG: 10 INJECTION, SOLUTION INTRAMUSCULAR; INTRAVENOUS at 21:20

## 2019-07-05 RX ADMIN — FUROSEMIDE SCH MG: 10 INJECTION, SOLUTION INTRAMUSCULAR; INTRAVENOUS at 12:04

## 2019-07-05 RX ADMIN — Medication SCH MG: at 10:42

## 2019-07-05 RX ADMIN — VERAPAMIL HYDROCHLORIDE SCH: 80 TABLET ORAL at 10:34

## 2019-07-05 RX ADMIN — IPRATROPIUM BROMIDE AND ALBUTEROL SULFATE SCH: .5; 3 SOLUTION RESPIRATORY (INHALATION) at 08:33

## 2019-07-05 RX ADMIN — FUROSEMIDE SCH MG: 10 INJECTION, SOLUTION INTRAMUSCULAR; INTRAVENOUS at 10:42

## 2019-07-05 RX ADMIN — APIXABAN SCH: 5 TABLET, FILM COATED ORAL at 10:34

## 2019-07-05 NOTE — P.PN
Subjective


Progress Note Date: 07/05/19


Principal diagnosis: 


Right-sided pleural effusion, recurrent, metastatic ovarian cancer post systemic

chemotherapy





Mrs. Potts is a 73-year-old female patient with known history of metastatic 

ovarian cancer, chronic right-sided pleural effusion, chronic atrial 

fibrillation and the patient is a permanent pacemaker in place and most recent 

echo cardiac exam showed a normal LV function with an ejection fraction of 50-

55% along with severe MR and moderate tricuspid regurgitation, came into the 

hospital because of worsening shortness of breath.  ProBNP level was above 5000.

 Troponins were negative.  Chest x-ray revealed volume loss along with some 

right lung opacity consistent with right-sided pleural effusion.  This is noted 

on previous evaluation.  The patient undergone 2 thoracenteses in the past the 

last one being in March 2019 and the prior one was in 2018.  In both situations,

the patient had a negative fluid cytology for malignancy.  I was asked to 

consider this patient for thoracentesis.  The patient has been taken Eliquis and

long-term articulation regarding her atrial fibrillation.  She is also taking 

Xeloda regarding her ovarian cancer.  She has nausea and she has diarrhea and 

diminished appetite.  She has had previous history of pulmonary embolism.





On 07 and 5 2019 patient seen in follow-up on medical surgical floor.  She is on

room air, with pulse ox of 97%.  Patient is breathing easier today, feeling 

slightly better, no complaints of chest pain, no fever no palpitations.  Chest 

ultrasound has been completed showing right pleural effusion pocket of 6.4 cm, 

and left pleural effusion pocket showing no fluid.  Patient underwent right-

sided thoracentesis, and following the procedure chest x-ray was done showing 

interval reduction in amount of pleural fluid on the right with no evidence of 

pneumothorax.  Today's labs have been reviewed, showing white blood cell count 

of 5.4, hemoglobin of 9.7, platelet count is 150, electro lites were unremar

kable with the exception of CO2 which is at 31, BUN is 45 and creatinine is 

2.29.  C. diff test was negative.  Lasix has been discontinued per cardiology, 

related to worsening renal profile, and patient is on when necessary Imodium for

diarrhea. 








Objective





- Vital Signs


Vital signs: 


                                   Vital Signs











Temp  97.5 F L  07/05/19 14:14


 


Pulse  61   07/05/19 14:14


 


Resp  16   07/05/19 14:14


 


BP  93/62   07/05/19 14:14


 


Pulse Ox  97   07/05/19 14:14








                                 Intake & Output











 07/04/19 07/05/19 07/05/19





 18:59 06:59 18:59


 


Intake Total 354 540 420


 


Balance 354 540 420


 


Weight 80.286 kg 72.7 kg 


 


Intake:   


 


  Oral 354 540 420


 


Other:   


 


  Voiding Method   Toilet


 


  # Voids 3 2 3


 


  # Bowel Movements  2 














- Exam


 GENERAL EXAM: Alert, pleasant, 73-year-old white female, room air pulse ox is 

97% comfortable in no apparent distress.


HEAD: Normocephalic/atraumatic.


EYES: Normal reaction of pupils, equal size.  Conjunctiva pink, sclera white.


NOSE: Clear with pink turbinates.


THROAT: No erythema or exudates.


NECK: No masses, no JVD, no thyroid enlargement, no adenopathy.


CHEST: No chest wall deformity.  Symmetrical expansion. 


LUNGS: Equal air entry with no crackles, wheeze, rhonchi or dullness.


CVS: Regular rate and rhythm, normal S1 and S2, no gallops, no murmurs, no rubs


ABDOMEN: Soft, nontender.  No hepatosplenomegaly, normal bowel sounds, no 

guarding or rigidity.


EXTREMITIES: No clubbing, no edema, no cyanosis, 2+ pulses and upper and lower 

extremities.


MUSCULOSKELETAL: Muscle strength and tone normal.


SPINE: No scoliosis or deformity


SKIN: No rashes


CENTRAL NERVOUS SYSTEM: Alert and oriented -3.  No focal deficits, tone is 

normal in all 4 extremities.


PSYCHIATRIC: Alert and oriented -3.  Appropriate affect.  Intact judgment and 

insight.





 





- Labs


CBC & Chem 7: 


                                 07/05/19 07:08





                                 07/05/19 07:08


Labs: 


                  Abnormal Lab Results - Last 24 Hours (Table)











  07/05/19 07/05/19 Range/Units





  07:08 07:08 


 


RBC  2.41 L   (3.80-5.40)  m/uL


 


Hgb  9.7 L   (11.4-16.0)  gm/dL


 


Hct  29.3 L   (34.0-46.0)  %


 


MCV  121.5 H   (80.0-100.0)  fL


 


MCH  40.2 H   (25.0-35.0)  pg


 


RDW  18.6 H   (11.5-15.5)  %


 


Lymphocytes #  0.7 L   (1.0-4.8)  k/uL


 


Macrocytosis  Marked A   


 


Carbon Dioxide   31 H  (22-30)  mmol/L


 


BUN   45 H  (7-17)  mg/dL


 


Creatinine   2.29 H  (0.52-1.04)  mg/dL


 


Glucose   111 H  (74-99)  mg/dL














Assessment and Plan


Plan: 


 Assessment:





1 right-sided pleural effusion, recurrent and the patient has undergone 2 

previous thoracenteses in the past with negative fluid cytology for malignancy. 

Nevertheless, this does not rule out the possibility of malignant right-sided 

pleural effusion.  This problem has become ongoing and chronic.  He has also 

added to this patient's shortness of breath.





2 metastatic ovarian cancer post-systemic chemotherapy with Doxil and 

carboplatin and she is currently on oral Xeloda





3 illness of breath secondary to above





4 chronic atrial fibrillation, with a preserved LV function and the patient has 

been on long-term and to coagulation with Eliquis





5  history of pacemaker insertion





6 previous history of pulmonary embolism maintained on long-term and to 

coagulation with Eliquis





7 hypertension





8 hyperlipidemia





9 severe mitral regurgitation





10 acute kidney injury, related to ATN





Plan:





Patient has undergone a right-sided thoracentesis with removal of 450 she is a 

pleural fluid, tolerated procedure well, she is breathing easier, postprocedure 

chest x-ray has been reviewed showing interval decrease in the amount of pleural

effusion on the right, no evidence of pneumothorax.  Lasix has been 

discontinued, diarrhea is improved, C. diff was negative.  We'll continue to 

follow, will await the results of the pleural fluid analysis and cytology





I performed a history & physical examination of the patient and discussed their 

management with my nurse practitioner, Valentina Lutz.  I reviewed the nurse 

practitioner's note and agree with the documented findings and plan of care.  

Lung sounds are positive for decreased breath sounds on the right base.  The 

findings and the impression was discussed with the patient.  I attest to the 

documentation by the nurse practitioner. 





Time with Patient: Less than 30

## 2019-07-05 NOTE — XR
EXAMINATION TYPE: XR chest 1V portable

 

DATE OF EXAM: 7/5/2019

 

COMPARISON: 7/3/2019

 

HISTORY: Status post thoracentesis

 

TECHNIQUE: Single frontal view of the chest is obtained.

 

FINDINGS:  Small bilateral effusions and basilar consolidation are noted. No sizable pneumothorax. Ca
rdiac device seen and there is atherosclerotic changes aorta. Diffuse osteopenia with arthropathy of 
the shoulders. Degenerative change of the spine.

 

IMPRESSION:  Interval reduction in amount pleural fluid on the right with no evidence of pneumothorax
 postthoracentesis

## 2019-07-05 NOTE — PN
PROGRESS NOTE



DATE OF SERVICE:

07/05/2019



This 73-year-old woman who was admitted with CHF, acute exacerbation, also had a right

pleural effusion.  Dr. Mcmillan has evaluated the patient and performed a right-sided

thoracocentesis with removal of 450 mL of pleural fluid.  The patient is being closely

monitored.  No chest pain.  No palpitations.  No fever.



On exam, alert and oriented x3. Pulse 61, blood pressure 93/62, respirations 16,

temperature 97.3, pulse ox 97% on room air.

HEENT: Conjunctivae normal.

NECK: No jugular venous distention.

CARDIOVASCULAR SYSTEM:  S1, S2 muffled.

RESPIRATORY SYSTEM: Breath sounds diminished at the bases.  No rhonchi. No crackles.

ABDOMEN: Soft, non-tender.

LEGS: No edema. No swelling.

NERVOUS SYSTEM: No focal deficit.



LABS: WBC 5.4, hemoglobin 9.7, and creatinine is 2.29.



ASSESSMENT:

1. Congestive heart failure, acute exacerbation, with acute on chronic diastolic

    dysfunction.

2. Right pleural effusion, recurrent, status post right thoracocentesis of 450 mL of

    fluid.

3. Previous history of pleural effusion with thoracocentesis with no evidence of

    malignancy.

4. History of metastatic ovarian malignancy, status post debulking surgery; was on

    chemotherapy.

5. Persistent atrial fibrillation.

6. Severe mitral regurgitation.

7. History of sinus pause and tachy-tosha syndrome.

8. Increased creatinine with chronic kidney disease, stage III.

9. Hypertension.

10.Hyperlipidemia.

11.Obesity with body mass index of 31.4.

12.History of pancytopenia.

13.History of pulmonary embolism.

14.History of diverticular disease.

15.History of cholecystectomy.

16.History of motion sickness.

17.FULL CODE.



RECOMMENDATIONS AND DISCUSSION:

I recommend to continue current medications, continue with the monitoring, symptomatic

treatment. Otherwise I recommend following the patient closely. Continue with probably

a lesser dose of diuretics and monitor creatinine closely.  Monitor the rest of the

labs.  Guarded prognosis because of the multiple complex medical issues. Further

recommendations to follow.





MMODL / IJN: [id number] / Job#: [id number]

## 2019-07-06 LAB
ANION GAP SERPL CALC-SCNC: 11 MMOL/L
BASOPHILS # BLD AUTO: 0 K/UL (ref 0–0.2)
BASOPHILS NFR BLD AUTO: 1 %
BUN SERPL-SCNC: 49 MG/DL (ref 7–17)
CALCIUM SPEC-MCNC: 8.8 MG/DL (ref 8.4–10.2)
CHLORIDE SERPL-SCNC: 96 MMOL/L (ref 98–107)
CO2 SERPL-SCNC: 31 MMOL/L (ref 22–30)
EOSINOPHIL # BLD AUTO: 0.1 K/UL (ref 0–0.7)
EOSINOPHIL NFR BLD AUTO: 2 %
ERYTHROCYTE [DISTWIDTH] IN BLOOD BY AUTOMATED COUNT: 2.74 M/UL (ref 3.8–5.4)
ERYTHROCYTE [DISTWIDTH] IN BLOOD: 18.9 % (ref 11.5–15.5)
GLUCOSE SERPL-MCNC: 96 MG/DL (ref 74–99)
HCT VFR BLD AUTO: 33.5 % (ref 34–46)
HGB BLD-MCNC: 10.7 GM/DL (ref 11.4–16)
LYMPHOCYTES # SPEC AUTO: 1 K/UL (ref 1–4.8)
LYMPHOCYTES NFR SPEC AUTO: 20 %
MCH RBC QN AUTO: 39.2 PG (ref 25–35)
MCHC RBC AUTO-ENTMCNC: 32.1 G/DL (ref 31–37)
MCV RBC AUTO: 122.2 FL (ref 80–100)
MONOCYTES # BLD AUTO: 0.4 K/UL (ref 0–1)
MONOCYTES NFR BLD AUTO: 7 %
NEUTROPHILS # BLD AUTO: 3.5 K/UL (ref 1.3–7.7)
NEUTROPHILS NFR BLD AUTO: 69 %
PLATELET # BLD AUTO: 161 K/UL (ref 150–450)
POTASSIUM SERPL-SCNC: 3.7 MMOL/L (ref 3.5–5.1)
SODIUM SERPL-SCNC: 138 MMOL/L (ref 137–145)
WBC # BLD AUTO: 5.1 K/UL (ref 3.8–10.6)

## 2019-07-06 RX ADMIN — Medication SCH MG: at 15:30

## 2019-07-06 RX ADMIN — VERAPAMIL HYDROCHLORIDE SCH MG: 40 TABLET, FILM COATED ORAL at 20:15

## 2019-07-06 RX ADMIN — Medication SCH MG: at 07:26

## 2019-07-06 RX ADMIN — ASPIRIN SCH: 325 TABLET ORAL at 15:47

## 2019-07-06 RX ADMIN — LOPERAMIDE HYDROCHLORIDE PRN MG: 2 CAPSULE ORAL at 15:31

## 2019-07-06 RX ADMIN — FUROSEMIDE SCH MG: 10 INJECTION, SOLUTION INTRAMUSCULAR; INTRAVENOUS at 07:30

## 2019-07-06 RX ADMIN — DIGOXIN SCH MCG: 125 TABLET ORAL at 07:26

## 2019-07-06 RX ADMIN — APIXABAN SCH MG: 5 TABLET, FILM COATED ORAL at 20:15

## 2019-07-06 RX ADMIN — Medication SCH MG: at 20:16

## 2019-07-06 RX ADMIN — METOPROLOL TARTRATE SCH MG: 25 TABLET, FILM COATED ORAL at 15:30

## 2019-07-06 RX ADMIN — IPRATROPIUM BROMIDE AND ALBUTEROL SULFATE SCH: .5; 3 SOLUTION RESPIRATORY (INHALATION) at 12:07

## 2019-07-06 RX ADMIN — VERAPAMIL HYDROCHLORIDE SCH: 80 TABLET ORAL at 11:13

## 2019-07-06 RX ADMIN — METOPROLOL TARTRATE SCH: 25 TABLET, FILM COATED ORAL at 11:11

## 2019-07-06 RX ADMIN — ASPIRIN SCH: 325 TABLET ORAL at 15:46

## 2019-07-06 RX ADMIN — METOPROLOL TARTRATE SCH: 25 TABLET, FILM COATED ORAL at 23:22

## 2019-07-06 RX ADMIN — LOPERAMIDE HYDROCHLORIDE PRN MG: 2 CAPSULE ORAL at 07:29

## 2019-07-06 RX ADMIN — IPRATROPIUM BROMIDE AND ALBUTEROL SULFATE SCH: .5; 3 SOLUTION RESPIRATORY (INHALATION) at 20:32

## 2019-07-06 RX ADMIN — IPRATROPIUM BROMIDE AND ALBUTEROL SULFATE SCH: .5; 3 SOLUTION RESPIRATORY (INHALATION) at 08:12

## 2019-07-06 RX ADMIN — APIXABAN SCH MG: 5 TABLET, FILM COATED ORAL at 07:26

## 2019-07-06 NOTE — PCN
PROCEDURE NOTE



PREOP DIAGNOSIS:

Right side pleural effusion.



POSTOP DIAGNOSIS:

Right sided pleural effusion.



Indication:  Pleural effusion.



A time-out was completed verifying correct patient, procedure, site, positioning , and

implant (s) or special equipment if applicable.



Ultrasound guidance was used and appropriate fluid pocket was identified and marked.

Patient was positioned, prepped and draped in usual sterile fashion.  Lidocaine was

used to anesthetize the area.  A Thoracentesis catheter was introduced into the pleural

space and fluid was removed.  Blood loss was none.



A chest x-ray was ordered to evaluate for pneumothorax.



Total Fluid Removed: 450 mL

Color of Fluid: Dark turbid yellowish pleural effusion.

Fluid was sent for appropriate laboratory tests.



Patient tolerated the procedure well and there were no complications.



No bedside complications or bleeding.



Chest x-ray is to follow.





MMODL / IJN: [id number] / Job#: [id number]

## 2019-07-06 NOTE — PN
PROGRESS NOTE



Mrs. Potts is a 73-year-old female with a history of chronic persistent atrial

fibrillation, history of hyperlipidemia, hypertension, metastatic ovarian cancer, who

presented with symptoms of progressive dyspnea and was found to have evidence of

pleural effusion. She underwent thoracentesis yesterday.  She is feeling better today.

Her breathing is better.  She denies any symptoms of chest pain.  She has a known

history of mitral regurgitation and permanent pacemaker implantation.  She has a

preserved systolic function.  She is on digoxin 0.125 mg daily, Lasix changed to oral

20 mg twice a day today, metoprolol tartrate 25 mg twice a day, verapamil 80 mg twice a

day, spironolactone 25 mg daily in addition to Eliquis 5 mg twice a day.



PHYSICAL EXAMINATION:

VITAL SIGNS: Blood pressure running in the 90s with a heart rate in the 60s.

LUNGS: Mild decrease in breath sounds.

HEART: Irregularly irregular. S1, S2.  No S3.  No rub.

ABDOMEN:  Soft, nontender.

EXTREMITIES: Plus 1 edema.



LAB DATA:

Potassium of 3.7, BUN and creatinine of 49 and 2.08, hemoglobin of 10.7.



IMPRESSION:

1. Symptoms of progressive dyspnea with pleural effusion, status post thoracentesis.

2. Metastatic ovarian cancer.

3. Chronic persistent atrial fibrillation.

4. Permanent pacemaker implantation.

5. Hypertension.

6. Mitral regurgitation.



RECOMMENDATIONS:

I will decrease the dose of the verapamil and increase her beta blocker to maintain her

heart rate. Continue the rest of her medical regimen. Depending on her progress,

further recommendations will be made.





MMODL / IJN: [id number] / Job#: [id number]

## 2019-07-06 NOTE — P.PN
Subjective


Progress Note Date: 07/06/19


Principal diagnosis: 





Recurrent right-sided pleural effusion, metastatic ovarian cancer post-systemic 

chemotherapy





Mrs. Potts is a 73-year-old female patient with known history of metastatic 

ovarian cancer, chronic right-sided pleural effusion, chronic atrial 

fibrillation and the patient is a permanent pacemaker in place and most recent 

echo cardiac exam showed a normal LV function with an ejection fraction of 50-

55% along with severe MR and moderate tricuspid regurgitation, came into the 

hospital because of worsening shortness of breath.  ProBNP level was above 5000.

 Troponins were negative.  Chest x-ray revealed volume loss along with some 

right lung opacity consistent with right-sided pleural effusion.  This is noted 

on previous evaluation.  The patient undergone 2 thoracenteses in the past the 

last one being in March 2019 and the prior one was in 2018.  In both situations,

the patient had a negative fluid cytology for malignancy.  I was asked to 

consider this patient for thoracentesis.  The patient has been taken Eliquis and

long-term articulation regarding her atrial fibrillation.  She is also taking 

Xeloda regarding her ovarian cancer.  She has nausea and she has diarrhea and 

diminished appetite.  She has had previous history of pulmonary embolism.





The patient is seen today 07/06/2019 in follow-up on the regular medical floor. 

She is currently sitting up at the bedside.  Awake and alert in no acute 

distress.  Maintaining good O2 saturations in the 90s on room air.  She's been 

afebrile.  Hemodynamically stable.  She is status post right-sided thoracentesis

with 450 ML's of fluid removed.  The protein 3.4.  .  Cultures and 

pathology pending.  White count 5.1.  Hemoglobin 10.7.  Creatinine 2.08.





Objective





- Vital Signs


Vital signs: 


                                   Vital Signs











Temp  98.3 F   07/06/19 07:00


 


Pulse  65   07/06/19 07:00


 


Resp  14   07/06/19 07:00


 


BP  93/61   07/06/19 07:00


 


Pulse Ox  91 L  07/06/19 07:00








                                 Intake & Output











 07/05/19 07/06/19 07/06/19





 18:59 06:59 18:59


 


Intake Total 600 240 180


 


Balance 600 240 180


 


Weight  72.4 kg 


 


Intake:   


 


  Oral 600 240 180


 


Other:   


 


  Voiding Method Toilet Toilet Toilet


 


  # Voids 3 2 














- Exam





GENERAL EXAM: Alert, pleasant 73-year-old female, comfortable in no apparent 

distress.  On room air.


HEAD: Normocephalic.


EYES: Normal reaction of pupils, equal size.


NOSE: Clear with pink turbinates.


THROAT: No erythema or exudates.


NECK: No masses, no JVD.


CHEST: No chest wall deformity.


LUNGS: Equal air entry with faint crackles in posterior bases.


CVS: S1 and S2 normal with no audible murmur, regular rhythm.


ABDOMEN: No hepatosplenomegaly, normal bowel sounds, no guarding or rigidity.


SPINE: No scoliosis or deformity


SKIN: No rashes


CENTRAL NERVOUS SYSTEM: No focal deficits, tone is normal in all 4 extremities.


EXTREMITIES: There is no peripheral edema.  No clubbing, no cyanosis.  P

eripheral pulses are intact.





- Labs


CBC & Chem 7: 


                                 07/06/19 07:09





                                 07/06/19 07:09


Labs: 


                  Abnormal Lab Results - Last 24 Hours (Table)











  07/06/19 07/06/19 Range/Units





  07:09 07:09 


 


RBC  2.74 L   (3.80-5.40)  m/uL


 


Hgb  10.7 L   (11.4-16.0)  gm/dL


 


Hct  33.5 L   (34.0-46.0)  %


 


MCV  122.2 H   (80.0-100.0)  fL


 


MCH  39.2 H   (25.0-35.0)  pg


 


RDW  18.9 H   (11.5-15.5)  %


 


Macrocytosis  Marked A   


 


Chloride   96 L  ()  mmol/L


 


Carbon Dioxide   31 H  (22-30)  mmol/L


 


BUN   49 H  (7-17)  mg/dL


 


Creatinine   2.08 H  (0.52-1.04)  mg/dL








                      Microbiology - Last 24 Hours (Table)











 07/05/19 13:00 Gram Stain - Preliminary





 Thoracic Fluid Body Fluid Culture - Preliminary














Assessment and Plan


Assessment: 





 Assessment:





1 right-sided pleural effusion, recurrent and the patient has undergone 2 

previous thoracenteses in the past with negative fluid cytology for malignancy. 

Nevertheless, this does not rule out the possibility of malignant right-sided 

pleural effusion.  This problem has become ongoing and chronic.  Status post 

repeat right-sided thoracentesis with 450 MLS removed on 07/05/2019, pathology 

pending





2 metastatic ovarian cancer post-systemic chemotherapy with Doxil and 

carboplatin and she is currently on oral Xeloda





3 illness of breath secondary to above





4 chronic atrial fibrillation, with a preserved LV function and the patient has 

been on long-term and to coagulation with Eliquis





5  history of pacemaker insertion





6 previous history of pulmonary embolism maintained on long-term and to 

coagulation with Eliquis





7 hypertension





8 hyperlipidemia





9 severe mitral regurgitation





10 acute kidney injury, related to ATN





Plan:





The patient was seen and evaluated by Dr. Mcmillan.  She is currently stable 

from the pulmonary standpoint.  Pathology pending on the pleural fluid.  No 

pulmonary complaints.  Increase her activity as tolerated.  Discharge once 

cleared by medicine.





I, the cosigning physician, performed a history & physical examination of the 

patient. Lungs sounds with faint crackles in the posterior bases.  Maintaining 

good O2 saturations in the 90s on room air.  I discussed the assessment and plan

of care with my nurse practitioner, Janice Hagen. I attest to the above note as 

dictated by her.

## 2019-07-06 NOTE — PN
PROGRESS NOTE



DATE OF SERVICE:

07/06/2019.





This 73-year-old woman was admitted with CHF exacerbation, right pleural effusion.  Dr. Mcmillan performed thoracocentesis about 450 mL.  Patient being closely monitored.

Patient also had metastatic ovarian carcinoma.  There is no evidence of malignancy in

the fluid so far.  The patient's blood pressure is also running slightly low at 9156.

Cardiology evaluating the patient.  No chest pain.  No palpitations.  No fever.



EXAM:

Alert and oriented times three.  Pulse is 59.  Blood pressure 91/56, respiration 16,

temperature 97.4, pulse ox 94% on room air.

HEENT is conjunctivae normal.

NECK: No jugular venous distention.

CARDIOVASCULAR: S1, S2 muffled.  Ejection systolic murmur present.

RESPIRATION: Breath sounds diminished in the bases. A few scattered rhonchi and

crackles.

ABDOMEN is soft, nontender.

NERVOUS SYSTEM: No focal deficits.



LABS:

WBC 5.2, hemoglobin 10.7, platelets are 161, .2.  Creatinine is 2.08.



ASSESSMENT:

1. Congestive heart failure acute exacerbation with acute on chronic diastolic

    dysfunction.

2. Right pleural effusion, recurrent, status post right thoracocentesis 450 mL of

    fluid.

3. Previous history of pleural effusion, thoracocentesis, no evidence of malignancy.

4. History of metastatic ovarian malignancy, status post debulking surgery, was on

    chemotherapy.

5. Persistent atrial fibrillation.

6. Severe mitral regurgitation.

7. History of sinus pauses and tachy-tosha syndrome previously.

8. Increased creatinine with chronic kidney disease stage III.

9. Hypertension.

10.Hyperlipidemia.

11.Obesity with body mass index of 31.4.

12.History of pancytopenia.

13.History of pulmonary embolism.

14.History of diverticular disease.

15.History of cholecystectomy.

16.History of motion sickness.

17.FULL CODE.



DISCUSSION AND RECOMMENDATIONS:

Recommend to continue current medications, continue with monitoring and symptomatic

treatment.  At this time, I recommend to cut down the dose of Lasix and hold the

antihypertensive medications for blood pressure running less than 100.  Closely follow

with Cardiology.  Guarded prognosis.  Further recommendations to follow.





MMODL / IJN: [id number] / Job#: [id number]

## 2019-07-07 LAB
ANION GAP SERPL CALC-SCNC: 8 MMOL/L
BASOPHILS # BLD AUTO: 0 K/UL (ref 0–0.2)
BASOPHILS NFR BLD AUTO: 0 %
BUN SERPL-SCNC: 48 MG/DL (ref 7–17)
CALCIUM SPEC-MCNC: 8.5 MG/DL (ref 8.4–10.2)
CHLORIDE SERPL-SCNC: 95 MMOL/L (ref 98–107)
CO2 SERPL-SCNC: 35 MMOL/L (ref 22–30)
EOSINOPHIL # BLD AUTO: 0.1 K/UL (ref 0–0.7)
EOSINOPHIL NFR BLD AUTO: 2 %
ERYTHROCYTE [DISTWIDTH] IN BLOOD BY AUTOMATED COUNT: 2.53 M/UL (ref 3.8–5.4)
ERYTHROCYTE [DISTWIDTH] IN BLOOD: 18.7 % (ref 11.5–15.5)
GLUCOSE SERPL-MCNC: 100 MG/DL (ref 74–99)
HCT VFR BLD AUTO: 30.9 % (ref 34–46)
HGB BLD-MCNC: 10.1 GM/DL (ref 11.4–16)
LYMPHOCYTES # SPEC AUTO: 0.8 K/UL (ref 1–4.8)
LYMPHOCYTES NFR SPEC AUTO: 19 %
MCH RBC QN AUTO: 39.8 PG (ref 25–35)
MCHC RBC AUTO-ENTMCNC: 32.6 G/DL (ref 31–37)
MCV RBC AUTO: 122.1 FL (ref 80–100)
MONOCYTES # BLD AUTO: 0.3 K/UL (ref 0–1)
MONOCYTES NFR BLD AUTO: 7 %
NEUTROPHILS # BLD AUTO: 2.8 K/UL (ref 1.3–7.7)
NEUTROPHILS NFR BLD AUTO: 68 %
PLATELET # BLD AUTO: 143 K/UL (ref 150–450)
POTASSIUM SERPL-SCNC: 3.5 MMOL/L (ref 3.5–5.1)
SODIUM SERPL-SCNC: 138 MMOL/L (ref 137–145)
WBC # BLD AUTO: 4.1 K/UL (ref 3.8–10.6)

## 2019-07-07 RX ADMIN — IPRATROPIUM BROMIDE AND ALBUTEROL SULFATE SCH: .5; 3 SOLUTION RESPIRATORY (INHALATION) at 19:36

## 2019-07-07 RX ADMIN — Medication SCH MG: at 22:18

## 2019-07-07 RX ADMIN — DIGOXIN SCH MCG: 125 TABLET ORAL at 07:34

## 2019-07-07 RX ADMIN — APIXABAN SCH MG: 5 TABLET, FILM COATED ORAL at 22:18

## 2019-07-07 RX ADMIN — ASPIRIN SCH: 325 TABLET ORAL at 16:04

## 2019-07-07 RX ADMIN — METOPROLOL TARTRATE SCH MG: 25 TABLET, FILM COATED ORAL at 16:03

## 2019-07-07 RX ADMIN — METOPROLOL TARTRATE SCH: 25 TABLET, FILM COATED ORAL at 11:39

## 2019-07-07 RX ADMIN — LOPERAMIDE HYDROCHLORIDE PRN MG: 2 CAPSULE ORAL at 16:06

## 2019-07-07 RX ADMIN — METOPROLOL TARTRATE SCH MG: 25 TABLET, FILM COATED ORAL at 22:18

## 2019-07-07 RX ADMIN — LOPERAMIDE HYDROCHLORIDE PRN MG: 2 CAPSULE ORAL at 22:18

## 2019-07-07 RX ADMIN — IPRATROPIUM BROMIDE AND ALBUTEROL SULFATE SCH: .5; 3 SOLUTION RESPIRATORY (INHALATION) at 11:43

## 2019-07-07 RX ADMIN — FUROSEMIDE SCH: 20 TABLET ORAL at 07:35

## 2019-07-07 RX ADMIN — IPRATROPIUM BROMIDE AND ALBUTEROL SULFATE SCH: .5; 3 SOLUTION RESPIRATORY (INHALATION) at 08:01

## 2019-07-07 RX ADMIN — Medication SCH MG: at 07:34

## 2019-07-07 RX ADMIN — VERAPAMIL HYDROCHLORIDE SCH MG: 40 TABLET, FILM COATED ORAL at 07:34

## 2019-07-07 RX ADMIN — Medication SCH MG: at 16:03

## 2019-07-07 RX ADMIN — APIXABAN SCH MG: 5 TABLET, FILM COATED ORAL at 07:34

## 2019-07-07 RX ADMIN — LOPERAMIDE HYDROCHLORIDE PRN MG: 2 CAPSULE ORAL at 05:02

## 2019-07-07 NOTE — P.PN
Subjective


Progress Note Date: 07/07/19


Principal diagnosis: 


Right-sided pleural effusion, recurrent, metastatic ovarian cancer post systemic

chemotherapy





Mrs. Potts is a 73-year-old female patient with known history of metastatic 

ovarian cancer, chronic right-sided pleural effusion, chronic atrial 

fibrillation and the patient is a permanent pacemaker in place and most recent 

echo cardiac exam showed a normal LV function with an ejection fraction of 50-

55% along with severe MR and moderate tricuspid regurgitation, came into the 

hospital because of worsening shortness of breath.  ProBNP level was above 5000.

 Troponins were negative.  Chest x-ray revealed volume loss along with some 

right lung opacity consistent with right-sided pleural effusion.  This is noted 

on previous evaluation.  The patient undergone 2 thoracenteses in the past the 

last one being in March 2019 and the prior one was in 2018.  In both situations,

the patient had a negative fluid cytology for malignancy.  I was asked to 

consider this patient for thoracentesis.  The patient has been taken Eliquis and

long-term articulation regarding her atrial fibrillation.  She is also taking 

Xeloda regarding her ovarian cancer.  She has nausea and she has diarrhea and 

diminished appetite.  She has had previous history of pulmonary embolism.





On 07 and 5 2019 patient seen in follow-up on medical surgical floor.  She is on

room air, with pulse ox of 97%.  Patient is breathing easier today, feeling 

slightly better, no complaints of chest pain, no fever no palpitations.  Chest 

ultrasound has been completed showing right pleural effusion pocket of 6.4 cm, 

and left pleural effusion pocket showing no fluid.  Patient underwent right-

sided thoracentesis, and following the procedure chest x-ray was done showing 

interval reduction in amount of pleural fluid on the right with no evidence of 

pneumothorax.  Today's labs have been reviewed, showing white blood cell count 

of 5.4, hemoglobin of 9.7, platelet count is 150, electro lites were unremar

kable with the exception of CO2 which is at 31, BUN is 45 and creatinine is 

2.29.  C. diff test was negative.  Lasix has been discontinued per cardiology, 

related to worsening renal profile, and patient is on when necessary Imodium for

diarrhea. 





On 07/07/2019 she is seen in follow-up on medical surgical floor.  She is 

sitting up on the edge of the bed, she is in no acute distress.  Patient is st

atus post right-sided thoracentesis, pleural fluid analysis revealed 

transudative fluid, pleural fluid cytology is still pending.  Patient is 

breathing easier, she is on room air, pulse ox is 92%, no couplets or chest 

pain, no cough, congestion, lung sounds reveal diminished breath sounds over 

right lower lobe, clear on the left.  Continues on oral diuretics, currently 

down to 20 mg daily, she is on nebulized bronchodilators, no acute events 

overnight, she is on oral anticoagulation for chronic A. fib with a controlled 

rate.








Objective





- Vital Signs


Vital signs: 


                                   Vital Signs











Temp  97.5 F L  07/07/19 07:00


 


Pulse  68   07/07/19 07:00


 


Resp  16   07/07/19 07:35


 


BP  93/61   07/07/19 07:00


 


Pulse Ox  92 L  07/07/19 07:00








                                 Intake & Output











 07/06/19 07/07/19 07/07/19





 18:59 06:59 18:59


 


Intake Total 660  361


 


Balance 660  361


 


Weight  72.7 kg 


 


Intake:   


 


  Oral 660  361


 


Other:   


 


  Voiding Method Toilet Toilet Toilet


 


  # Voids 2 1 














- Exam


 GENERAL EXAM: Alert, pleasant, 73-year-old white female, room air pulse ox is 

97% comfortable in no apparent distress.


HEAD: Normocephalic/atraumatic.


EYES: Normal reaction of pupils, equal size.  Conjunctiva pink, sclera white.


NOSE: Clear with pink turbinates.


THROAT: No erythema or exudates.


NECK: No masses, no JVD, no thyroid enlargement, no adenopathy.


CHEST: No chest wall deformity.  Symmetrical expansion. 


LUNGS: Equal air entry with no crackles, wheeze, rhonchi or dullness.


CVS: Regular rate and rhythm, normal S1 and S2, no gallops, no murmurs, no rubs


ABDOMEN: Soft, nontender.  No hepatosplenomegaly, normal bowel sounds, no 

guarding or rigidity.


EXTREMITIES: No clubbing, no edema, no cyanosis, 2+ pulses and upper and lower 

extremities.


MUSCULOSKELETAL: Muscle strength and tone normal.


SPINE: No scoliosis or deformity


SKIN: No rashes


CENTRAL NERVOUS SYSTEM: Alert and oriented -3.  No focal deficits, tone is 

normal in all 4 extremities.


PSYCHIATRIC: Alert and oriented -3.  Appropriate affect.  Intact judgment and 

insight.





 





- Labs


CBC & Chem 7: 


                                 07/07/19 05:32





                                 07/07/19 05:32


Labs: 


                  Abnormal Lab Results - Last 24 Hours (Table)











  07/07/19 07/07/19 Range/Units





  05:32 05:32 


 


RBC  2.53 L   (3.80-5.40)  m/uL


 


Hgb  10.1 L   (11.4-16.0)  gm/dL


 


Hct  30.9 L   (34.0-46.0)  %


 


MCV  122.1 H   (80.0-100.0)  fL


 


MCH  39.8 H   (25.0-35.0)  pg


 


RDW  18.7 H   (11.5-15.5)  %


 


Plt Count  143 L   (150-450)  k/uL


 


Lymphocytes #  0.8 L   (1.0-4.8)  k/uL


 


Macrocytosis  Marked A   


 


Chloride   95 L  ()  mmol/L


 


Carbon Dioxide   35 H  (22-30)  mmol/L


 


BUN   48 H  (7-17)  mg/dL


 


Creatinine   1.73 H  (0.52-1.04)  mg/dL


 


Glucose   100 H  (74-99)  mg/dL








                      Microbiology - Last 24 Hours (Table)











 07/05/19 13:00 Gram Stain - Preliminary





 Thoracic Fluid Body Fluid Culture - Preliminary














Assessment and Plan


Plan: 


 Assessment:





1 right-sided pleural effusion, recurrent and the patient has undergone 2 

previous thoracenteses in the past with negative fluid cytology for malignancy. 

Nevertheless, this does not rule out the possibility of malignant right-sided 

pleural effusion.  This problem has become ongoing and chronic.  He has also 

added to this patient's shortness of breath.  Status post right-sided 

thoracentesis with removal of 450 she is a pleural fluid, tolerated procedure 

well, she is breathing easier, postprocedure chest x-ray has been reviewed 

showing interval decrease in the amount of pleural effusion on the right, no 

evidence of pneumothorax.  Lasix has been discontinued, diarrhea is improved, C.

diff was negative.  We'll continue to follow, will await the results of the 

pleural fluid analysis and cytology





2 metastatic ovarian cancer post-systemic chemotherapy with Doxil and 

carboplatin and she is currently on oral Xeloda





3 illness of breath secondary to above





4 chronic atrial fibrillation, with a preserved LV function and the patient has 

been on long-term and to coagulation with Eliquis





5  history of pacemaker insertion





6 previous history of pulmonary embolism maintained on long-term and to 

coagulation with Eliquis





7 hypertension





8 hyperlipidemia





9 severe mitral regurgitation





10 acute kidney injury, related to ATN





Plan:





Pleural fluid analysis reveals transudative of fluid, cytology is pending, 

pleural fluid cultures showed no growth so far.  Clinical patient is stable, no 

complaints of chest pain, no difficulty breathing.  She is on room air, 

tolerating ambulation.  A. fib was controlled, she is on oral anticoagulation, 

patient follows with Dr. Jackson in the pulmonary clinic, from pulmonary 

perspective she can be discharged, and follow-up with Dr. Jackson for results of 

the cytology.





I performed a history & physical examination of the patient and discussed their 

management with my nurse practitioner, Valentina Lutz.  I reviewed the nurse 

practitioner's note and agree with the documented findings and plan of care.  

Lung sounds are positive for decreased breath sounds on the right base.  The 

findings and the impression was discussed with the patient.  I attest to the 

documentation by the nurse practitioner. 





Time with Patient: Less than 30

## 2019-07-07 NOTE — PN
PROGRESS NOTE



DATE OF SERVICE:

07/07/2019



This 73-year-old woman was admitted with multiple medical problems, also had a pleural

tap for recurrent pleural effusion.  The patient also had atrial fibrillation.  The

patient also had relative hypotension.  Cardiology is following the patient closely.

No chest pain.  No palpitations.  No fever.  Activity is being increased.



EXAM:

Alert and oriented x3.  Pulse 57, blood pressure 92/49, respirations 16, temperature

97.9, pulse ox 98% on room air.

HEENT: Conjunctivae normal.

NECK: No JVD.

CARDIOVASCULAR: S1, S2 muffled.

RESPIRATION: Breath sounds diminished in the bases.  Scattered rhonchi.

ABDOMEN soft.

NERVOUS SYSTEM: No focal deficits.



LABS:

WBC 4.2, hemoglobin 10.1, creatinine is 1.73.



ASSESSMENT:

1. Congestive heart failure exacerbation with acute on chronic diastolic dysfunction.

2. Right pleural effusion, recurrent, status post thoracocentesis about 450 mL of

    fluid.

3. Previous history of pleural effusion, thoracocentesis, no evidence of malignancy.

4. History of metastatic ovarian malignancy status post debulking surgery was on

    chemotherapy.

5. Persistent atrial fibrillation.

6. History of severe mitral regurgitation.

7. History of sinus pauses and tachybrady syndrome previously.

8. Increased creatinine with chronic kidney stage III.

9. Hypertension.

10.Hyperlipidemia.

11.Obesity with body mass index of 31.4.

12.History of pancytopenia.

13.History of pulmonary embolism.

14.History of diverticular disease.

15.History of cholecystectomy.

16.History of motion sickness.

17.FULL CODE.



RECOMMENDATIONS AND DISCUSSION:

I recommend to continue current medications, continue with monitoring and symptomatic

treatment. Otherwise, at this time, I recommend monitor the blood pressure closely.

Adjust medications further per Cardiology.  Increase ambulation.  Further

recommendations to follow.





MMODL / IJN: [id number] / Job#: [id number]

## 2019-07-07 NOTE — PN
PROGRESS NOTE



Mrs Potts is a 73-year-old female who has a history of chronic persistent atrial

fibrillation, history of permanent pacemaker implantation, hypertension,

hyperlipidemia, metastatic ovarian cancer who has underwent thoracentesis because of

recurrent pleural effusion.  She is feeling well this morning.  Her breathing is

stable.  She denies any chest pain.  No dizziness.  No palpitation.  She denies any

nausea.  Hemodynamically, her blood pressure remains on the lower side.  Her heart rate

is stable.  She denies any dizziness or palpitation.  She denies any nausea.  She

continues to be on verapamil 40 mg twice a day, metoprolol tartrate 25 mg 3 times a

day, furosemide 20 mg daily, digoxin 0.125 mg daily, and Eliquis 5 mg twice a day.



PHYSICAL EXAMINATION:

Blood pressure running in the 90s with a heart rate in the 60.  Lungs with mild

decrease in breath sounds at the right base.  Heart irregularly, irregular. S1, S2.  No

S3.  No rub.

ABDOMEN:  Soft, nontender.

EXTREMITIES no significant edema.



LAB DATA:

Lab data revealed a hemoglobin of 10.1, BUN and creatinine of 48 and 1.73, improved

compared to yesterday.



IMPRESSION:

1. Pleural effusion status post thoracentesis.  The etiology of her recurrent pleural

    effusion is unclear.

2. Atrial fibrillation, with controlled ventricular response anticoagulated.

3. Status post permanent pacemaker implantation.

4. History of mitral regurgitation.

5. Metastatic ovarian cancer.

6. Hypertension.



RECOMMENDATIONS:

I will stop her verapamil at this time.  Follow her blood pressure.  Increase her level

of activity and depending on her progress, further recommendations will be made.





MMODL / IJN: [id number] / Job#: [id number]

## 2019-07-08 LAB
ANION GAP SERPL CALC-SCNC: 6 MMOL/L
BASOPHILS # BLD AUTO: 0 K/UL (ref 0–0.2)
BASOPHILS NFR BLD AUTO: 0 %
BUN SERPL-SCNC: 48 MG/DL (ref 7–17)
CALCIUM SPEC-MCNC: 8.8 MG/DL (ref 8.4–10.2)
CHLORIDE SERPL-SCNC: 95 MMOL/L (ref 98–107)
CO2 SERPL-SCNC: 36 MMOL/L (ref 22–30)
EOSINOPHIL # BLD AUTO: 0.1 K/UL (ref 0–0.7)
EOSINOPHIL NFR BLD AUTO: 2 %
ERYTHROCYTE [DISTWIDTH] IN BLOOD BY AUTOMATED COUNT: 2.61 M/UL (ref 3.8–5.4)
ERYTHROCYTE [DISTWIDTH] IN BLOOD: 18.8 % (ref 11.5–15.5)
GLUCOSE SERPL-MCNC: 110 MG/DL (ref 74–99)
HCT VFR BLD AUTO: 31.9 % (ref 34–46)
HGB BLD-MCNC: 10.5 GM/DL (ref 11.4–16)
LYMPHOCYTES # SPEC AUTO: 0.7 K/UL (ref 1–4.8)
LYMPHOCYTES NFR SPEC AUTO: 19 %
MCH RBC QN AUTO: 40.3 PG (ref 25–35)
MCHC RBC AUTO-ENTMCNC: 33 G/DL (ref 31–37)
MCV RBC AUTO: 122.2 FL (ref 80–100)
MONOCYTES # BLD AUTO: 0.3 K/UL (ref 0–1)
MONOCYTES NFR BLD AUTO: 8 %
NEUTROPHILS # BLD AUTO: 2.6 K/UL (ref 1.3–7.7)
NEUTROPHILS NFR BLD AUTO: 68 %
PLATELET # BLD AUTO: 137 K/UL (ref 150–450)
POTASSIUM SERPL-SCNC: 3.9 MMOL/L (ref 3.5–5.1)
SODIUM SERPL-SCNC: 137 MMOL/L (ref 137–145)
WBC # BLD AUTO: 3.9 K/UL (ref 3.8–10.6)

## 2019-07-08 RX ADMIN — LOPERAMIDE HYDROCHLORIDE PRN MG: 2 CAPSULE ORAL at 21:00

## 2019-07-08 RX ADMIN — Medication SCH MG: at 20:52

## 2019-07-08 RX ADMIN — APIXABAN SCH MG: 5 TABLET, FILM COATED ORAL at 10:11

## 2019-07-08 RX ADMIN — Medication SCH MG: at 10:11

## 2019-07-08 RX ADMIN — DIGOXIN SCH MCG: 125 TABLET ORAL at 10:11

## 2019-07-08 RX ADMIN — Medication SCH MG: at 16:33

## 2019-07-08 RX ADMIN — FUROSEMIDE SCH MG: 20 TABLET ORAL at 10:11

## 2019-07-08 RX ADMIN — METOPROLOL TARTRATE SCH: 25 TABLET, FILM COATED ORAL at 20:52

## 2019-07-08 RX ADMIN — LOPERAMIDE HYDROCHLORIDE PRN MG: 2 CAPSULE ORAL at 13:35

## 2019-07-08 RX ADMIN — METOPROLOL TARTRATE SCH MG: 25 TABLET, FILM COATED ORAL at 10:11

## 2019-07-08 RX ADMIN — SPIRONOLACTONE SCH MG: 25 TABLET, FILM COATED ORAL at 13:31

## 2019-07-08 RX ADMIN — ASPIRIN SCH: 325 TABLET ORAL at 17:51

## 2019-07-08 RX ADMIN — IPRATROPIUM BROMIDE AND ALBUTEROL SULFATE SCH: .5; 3 SOLUTION RESPIRATORY (INHALATION) at 11:26

## 2019-07-08 RX ADMIN — IPRATROPIUM BROMIDE AND ALBUTEROL SULFATE SCH: .5; 3 SOLUTION RESPIRATORY (INHALATION) at 08:23

## 2019-07-08 RX ADMIN — METOPROLOL TARTRATE SCH MG: 25 TABLET, FILM COATED ORAL at 16:33

## 2019-07-08 RX ADMIN — APIXABAN SCH MG: 5 TABLET, FILM COATED ORAL at 20:53

## 2019-07-08 RX ADMIN — IPRATROPIUM BROMIDE AND ALBUTEROL SULFATE SCH: .5; 3 SOLUTION RESPIRATORY (INHALATION) at 20:10

## 2019-07-08 NOTE — P.PN
Progress Note - Text


Progress Note Date: 07/08/19





Presenting complaint:


Short of breath





Interval history:


This pleasant lady with metastatic ovarian cancer, status post chemotherapy.  On

Xeloda.  Admitted with recurrent right pleural effusion.  Short of breath.  

Status post right-sided thoracentesis, 450 mL were removed.  Also has underlying

chronic A. fib.


Today-sitting up.  Feels better.  Breathing is improved.  Blood pressures 

running low.  Appetite okay.  Did have a bowel movement.  Verapamil had been 

held because of low blood pressure.





Review of systems: Was done for constitutional, cardiovascular, GI, pulmonary. 

relevant finding as above





Current medications are reviewed that include:


DuoNeb, eliquis, by mouth Lasix, Xeloda, Lopressor





On examination:


VITAL SIGNS: 97.9, 55, 18, 106/57, 96% room air


GENERAL APPEARANCE: Sitting up in bed not in distress. 


HEENT: Normal external appearance of nose and ear.  Oral cavity normal


EYES: Pupils equal. Conjunctiva normal. 


NECK: JVD not raised. Mass not palpable. 


RESPIRATORY: Respiratory effort normal.  Decreased breath sounds on the right 

side posteriorly. 


CARDIOVASCULAR: Irregular heart sounds. No edema. 


ABDOMEN: Soft. Liver and spleen not palpable. No tenderness. No mass palpable. 


PSYCHIATRY: Alert and oriented x3. Mood and affect normal. 





Investigations, reviewed in the clinical context:


Potassium 3.9, white count 3.9, hemoglobin 10.5


Blood urea nitrogen 48, creatinine 1.19





Assessment:


-Recurrent right pleural effusion, symptomatic in a patient with metastatic 

ovarian cancer, cytology being negative but strongly suspicious for the same


-Persistent atrial flutter fibrillation, with permanent pacemaker


-Chronic congestive heart failure from diastolic dysfunction EF 50-55%


-Essential hypertension


-Hyperlipidemia


-Metastatic ovarian cancer status post debulking surgery and chemotherap





Plan:


Patient blood pressure was running on the lower side.  Verapamil had been held. 

Patient is on Lopressor.  Did discuss with the patient.  Plan the patient ablate

today.  See how she does.  Depending on the blood pressure and the heart rate 

verapamil could be resumed tomorrow.  Hoping she can then be discharged.  

Questions were answered.

## 2019-07-09 VITALS — HEART RATE: 57 BPM | DIASTOLIC BLOOD PRESSURE: 51 MMHG | SYSTOLIC BLOOD PRESSURE: 83 MMHG

## 2019-07-09 VITALS — RESPIRATION RATE: 16 BRPM | TEMPERATURE: 97.5 F

## 2019-07-09 LAB
ANION GAP SERPL CALC-SCNC: 7 MMOL/L
BASOPHILS # BLD AUTO: 0 K/UL (ref 0–0.2)
BASOPHILS NFR BLD AUTO: 1 %
BUN SERPL-SCNC: 45 MG/DL (ref 7–17)
CALCIUM SPEC-MCNC: 8.6 MG/DL (ref 8.4–10.2)
CHLORIDE SERPL-SCNC: 94 MMOL/L (ref 98–107)
CO2 SERPL-SCNC: 37 MMOL/L (ref 22–30)
EOSINOPHIL # BLD AUTO: 0.1 K/UL (ref 0–0.7)
EOSINOPHIL NFR BLD AUTO: 3 %
ERYTHROCYTE [DISTWIDTH] IN BLOOD BY AUTOMATED COUNT: 2.73 M/UL (ref 3.8–5.4)
ERYTHROCYTE [DISTWIDTH] IN BLOOD: 18.6 % (ref 11.5–15.5)
GLUCOSE SERPL-MCNC: 130 MG/DL (ref 74–99)
HCT VFR BLD AUTO: 33.6 % (ref 34–46)
HGB BLD-MCNC: 10.6 GM/DL (ref 11.4–16)
LYMPHOCYTES # SPEC AUTO: 0.7 K/UL (ref 1–4.8)
LYMPHOCYTES NFR SPEC AUTO: 18 %
MCH RBC QN AUTO: 38.8 PG (ref 25–35)
MCHC RBC AUTO-ENTMCNC: 31.6 G/DL (ref 31–37)
MCV RBC AUTO: 123 FL (ref 80–100)
MONOCYTES # BLD AUTO: 0.3 K/UL (ref 0–1)
MONOCYTES NFR BLD AUTO: 9 %
NEUTROPHILS # BLD AUTO: 2.5 K/UL (ref 1.3–7.7)
NEUTROPHILS NFR BLD AUTO: 67 %
PLATELET # BLD AUTO: 139 K/UL (ref 150–450)
POTASSIUM SERPL-SCNC: 3.7 MMOL/L (ref 3.5–5.1)
SODIUM SERPL-SCNC: 138 MMOL/L (ref 137–145)
WBC # BLD AUTO: 3.7 K/UL (ref 3.8–10.6)

## 2019-07-09 RX ADMIN — DIGOXIN SCH: 125 TABLET ORAL at 09:05

## 2019-07-09 RX ADMIN — METOPROLOL TARTRATE SCH: 25 TABLET, FILM COATED ORAL at 08:28

## 2019-07-09 RX ADMIN — DIGOXIN SCH MCG: 125 TABLET ORAL at 08:25

## 2019-07-09 RX ADMIN — IPRATROPIUM BROMIDE AND ALBUTEROL SULFATE SCH: .5; 3 SOLUTION RESPIRATORY (INHALATION) at 12:11

## 2019-07-09 RX ADMIN — LOPERAMIDE HYDROCHLORIDE PRN MG: 2 CAPSULE ORAL at 07:23

## 2019-07-09 RX ADMIN — SPIRONOLACTONE SCH: 25 TABLET, FILM COATED ORAL at 09:01

## 2019-07-09 RX ADMIN — APIXABAN SCH MG: 5 TABLET, FILM COATED ORAL at 08:23

## 2019-07-09 RX ADMIN — Medication SCH MG: at 08:26

## 2019-07-09 RX ADMIN — IPRATROPIUM BROMIDE AND ALBUTEROL SULFATE SCH: .5; 3 SOLUTION RESPIRATORY (INHALATION) at 09:30

## 2019-07-09 NOTE — P.PN
Subjective


Progress Note Date: 07/09/19





This is 73-year-old female with metastatic ovarian cancer and also chronic 

atrial fibrillation was admitted to the hospital with pleural effusion.  Patient

had pleural tap yesterday.  Patient is feeling much better today.  Her heart 

rate is in the 50s.  Patient has underlying pacemaker.  Her blood pressure is 

also running low.  Patient is off verapamil.  We'll continue with Lanoxin.  

Lungs appeared to be clear.  Patient is not having any chest pain or shortness 

of breath.  Increase activity as tolerated





Objective





- Vital Signs


Vital signs: 


                                   Vital Signs











Temp  97.5 F L  07/09/19 07:25


 


Pulse  57 L  07/09/19 11:32


 


Resp  16   07/09/19 11:24


 


BP  83/51   07/09/19 11:32


 


Pulse Ox  92 L  07/09/19 07:25








                                 Intake & Output











 07/08/19 07/09/19 07/09/19





 18:59 06:59 18:59


 


Intake Total 660  


 


Balance 660  


 


Weight  73.5 kg 


 


Intake:   


 


  Oral 660  


 


Other:   


 


  Voiding Method  Toilet Toilet


 


  # Voids 2 1 














- Exam





GENERAL EXAM: Patient is alert and oriented and doesn't appear to be in any 

acute distress


HEENT: Normocephalic. Normal reaction of pupils, equal size, normal range of 

extraocular motion. No erythema or exudates in the throat.


NECK: No masses, no nuchal rigidity.


CHEST: No chest wall deformity.


LUNGS: Equal air entry with no crackles or wheeze.


HEART: S1 and S2 normal with no audible mumurs or gallops. Regular rhythm, 

femorals equal on both sides..


ABDOMEN: No hepatosplenomegaly, normal bowel sounds, no guarding or rigidity.


SKIN: No rashes


CENTRAL NERVOUS SYSTEM: No focal deficits.


EXTREMITIES: No cyanosis, clubbing or edema.





- Labs


CBC & Chem 7: 


                                 07/09/19 08:07





                                 07/09/19 08:07


Labs: 


                  Abnormal Lab Results - Last 24 Hours (Table)











  07/09/19 07/09/19 Range/Units





  08:07 08:07 


 


WBC  3.7 L   (3.8-10.6)  k/uL


 


RBC  2.73 L   (3.80-5.40)  m/uL


 


Hgb  10.6 L   (11.4-16.0)  gm/dL


 


Hct  33.6 L   (34.0-46.0)  %


 


MCV  123.0 H   (80.0-100.0)  fL


 


MCH  38.8 H   (25.0-35.0)  pg


 


RDW  18.6 H   (11.5-15.5)  %


 


Plt Count  139 L   (150-450)  k/uL


 


Lymphocytes #  0.7 L   (1.0-4.8)  k/uL


 


Macrocytosis  Marked A   


 


Chloride   94 L  ()  mmol/L


 


Carbon Dioxide   37 H  (22-30)  mmol/L


 


BUN   45 H  (7-17)  mg/dL


 


Creatinine   1.19 H  (0.52-1.04)  mg/dL


 


Glucose   130 H  (74-99)  mg/dL








                      Microbiology - Last 24 Hours (Table)











 07/05/19 13:00 Gram Stain - Final





 Thoracic Fluid Body Fluid Culture - Final














Assessment and Plan


(1) Congestive heart failure


Current Visit: Yes   Status: Acute   Code(s): I50.9 - HEART FAILURE, UNSPECIFIED

  SNOMED Code(s): 59878698


   





(2) Recurrent right pleural effusion


Current Visit: Yes   Status: Acute   Code(s): J90 - PLEURAL EFFUSION, NOT 

ELSEWHERE CLASSIFIED   SNOMED Code(s): 57211137


   





(3) Atrial fibrillation with RVR


Current Visit: No   Status: Acute   Code(s): I48.91 - UNSPECIFIED ATRIAL 

FIBRILLATION   SNOMED Code(s): 873233936622032


   





(4) Bradycardia


Current Visit: No   Status: Acute   Code(s): R00.1 - BRADYCARDIA, UNSPECIFIED   

SNOMED Code(s): 81443621


   





(5) Hypotension


Current Visit: No   Status: Acute   Priority: High   Code(s): I95.9 - 

HYPOTENSION, UNSPECIFIED   SNOMED Code(s): 24884539


   


Plan: 


Patient's verapamil was held.  Lopressor was also being held.  Heart rate is in 

the 50s.  It patient is clinically stable.  We'll continue monitoring her blood 

pressure.  Possible discharge name

## 2019-09-16 ENCOUNTER — HOSPITAL ENCOUNTER (OUTPATIENT)
Dept: HOSPITAL 47 - RADCTMAIN | Age: 74
Discharge: HOME | End: 2019-09-16
Attending: INTERNAL MEDICINE
Payer: MEDICARE

## 2019-09-16 DIAGNOSIS — C56.9: ICD-10-CM

## 2019-09-16 DIAGNOSIS — J90: Primary | ICD-10-CM

## 2019-09-16 DIAGNOSIS — D73.89: ICD-10-CM

## 2019-09-16 DIAGNOSIS — R18.8: ICD-10-CM

## 2019-09-16 LAB — BUN SERPL-SCNC: 13 MG/DL (ref 7–17)

## 2019-09-16 PROCEDURE — 71260 CT THORAX DX C+: CPT

## 2019-09-16 PROCEDURE — 36415 COLL VENOUS BLD VENIPUNCTURE: CPT

## 2019-09-16 PROCEDURE — 84520 ASSAY OF UREA NITROGEN: CPT

## 2019-09-16 PROCEDURE — 82565 ASSAY OF CREATININE: CPT

## 2019-09-16 PROCEDURE — 74177 CT ABD & PELVIS W/CONTRAST: CPT

## 2019-09-16 NOTE — CT
EXAMINATION TYPE: CT ChestAbdPelvis w con

 

DATE OF EXAM: 9/16/2019

 

COMPARISON: CT chest abdomen and pelvis January 16, 2019 and several older CTs

 

HISTORY: ovarian CA

 

CT DLP: 742.9 mGycm. Automated Exposure Control for Dose Reduction was Utilized.

 

CONTRAST: 

CT scan of the thorax, abdomen and pelvis is performed with IV Contrast, patient injected with 100 mL
 of Isovue 300.

 

FINDINGS:

 

LUNGS: There is moderate-sized right pleural fluid collection or effusion which is increased in size 
from prior. There is central and basilar scarring and/or atelectasis. No suspicious new nodules or ma
sses. Left lung remains clear. 

 

MEDIASTINUM: There are no greater than 1 cm hilar or mediastinal lymph nodes. New tiny pericardial ef
fusion is seen.  Heart size is mildly enlarged. There is dual lead pacemaker now identified.

 

LIVER/GB: New suspicious peripheral hypodense lesion posterior right hepatic lobe image 51 measuring 
1.5 cm. There is increasing perihepatic ascites along superior and right lateral margin. Stable subce
ntimeter low dense lesion posterior right hepatic lobe axial image 44.

New hyperdensity of the left hepatic lobe.

 

PANCREAS: No significant abnormality is seen.

 

SPLEEN: Heterogeneity of spleen with enlarging heterogeneous hypodense 2.2 cm lesion anteriorly on im
age 49. Some new adjacent lateral ascites is present. 

 

ADRENALS: Right adrenal mass measures 2.1 x 1.5 cm not significantly changed from prior studies.

 

KIDNEYS: Symmetric cortical medullary uptake and excretion without hydronephrosis. Bladder poorly dis
tended and less suboptimally evaluated.

 

BOWEL: Oral contrast reaches the level of the hepatic flexure. There is no suspicious small or large 
bowel dilatation. Surgical sutures sigmoid rectal colon axial image 97 are redemonstrated. No suspici
ous bowel dilatation. There is fecal prominence in the rectum now noted.

 

GENITAL ORGANS: Uterus is surgically absent.

 

LYMPH NODES: There is no suspicious soft tissue density probable confluent adenopathy along the seros
al surface of the gastric antrum and invading janelle hepatis axial image 56. There is no retroperitone
al adenopathy for reference aortocaval lymph node or low dense lesion measuring 2.2 x 1.5 cm axial im
age 64. For reference there is anterior periaortic lymph node and left para-aortic adenopathy axial i
mage 75  before aortic bifurcation, anterior lymph node measures 2.3 x 1.3 cm. There are new left jerry
ac chain lymph nodes for reference measuring 3.3 x 1.6 cm axial image 94.

 

OSSEOUS STRUCTURES: Moderate to severe narrowing and spurring of both hip joints. . Multilevel disc s
pace narrowing in the lumbar spine.

 

OTHER: No significant additional abnormality is seen.

 

IMPRESSION: Interval neoplastic progression with increasing ascites, new suspicious posterior right c
apsular liver lesion. Enlarging splenic lesion. Developing adenopathy and recurrent peritoneal diseas
e suspected. Enlarging moderate-sized right pleural effusion is noted.

## 2019-10-22 ENCOUNTER — HOSPITAL ENCOUNTER (INPATIENT)
Dept: HOSPITAL 47 - EC | Age: 74
LOS: 2 days | Discharge: HOSPICE | DRG: 755 | End: 2019-10-24
Attending: HOSPITALIST | Admitting: HOSPITALIST
Payer: MEDICARE

## 2019-10-22 VITALS — BODY MASS INDEX: 24.7 KG/M2

## 2019-10-22 DIAGNOSIS — Z79.899: ICD-10-CM

## 2019-10-22 DIAGNOSIS — Z88.5: ICD-10-CM

## 2019-10-22 DIAGNOSIS — I48.92: ICD-10-CM

## 2019-10-22 DIAGNOSIS — C79.9: ICD-10-CM

## 2019-10-22 DIAGNOSIS — R59.9: ICD-10-CM

## 2019-10-22 DIAGNOSIS — C56.9: Primary | ICD-10-CM

## 2019-10-22 DIAGNOSIS — C78.7: ICD-10-CM

## 2019-10-22 DIAGNOSIS — Z79.01: ICD-10-CM

## 2019-10-22 DIAGNOSIS — Z90.49: ICD-10-CM

## 2019-10-22 DIAGNOSIS — I13.0: ICD-10-CM

## 2019-10-22 DIAGNOSIS — I45.10: ICD-10-CM

## 2019-10-22 DIAGNOSIS — N18.3: ICD-10-CM

## 2019-10-22 DIAGNOSIS — I50.32: ICD-10-CM

## 2019-10-22 DIAGNOSIS — Z95.0: ICD-10-CM

## 2019-10-22 DIAGNOSIS — R06.02: ICD-10-CM

## 2019-10-22 DIAGNOSIS — Z82.49: ICD-10-CM

## 2019-10-22 DIAGNOSIS — K57.90: ICD-10-CM

## 2019-10-22 DIAGNOSIS — Z83.3: ICD-10-CM

## 2019-10-22 DIAGNOSIS — Z92.21: ICD-10-CM

## 2019-10-22 DIAGNOSIS — K56.7: ICD-10-CM

## 2019-10-22 DIAGNOSIS — R63.0: ICD-10-CM

## 2019-10-22 DIAGNOSIS — E78.5: ICD-10-CM

## 2019-10-22 DIAGNOSIS — Z80.1: ICD-10-CM

## 2019-10-22 DIAGNOSIS — Z86.711: ICD-10-CM

## 2019-10-22 DIAGNOSIS — I48.19: ICD-10-CM

## 2019-10-22 DIAGNOSIS — R11.2: ICD-10-CM

## 2019-10-22 DIAGNOSIS — Z98.51: ICD-10-CM

## 2019-10-22 DIAGNOSIS — E86.0: ICD-10-CM

## 2019-10-22 DIAGNOSIS — Z51.5: ICD-10-CM

## 2019-10-22 DIAGNOSIS — J90: ICD-10-CM

## 2019-10-22 DIAGNOSIS — Z66: ICD-10-CM

## 2019-10-22 DIAGNOSIS — Z90.710: ICD-10-CM

## 2019-10-22 LAB
ALBUMIN SERPL-MCNC: 3.3 G/DL (ref 3.5–5)
ALP SERPL-CCNC: 117 U/L (ref 38–126)
ALT SERPL-CCNC: 26 U/L (ref 9–52)
ANION GAP SERPL CALC-SCNC: 15 MMOL/L
AST SERPL-CCNC: 33 U/L (ref 14–36)
BASOPHILS # BLD AUTO: 0.1 K/UL (ref 0–0.2)
BASOPHILS NFR BLD AUTO: 1 %
BILIRUB UR QL STRIP.AUTO: (no result)
BUN SERPL-SCNC: 42 MG/DL (ref 7–17)
CALCIUM SPEC-MCNC: 8.8 MG/DL (ref 8.4–10.2)
CHLORIDE SERPL-SCNC: 93 MMOL/L (ref 98–107)
CO2 SERPL-SCNC: 27 MMOL/L (ref 22–30)
DIGOXIN SERPL-MCNC: 2.1 NG/ML
EOSINOPHIL # BLD AUTO: 0 K/UL (ref 0–0.7)
EOSINOPHIL NFR BLD AUTO: 1 %
ERYTHROCYTE [DISTWIDTH] IN BLOOD BY AUTOMATED COUNT: 4.12 M/UL (ref 3.8–5.4)
ERYTHROCYTE [DISTWIDTH] IN BLOOD: 15.2 % (ref 11.5–15.5)
GLUCOSE SERPL-MCNC: 108 MG/DL (ref 74–99)
HCT VFR BLD AUTO: 41.9 % (ref 34–46)
HGB BLD-MCNC: 13.3 GM/DL (ref 11.4–16)
HYALINE CASTS UR QL AUTO: 688 /LPF (ref 0–2)
KETONES UR QL STRIP.AUTO: (no result)
LYMPHOCYTES # SPEC AUTO: 0.5 K/UL (ref 1–4.8)
LYMPHOCYTES NFR SPEC AUTO: 9 %
MCH RBC QN AUTO: 32.2 PG (ref 25–35)
MCHC RBC AUTO-ENTMCNC: 31.7 G/DL (ref 31–37)
MCV RBC AUTO: 101.8 FL (ref 80–100)
MONOCYTES # BLD AUTO: 0.2 K/UL (ref 0–1)
MONOCYTES NFR BLD AUTO: 4 %
NEUTROPHILS # BLD AUTO: 4.9 K/UL (ref 1.3–7.7)
NEUTROPHILS NFR BLD AUTO: 84 %
PH UR: 5.5 [PH] (ref 5–8)
PLATELET # BLD AUTO: 317 K/UL (ref 150–450)
POTASSIUM SERPL-SCNC: 4 MMOL/L (ref 3.5–5.1)
PROT SERPL-MCNC: 6.3 G/DL (ref 6.3–8.2)
PROT UR QL: (no result)
RBC UR QL: 1 /HPF (ref 0–5)
SODIUM SERPL-SCNC: 135 MMOL/L (ref 137–145)
SP GR UR: 1.03 (ref 1–1.03)
SQUAMOUS UR QL AUTO: 6 /HPF (ref 0–4)
UROBILINOGEN UR QL STRIP: 3 MG/DL (ref ?–2)
WBC # BLD AUTO: 5.8 K/UL (ref 3.8–10.6)
WBC #/AREA URNS HPF: 14 /HPF (ref 0–5)

## 2019-10-22 PROCEDURE — 80162 ASSAY OF DIGOXIN TOTAL: CPT

## 2019-10-22 PROCEDURE — 74019 RADEX ABDOMEN 2 VIEWS: CPT

## 2019-10-22 PROCEDURE — 99285 EMERGENCY DEPT VISIT HI MDM: CPT

## 2019-10-22 PROCEDURE — 93005 ELECTROCARDIOGRAM TRACING: CPT

## 2019-10-22 PROCEDURE — 96372 THER/PROPH/DIAG INJ SC/IM: CPT

## 2019-10-22 PROCEDURE — 81001 URINALYSIS AUTO W/SCOPE: CPT

## 2019-10-22 PROCEDURE — 80053 COMPREHEN METABOLIC PANEL: CPT

## 2019-10-22 PROCEDURE — 96375 TX/PRO/DX INJ NEW DRUG ADDON: CPT

## 2019-10-22 PROCEDURE — 96374 THER/PROPH/DIAG INJ IV PUSH: CPT

## 2019-10-22 PROCEDURE — 36415 COLL VENOUS BLD VENIPUNCTURE: CPT

## 2019-10-22 PROCEDURE — 96361 HYDRATE IV INFUSION ADD-ON: CPT

## 2019-10-22 PROCEDURE — 71046 X-RAY EXAM CHEST 2 VIEWS: CPT

## 2019-10-22 PROCEDURE — 85025 COMPLETE CBC W/AUTO DIFF WBC: CPT

## 2019-10-22 RX ADMIN — APIXABAN SCH MG: 2.5 TABLET, FILM COATED ORAL at 20:54

## 2019-10-22 RX ADMIN — CEFAZOLIN SCH MLS/HR: 330 INJECTION, POWDER, FOR SOLUTION INTRAMUSCULAR; INTRAVENOUS at 14:04

## 2019-10-22 RX ADMIN — METOPROLOL TARTRATE SCH: 25 TABLET, FILM COATED ORAL at 20:55

## 2019-10-22 NOTE — ED
General Adult HPI





- General


Chief complaint: Weakness


Stated complaint: WEAKNESS, VOMITING X 3 WEEKS, DIARRHEA


Time Seen by Provider: 10/22/19 08:56


Source: patient, family, RN notes reviewed


Mode of arrival: wheelchair


Limitations: physical limitation





- History of Present Illness


Initial comments: 





Patient is a pleasant 73-year-old female presenting to the emergency department 

with concerns for dehydration.  Patient has known ovarian cancer, last treatment

of May.  OB/GYN cancer has metastasized agrees to the liver and treatment has 

not been working.  Patient has been having diarrhea and vomiting and decreased 

oral intake over the past 2-3 weeks.  Patient is having diarrhea up to 3-4 times

daily.  Patient is vomiting one to 3 times daily.  Patient has mild discomfort 

in her abdomen at times.  No fever.  No confusion or weakness.





- Related Data


                                Home Medications











 Medication  Instructions  Recorded  Confirmed


 


Apixaban [Eliquis] 2.5 mg PO BID 03/15/19 10/22/19


 


Digoxin [Lanoxin] 125 mcg PO DAILY 07/03/19 10/22/19


 


Spironolactone 25 mg PO DAILY 07/03/19 10/22/19


 


Dronabinol [Marinol] 5 mg PO HS PRN 10/22/19 10/22/19


 


Esomeprazole Magnesium [NexIUM 20 mg PO DAILY 10/22/19 10/22/19





24Hr]   


 


Furosemide [Lasix] 20 mg PO DAILY PRN 10/22/19 10/22/19


 


HYDROcodone/APAP 10-325MG [Norco 1 tab PO QID PRN 10/22/19 10/22/19





]   


 


Metoprolol Tartrate [Lopressor] 25 mg PO BID 10/22/19 10/22/19


 


Prochlorperazine [Compazine] 10 mg PO Q6H PRN 10/22/19 10/22/19








                                  Previous Rx's











 Medication  Instructions  Recorded


 


Magnesium Oxide [Mag-Ox] 250 mg PO TID #1 tablet 19











                                    Allergies











Allergy/AdvReac Type Severity Reaction Status Date / Time


 


hydromorphone [From Dilaudid] AdvReac  Hallucinati Verified 10/22/19 10:34





   ons  














Review of Systems


ROS Statement: 


Those systems with pertinent positive or pertinent negative responses have been 

documented in the HPI.





ROS Other: All systems not noted in ROS Statement are negative.


Constitutional: Denies: fever


Eyes: Denies: eye pain


ENT: Denies: ear pain


Respiratory: Denies: dyspnea


Cardiovascular: Denies: chest pain


Endocrine: Reports: fatigue


Gastrointestinal: Reports: as per HPI, nausea, vomiting, diarrhea


Genitourinary: Denies: dysuria


Musculoskeletal: Denies: back pain


Skin: Denies: rash


Neurological: Denies: weakness





Past Medical History


Past Medical History: Atrial Fibrillation, Atrial Flutter, Cancer, 

Hyperlipidemia, Hypertension, Pulmonary Embolus (PE)


Additional Past Medical History / Comment(s): Metastatic ovarian cancer with 

hysterectomy/debulking and chemotherapy last dose 18, pancytopenia d/t 

chemo, afib/flutter with RVR in past, 2 pulmonary embolisms that pt believes 

were on the R side, 18 pt had acute CHF with pleural effusion and R sided 

thoracentesis, diverticular disease.  History of atrial fibrillation and the 

patient is a permanent pacemaker in place for now.


History of Any Multi-Drug Resistant Organisms: None Reported


Past Surgical History: Appendectomy, Cholecystectomy, Hysterectomy, Pacemaker, 

Tonsillectomy, Tubal Ligation


Additional Past Surgical History / Comment(s): Debulking surgery approximately 

, cervical polypectomies, colonoscopies.  Pacemaker - 2019


Past Anesthesia/Blood Transfusion Reactions: Motion Sickness, Postoperative 

Nausea & Vomiting (PONV)


Additional Past Anesthesia/Blood Transfusion Reaction / Comment(s): Pt believes 

she recieved blood with her debulking surgery.


Type of Cardiac Device: Permanent Pacemaker


Device Placement Date:: 2019


Past Psychological History: No Psychological Hx Reported


Smoking Status: Never smoker


Past Alcohol Use History: Rare


Past Drug Use History: None Reported





- Past Family History


  ** Mother


Family Medical History: AFIB, Chest Pain / Angina, Diabetes Mellitus, 

Hypertension


Additional Family Medical History / Comment(s): Mother is 92 yrs old.





  ** Father


Family Medical History: Cancer


Additional Family Medical History / Comment(s): Father  of lung cancer at 

the age of 78yrs.





General Exam


Limitations: physical limitation


General appearance: alert, in no apparent distress


Head exam: Present: normocephalic


Eye exam: Present: other (pale conjunctiva)


ENT exam: Present: mucous membranes dry


Neck exam: Present: normal inspection


Respiratory exam: Present: normal lung sounds bilaterally


Cardiovascular Exam: Present: irregular rhythm


GI/Abdominal exam: Present: soft.  Absent: distended, tenderness


Extremities exam: Present: normal inspection


Neurological exam: Present: alert


Psychiatric exam: Present: normal affect, normal mood


Skin exam: Present: normal color





Course


                                   Vital Signs











  10/22/19 10/22/19





  08:40 10:43


 


Temperature 97.4 F L 97.6 F


 


Pulse Rate 65 82


 


Respiratory 17 16





Rate  


 


Blood Pressure 98/87 104/65


 


O2 Sat by Pulse 97 98





Oximetry  














EKG Findings





- EKG Comments:


EKG Findings:: A. fib with some paced complexes.  Rate 67.  .  .  

QTc 429.  Normal axis.  Right bundle branch block.  Diffuse T-wave inversion.





Medical Decision Making





- Medical Decision Making





Patient evaluated and resting comfortably in bed.  Patient and family updated on

results and plan Ace was discussed in detail with Dr. Edwards, who will admit 

covering for Dr. Garibay.





- Lab Data


Result diagrams: 


                                 10/22/19 10:59





                                 10/22/19 10:59


                                   Lab Results











  10/22/19 10/22/19 Range/Units





  10:59 10:59 


 


WBC  5.8   (3.8-10.6)  k/uL


 


RBC  4.12   (3.80-5.40)  m/uL


 


Hgb  13.3   (11.4-16.0)  gm/dL


 


Hct  41.9   (34.0-46.0)  %


 


MCV  101.8 H   (80.0-100.0)  fL


 


MCH  32.2   (25.0-35.0)  pg


 


MCHC  31.7   (31.0-37.0)  g/dL


 


RDW  15.2   (11.5-15.5)  %


 


Plt Count  317   (150-450)  k/uL


 


Neutrophils %  84   %


 


Lymphocytes %  9   %


 


Monocytes %  4   %


 


Eosinophils %  1   %


 


Basophils %  1   %


 


Neutrophils #  4.9   (1.3-7.7)  k/uL


 


Lymphocytes #  0.5 L   (1.0-4.8)  k/uL


 


Monocytes #  0.2   (0-1.0)  k/uL


 


Eosinophils #  0.0   (0-0.7)  k/uL


 


Basophils #  0.1   (0-0.2)  k/uL


 


Hypochromasia  Moderate   


 


Macrocytosis  Slight   


 


Sodium   135 L  (137-145)  mmol/L


 


Potassium   4.0  (3.5-5.1)  mmol/L


 


Chloride   93 L  ()  mmol/L


 


Carbon Dioxide   27  (22-30)  mmol/L


 


Anion Gap   15  mmol/L


 


BUN   42 H  (7-17)  mg/dL


 


Creatinine   1.15 H  (0.52-1.04)  mg/dL


 


Est GFR (CKD-EPI)AfAm   55  (>60 ml/min/1.73 sqM)  


 


Est GFR (CKD-EPI)NonAf   48  (>60 ml/min/1.73 sqM)  


 


Glucose   108 H  (74-99)  mg/dL


 


Calcium   8.8  (8.4-10.2)  mg/dL


 


Total Bilirubin   0.7  (0.2-1.3)  mg/dL


 


AST   33  (14-36)  U/L


 


ALT   26  (9-52)  U/L


 


Alkaline Phosphatase   117  ()  U/L


 


Total Protein   6.3  (6.3-8.2)  g/dL


 


Albumin   3.3 L  (3.5-5.0)  g/dL


 


Digoxin   2.1  ng/mL














Disposition


Clinical Impression: 


 Dehydration





Disposition: ADMITTED AS IP TO THIS HOSP


Is patient prescribed a controlled substance at d/c from ED?: No


Referrals: 


Hermelindo Garibay DO [Primary Care Provider] - 1-2 days


Decision Time: 12:34

## 2019-10-23 VITALS — RESPIRATION RATE: 16 BRPM

## 2019-10-23 RX ADMIN — CEFAZOLIN SCH MLS/HR: 330 INJECTION, POWDER, FOR SOLUTION INTRAMUSCULAR; INTRAVENOUS at 05:05

## 2019-10-23 RX ADMIN — CEFAZOLIN SCH MLS/HR: 330 INJECTION, POWDER, FOR SOLUTION INTRAMUSCULAR; INTRAVENOUS at 16:02

## 2019-10-23 RX ADMIN — APIXABAN SCH MG: 2.5 TABLET, FILM COATED ORAL at 21:26

## 2019-10-23 RX ADMIN — METOPROLOL TARTRATE SCH MG: 25 TABLET, FILM COATED ORAL at 21:26

## 2019-10-23 RX ADMIN — APIXABAN SCH MG: 2.5 TABLET, FILM COATED ORAL at 08:25

## 2019-10-23 RX ADMIN — METOPROLOL TARTRATE SCH MG: 25 TABLET, FILM COATED ORAL at 08:25

## 2019-10-23 NOTE — P.CONS
History of Present Illness





- Reason for Consult


Consult date: 10/23/19


Ovarian cancer,N/V/SOB, weakness





- History of Present Illness





This is a 73 yr old WF, pt of Dr Restrepo,  who presented with abdominal pain and

distension for about a month duration.


U/S done on 10/14/2016 revealed evidence of ascites.


Diagnostic paracentesis done on 10/28/2016,cytology revealed metastatic 

carcinoma consistent with non mucinous ovarian primary.


On 2016, was 499.


On 2016,CT scan of chest/abdomen/pelvis revealed ascites,omental caking 

and small bilateral pleural effusion.BRCA testing were negative.





She underwent optimal debulking surgery on 2016,R1 resection,her surgery 

was complicated by PE.Pathology revealed high grade serous 

adenocarcinoma,positive peritoneal washing and peritoneal mestastasis over 2 cm 

beyond pelvis and positive mesenteric nodes.





She started carboplatin/taxol on 2017. She completed 6 cycles of adjuvant 

carboplatin/taxol on 2017





On 2017,repeat CT scan of chest/abdomen/plevis revealed mesenteric nodules

in the region of the gastrohepatic ligament and perihepatic regions which could 

represent adenopathy, omental metastasis, not well appreciated on the prior 

examination as there was abdominal ascites that had resolved.





On 2018,repeat CT scan of chest/abdomen/pelvis revealed evidence of 

disease progression.





 She started gemzar/avastin on 2018





On 2018, was progressing, up to 205. On 2018,repeat CT scan of 

chest/abdomen/pelvis revealed new small right pleural effusion,otherwise 

stable.Gemzar/avastin were discontinued.


On 2018,she started carboplatin/doxil





She was admitted to hospital on 2018 because of A fib and RVR,also had 

thoracentesis for pleural effusion,cytology was negative for malignancy,her 

fluid was likely cardiac.Her echocardiogram on 2018 revealed EF was 55%.





She completed 6 cycles of carboplatin/doxil on 2018. She was admitted to 

hospital after cycle#6 for symptomatic anemia and worsening A-Fib





Repeat CT scan of chest/abdomen/pelvis on 2019 revealed no evidence of 

disease progression





She started lynparza on 2019,her  was 254 when she started it it. On 

04/10/2019, went up to 308.9,lynparza was discontinued.





She started xeloda in 2019


On 2019,xeloda was held due to diarrhea and dehydration,worsening hand-

foot syndrome.


On 2019, was 368.7


On 2019,repeat CT scan of chest/abdomen/pelvis revealed evidence of 

disease progression with new liver lesions.





 When seen in the office on 19 she was very tired,weak, recurrent abdominal

pain,generalized,using  norco with relief,  leg edema improved, chronic dyspnea.

Ca 125 was up to 477.6.     


    She and her family were advised by Dr. Hillman that her overall prognosis was 

felt to be poor, given progression after multiple lines of therapy and poor 

performance status.  The patient didn't want active treatment at that time, and 

weekly carboplatin was suggested.  She was supposed to start the same on 

10/11/19.  However she has not been able to do so as she was not feeling well, 

due to persistent nausea, with vomiting off and on, intermittent diarrhea, 

generalized weakness and poor appetite.  She was to follow-up in the office on  to discuss options, including comfort care.  However due to progression 

of her multiple symptoms as noted above, as well as some perceived increasing 

shortness of breath or baseline, she came into the hospital and was admitted for

further management.


 Consult was placed for further evaluation and a conditions.  He denied any 

fevers/chills or obvious bleeding.





Review of Systems


Constitutional: Reports chronic pain, Reports fatigue, Reports malaise, Reports 

poor appetite, Reports weight loss


Eyes: denies blurred vision, denies pain


Ears: deny: decreased hearing, ear discharge, earache, tinnitus


Ears, nose, mouth and throat: Denies headache, Denies sore throat


Cardiovascular: Reports shortness of breath


Respiratory: Reports dyspnea


Gastrointestinal: Reports abdominal pain, Reports diarrhea, Reports nausea, 

Reports vomiting


Genitourinary: Denies dysuria, Denies hematuria


Menstruation: Reports postmenopausal


Musculoskeletal: Reports muscle weakness


Integumentary: Denies pruritus, Denies rash


Neurological: Reports weakness


Psychiatric: Reports as per HPI


Endocrine: Reports fatigue, Reports weight change


Hematologic/Lymphatic: Reports as per HPI





Past Medical History


Past Medical History: Atrial Fibrillation, Atrial Flutter, Cancer, Hyper

lipidemia, Hypertension, Pulmonary Embolus (PE)


Additional Past Medical History / Comment(s): Metastatic ovarian cancer with 

hysterectomy/debulking and chemotherapy last dose 18, pancytopenia d/t 

chemo, afib/flutter with RVR in past, 2 pulmonary embolisms that pt believes 

were on the R side, 18 pt had acute CHF with pleural effusion and R sided 

thoracentesis, diverticular disease.  History of atrial fibrillation and the 

patient is a permanent pacemaker in place for now.


History of Any Multi-Drug Resistant Organisms: None Reported


Past Surgical History: Appendectomy, Cholecystectomy, Hysterectomy, Pacemaker, 

Tonsillectomy, Tubal Ligation


Additional Past Surgical History / Comment(s): Debulking surgery approximately 

, cervical polypectomies, colonoscopies.  Pacemaker - 2019


Past Anesthesia/Blood Transfusion Reactions: Motion Sickness, Postoperative 

Nausea & Vomiting (PONV)


Additional Past Anesthesia/Blood Transfusion Reaction / Comm: Pt believes she 

recieved blood with her debulking surgery.


Type of Cardiac Device: Permanent Pacemaker


Device Placement Date:: 2019


Past Psychological History: No Psychological Hx Reported


Additional Psychological History / Comment(s): Pt resides with her spouse.  She 

is independent.


Smoking Status: Never smoker


Past Alcohol Use History: Rare


Past Drug Use History: None Reported





- Past Family History


  ** Mother


Family Medical History: AFIB, Chest Pain / Angina, Diabetes Mellitus, 

Hypertension


Additional Family Medical History / Comment(s): Mother is 92 yrs old.





  ** Father


Family Medical History: Cancer


Additional Family Medical History / Comment(s): Father  of lung cancer at 

the age of 78yrs.





Medications and Allergies


                                Home Medications











 Medication  Instructions  Recorded  Confirmed  Type


 


Magnesium Oxide [Mag-Ox] 250 mg PO TID #1 tablet 01/09/19 10/22/19 Rx


 


Apixaban [Eliquis] 2.5 mg PO BID 03/15/19 10/22/19 History


 


Digoxin [Lanoxin] 125 mcg PO DAILY 07/03/19 10/22/19 History


 


Spironolactone 25 mg PO DAILY 07/03/19 10/22/19 History


 


Dronabinol [Marinol] 5 mg PO HS PRN 10/22/19 10/22/19 History


 


Esomeprazole Magnesium [NexIUM 20 mg PO DAILY 10/22/19 10/22/19 History





24Hr]    


 


Furosemide [Lasix] 20 mg PO DAILY PRN 10/22/19 10/22/19 History


 


HYDROcodone/APAP 10-325MG [Norco 1 tab PO QID PRN 10/22/19 10/22/19 History





]    


 


Metoprolol Tartrate [Lopressor] 25 mg PO BID 10/22/19 10/22/19 History


 


Prochlorperazine [Compazine] 10 mg PO Q6H PRN 10/22/19 10/22/19 History








                                    Allergies











Allergy/AdvReac Type Severity Reaction Status Date / Time


 


hydromorphone [From Dilaudid] AdvReac  Hallucinati Verified 10/22/19 10:34





   ons  














Physical Exam


Vitals: 


                                   Vital Signs











  Temp Pulse Pulse Pulse Resp BP BP


 


 10/23/19 11:37  97.3 F L    66  15   91/57


 


 10/23/19 05:26  97.4 F L   65   18   100/61


 


 10/22/19 20:57  97.4 F L    87  18   96/64


 


 10/22/19 16:00     59 L  14  


 


 10/22/19 15:19  97.5 F L    59 L  14   99/52


 


 10/22/19 14:00   70    16  95/65 


 


 10/22/19 13:00   59 L    14  97/58 














  Pulse Ox


 


 10/23/19 11:37  93 L


 


 10/23/19 05:26  93 L


 


 10/22/19 20:57  95


 


 10/22/19 16:00 


 


 10/22/19 15:19  91 L


 


 10/22/19 14:00  96


 


 10/22/19 13:00  95








                                Intake and Output











 10/22/19 10/23/19 10/23/19





 22:59 06:59 14:59


 


Intake Total  600 


 


Balance  600 


 


Intake:   


 


  Intake, IV Titration  600 





  Amount   


 


    Sodium Chloride 0.9% 1,  600 





    000 ml @ 75 mls/hr IV .   





    [id number] [facility] Rx#:[id number]   


 


Other:   


 


  Voiding Method Toilet  





 Bedside Commode  


 


  # Voids 1  














- Constitutional


General appearance: no acute distress





- EENT


Eyes: EOMI, PERRLA


ENT: hearing grossly normal, normal oropharynx





- Neck


Neck: no lymphadenopathy


Thyroid: bilateral: normal size





- Respiratory


Respiratory: right: diminished





- Cardiovascular


Rhythm: regular


Heart sounds: normal: S1, S2





- Gastrointestinal


General gastrointestinal: normal bowel sounds, soft





- Integumentary


Integumentary: normal





- Neurologic


Neurologic: CNII-XII intact





- Musculoskeletal


Musculoskeletal: generalized weakness, strength equal bilaterally





- Psychiatric


Psychiatric: A&O x's 3, appropriate affect





Results


CBC & Chem 7: 


                                 10/22/19 10:59





                                 10/22/19 10:59


Labs: 


                  Abnormal Lab Results - Last 24 Hours (Table)











  10/22/19 Range/Units





  19:39 


 


Urine Appearance  Cloudy H  (Clear)  


 


Urine Protein  1+ H  (Negative)  


 


Urine Ketones  1+ H  (Negative)  


 


Urine Bilirubin  1+ H  (Negative)  


 


Ur Leukocyte Esterase  Small H  (Negative)  


 


Urine WBC  14 H  (0-5)  /hpf


 


Ur Squamous Epith Cells  6 H  (0-4)  /hpf


 


Urine Bacteria  Rare H  (None)  /hpf


 


Hyaline Casts  688 H  (0-2)  /lpf


 


Urine Mucus  Many H  (None)  /hpf











Chest x-ray: report reviewed


CT scan - abdomen: report reviewed


CT scan - pelvis: report reviewed





Assessment and Plan


(1) Nausea & vomiting


Narrative/Plan: 


  This has been ongoing chronically, over the last several weeks, with slow 

progression.  The patient is having bowel movements and passing gas.  On exam 

she does not have any evidence of definite obstruction but some degree of ileus 

or partial obstruction is possible from tumor progression.  Supportive treatment

with IV antiemetics.  Check x-rays of the abdomen and pelvis


Current Visit: Yes   Status: Acute   Code(s): R11.2 - NAUSEA WITH VOMITING, 

UNSPECIFIED   SNOMED Code(s): 09779881


   





(2) Dehydration


Narrative/Plan: 


 Due to above, and diarrhea.  IV hydration.  The patient does feel somewhat b

radha with hydration overnight


Current Visit: Yes   Status: Acute   Code(s): E86.0 - DEHYDRATION   SNOMED 

Code(s): 61099537


   





(3) Ovarian cancer


Narrative/Plan: 


  The patient has documented progressive disease on CT scan done in , as 

well as tumor markers. She has had a detailed discussion with Dr. Hillman, 

regarding options.  Overall prognosis is felt to be poor, as noted in the HPI.  

As she didn't want active treatment at that time, she was supposed to start 

weekly carboplatin.  However chances of a major response are not very high, as 

the patient has had this regimen before.  However her performance status is too 

poor to permit a more aggressive regimen.


 The patient is well aware of the above.  In addition, she has not been able to 

start treatment due to her multiple symptoms and progressive decline.


   I therefore again discussed options with her.  Given her overall guarded 

prognosis, and limited options for active treatment with her current performance

status, comfort care approach with hospice was discussed.  The patient's 

questions were answered to the best of my ability.  At this time she is open to 

the same, as she feels that her clinical decline over the last few weeks 

indicates that she will likely not do well with any active treatment.


  I will discuss further treatment, and if he is in agreement, informational 

consult to hospice will be placed.  Continue supportive care in the meantime 


Current Visit: No   Status: Acute   Priority: Medium   Code(s): C56.9 - 

MALIGNANT NEOPLASM OF UNSPECIFIED OVARY   SNOMED Code(s): 516512953


   





(4) Pleural effusion


Narrative/Plan: 


 The patient does report shortness of breath, which is chronic and may be 

somewhat progressive.  On questioning it is difficult to determine if this is tr

ue shortness of breath or due to weakness.  On exam she does have air entry into

at least half or more of the right lung field.  Her last chest x-ray showed 

stable right pleural effusion


  Chest x-ray will be repeated to see if there has been progression.


Current Visit: No   Status: Acute   Code(s): J90 - PLEURAL EFFUSION, NOT 

ELSEWHERE CLASSIFIED   SNOMED Code(s): 03034611


   


Plan: 





Defer to the admitting service for management of her multiple other medical 

problems

## 2019-10-23 NOTE — XR
EXAMINATION TYPE: XR abdomen 2V

 

DATE OF EXAM: 10/23/2019

 

COMPARISON: NONE

 

HISTORY: Nausea and vomiting

 

TECHNIQUE: 2 views supine and upright

 

FINDINGS: There are some dilated air and fluid-filled small bowel loops in the mid abdomen. There is 
small amount of air in the transverse colon. There is no sign of free air. There is blunting of costo
phrenic angles. There is some infiltrate and atelectasis at both lung bases.

 

IMPRESSION: Dilated small bowel suggestive of ileus or mechanical bowel obstruction. No free air.

 

Pleural effusions and basilar pulmonary atelectasis.

## 2019-10-23 NOTE — P.HPIM
History of Present Illness


H&P Date: 10/23/19


Chief Complaint: Weak tired





History of presenting complaint:


This is a very pleasant 73-year-old patient of Dr. Garibay.  Patient's 

oncologist Dr. Hillman.  Patient has a diagnosis of metastatic ovarian cancer and 

has status post chemotherapy.  Different regimens have been tried.  The last 

visit seems treatment has not working.  For at least 3 weeks patient is 

progressively getting weak and tired.  Appetite is poor.  Last chemotherapy was 

in May of this year.  Finally more and more difficult to get about.  Some 

swelling is present.  No fever no chills.  Overall very rundown.  Admitted for 

the same.





Review of systems:


GEN.:  Weak tired rundown poor appetite


EYES: None


HEENT: None


NECK: None


RESPIRATORY: Short of breath


CARDIOVASCULAR: None


GASTROINTESTINAL: None


GENITOURINARY: None


MUSCULOSKELETAL: Joint pains, muscle weakness]


LYMPHATICS: None


HEMATOLOGICAL: None  


PSYCHIATRY: None


NEUROLOGICAL: None





Past medical history to include:


Atrial flutter fibrillation, hypertension, hyperlipidemia, pulmonary embolism, 

the studies were within cancer with hysterectomy debulking surgery and 

chemotherapy, pleural effusions with thoracentesis, diverticular disease, perm

anent pacemaker.





Social history:


, lives with her .  No smoking.  Alcohol rarely.





Physical examination:


VITAL SIGNS: 97.4, 65, 17, 98/87, 97% room air


GENERAL: BMI 24.8, laying in bed tired appearing.


EYES: [Pupils equal.  Conjunctiva pale l.


HEENT: External appearance of nose and ears normal, oral cavity grossly normal.


NECK: JVD not raised; masses not palpable.


HEART: First and second heart sounds are normal; some edema.  


LUNGS: Respiratory rate normal; decreased breath sounds.  


ABDOMEN: Soft, mild tenderness, liver spleen not palpable, no masses palpable.  


PSYCH: Alert and oriented x3;  mood  and affect a bit lowl.  


NEUROLOGICAL: Cranial nerves grossly intact; no facial asymmetry,   power and 

sensation grossly intact. 


LYMPHATICS: No lymph nodes palpable in the axilla and neck


MUSCULOSKELETAL: Evidence of OA in the hands





INVESTIGATIONS, reviewed in the clinical context:


White count 5.8 hemoglobin 13.3 potassium 442 crit 1.15


EKG tracing personally reviewed by me-atrial flutter- fibrillation, rate 

controlled


Chest x-ray film personally reviewed by me-right pleural effusion





Assessment:


-Metastatic ovarian cancer for which patient has received debulking surgery and 

chemotherapy.  As he seems to be progressive in the current agents are not seem 

to be working anymore.  Patient has discussed this with her oncologist Dr. Hillman.


-Current symptoms of anorexia asthenia, medical debility all felt to be from 

progressive malignancy.  No evidence of infection


-Persistent atrial flutter fibrillation


-Hyperlipidemia


-Essential hypertension


-(Pacemaker


-Chronic congestive heart failure from gastric dysfunction EF 50-55%


-Chronic kidney disease stage III from nephrosclerosis


-CODE STATUS DO NOT RESUSCITATE





Plan:


-The discussion was held with the patient and the hospital.  They do understand 

patient's guarded prognosis.  Did talk about hospice.  They will consider the 

same.  INFORMATIONAL visit from the hospice.  Home medications resumed.  IV 

fluids are being given.  Oral intake is encouraged.  Dr. Adam from oncology saw 

the patient earlier today.





Past Medical History


Past Medical History: Atrial Fibrillation, Atrial Flutter, Cancer, 

Hyperlipidemia, Hypertension, Pulmonary Embolus (PE)


Additional Past Medical History / Comment(s): Metastatic ovarian cancer with 

hysterectomy/debulking and chemotherapy last dose 18, pancytopenia d/t 

chemo, afib/flutter with RVR in past, 2 pulmonary embolisms that pt believes 

were on the R side, 18 pt had acute CHF with pleural effusion and R sided 

thoracentesis, diverticular disease.  History of atrial fibrillation and the 

patient is a permanent pacemaker in place for now.


History of Any Multi-Drug Resistant Organisms: None Reported


Past Surgical History: Appendectomy, Cholecystectomy, Hysterectomy, Pacemaker, 

Tonsillectomy, Tubal Ligation


Additional Past Surgical History / Comment(s): Debulking surgery approximately 

, cervical polypectomies, colonoscopies.  Pacemaker - 2019


Past Anesthesia/Blood Transfusion Reactions: Motion Sickness, Postoperative 

Nausea & Vomiting (PONV)


Additional Past Anesthesia/Blood Transfusion Reaction / Comment(s): Pt believes 

she recieved blood with her debulking surgery.


Type of Cardiac Device: Permanent Pacemaker


Device Placement Date:: 2019


Past Psychological History: No Psychological Hx Reported


Additional Psychological History / Comment(s): Pt resides with her spouse.  She 

is independent.


Smoking Status: Never smoker


Past Alcohol Use History: Rare


Past Drug Use History: None Reported





- Past Family History


  ** Mother


Family Medical History: AFIB, Chest Pain / Angina, Diabetes Mellitus, 

Hypertension


Additional Family Medical History / Comment(s): Mother is 92 yrs old.





  ** Father


Family Medical History: Cancer


Additional Family Medical History / Comment(s): Father  of lung cancer at 

the age of 78yrs.





Medications and Allergies


                                Home Medications











 Medication  Instructions  Recorded  Confirmed  Type


 


Magnesium Oxide [Mag-Ox] 250 mg PO TID #1 tablet 01/09/19 10/22/19 Rx


 


Apixaban [Eliquis] 2.5 mg PO BID 03/15/19 10/22/19 History


 


Digoxin [Lanoxin] 125 mcg PO DAILY 07/03/19 10/22/19 History


 


Spironolactone 25 mg PO DAILY 07/03/19 10/22/19 History


 


Dronabinol [Marinol] 5 mg PO HS PRN 10/22/19 10/22/19 History


 


Esomeprazole Magnesium [NexIUM 20 mg PO DAILY 10/22/19 10/22/19 History





24Hr]    


 


Furosemide [Lasix] 20 mg PO DAILY PRN 10/22/19 10/22/19 History


 


HYDROcodone/APAP 10-325MG [Norco 1 tab PO QID PRN 10/22/19 10/22/19 History





]    


 


Metoprolol Tartrate [Lopressor] 25 mg PO BID 10/22/19 10/22/19 History


 


Prochlorperazine [Compazine] 10 mg PO Q6H PRN 10/22/19 10/22/19 History








                                    Allergies











Allergy/AdvReac Type Severity Reaction Status Date / Time


 


hydromorphone [From Dilaudid] AdvReac  Hallucinati Verified 10/22/19 10:34





   ons  














Physical Exam


Vitals: 


                                   Vital Signs











  Temp Pulse Pulse Pulse Resp BP BP


 


 10/23/19 05:26  97.4 F L   65   18   100/61


 


 10/22/19 20:57  97.4 F L    87  18   96/64


 


 10/22/19 16:00     59 L  14  


 


 10/22/19 15:19  97.5 F L    59 L  14   99/52


 


 10/22/19 14:00   70    16  95/65 


 


 10/22/19 13:00   59 L    14  97/58 


 


 10/22/19 12:00   84    15  104/65 


 


 10/22/19 11:11   80    16  


 


 10/22/19 10:43  97.6 F  82    16  104/65 














  Pulse Ox


 


 10/23/19 05:26  93 L


 


 10/22/19 20:57  95


 


 10/22/19 16:00 


 


 10/22/19 15:19  91 L


 


 10/22/19 14:00  96


 


 10/22/19 13:00  95


 


 10/22/19 12:00  95


 


 10/22/19 11:11 


 


 10/22/19 10:43  98








                                Intake and Output











 10/22/19 10/23/19 10/23/19





 22:59 06:59 14:59


 


Intake Total  600 


 


Balance  600 


 


Intake:   


 


  Intake, IV Titration  600 





  Amount   


 


    Sodium Chloride 0.9% 1,  600 





    000 ml @ 75 mls/hr IV .   





    [id number] [facility] Rx#:[id number]   


 


Other:   


 


  Voiding Method Toilet  





 Bedside Commode  


 


  # Voids 1  














Results


CBC & Chem 7: 


                                 10/22/19 10:59





                                 10/22/19 10:59


Labs: 


                  Abnormal Lab Results - Last 24 Hours (Table)











  10/22/19 10/22/19 10/22/19 Range/Units





  10:59 10:59 19:39 


 


MCV  101.8 H    (80.0-100.0)  fL


 


Lymphocytes #  0.5 L    (1.0-4.8)  k/uL


 


Sodium   135 L   (137-145)  mmol/L


 


Chloride   93 L   ()  mmol/L


 


BUN   42 H   (7-17)  mg/dL


 


Creatinine   1.15 H   (0.52-1.04)  mg/dL


 


Glucose   108 H   (74-99)  mg/dL


 


Albumin   3.3 L   (3.5-5.0)  g/dL


 


Urine Appearance    Cloudy H  (Clear)  


 


Urine Protein    1+ H  (Negative)  


 


Urine Ketones    1+ H  (Negative)  


 


Urine Bilirubin    1+ H  (Negative)  


 


Ur Leukocyte Esterase    Small H  (Negative)  


 


Urine WBC    14 H  (0-5)  /hpf


 


Ur Squamous Epith Cells    6 H  (0-4)  /hpf


 


Urine Bacteria    Rare H  (None)  /hpf


 


Hyaline Casts    688 H  (0-2)  /lpf


 


Urine Mucus    Many H  (None)  /hpf














Thrombosis Risk Factor Assmnt





- Choose All That Apply


Each Risk Factor Represents 2 Points: Age 61-74 years


Each Risk Factor Represents 3 Points: Family history of DVT/PE


Thrombosis Risk Factor Assessment Total Risk Factor Score: 5


Thrombosis Risk Factor Assessment Level: High Risk

## 2019-10-23 NOTE — XR
EXAMINATION TYPE: XR chest 2V

 

DATE OF EXAM: 10/23/2019

 

COMPARISON: 10/15/2019

 

HISTORY: Pleural effusion

 

TECHNIQUE:  Frontal and lateral views of the chest are obtained.

 

FINDINGS:  There is bilateral pleural effusions and larger on the right side. There is left axillary 
pacemaker noted. There is no definite heart failure. Bony thorax is intact.

 

IMPRESSION:  Bilateral moderate pleural effusions are increased compared to last exam. No obvious hea
rt failure. Possible mild cardiomegaly.

## 2019-10-24 VITALS — SYSTOLIC BLOOD PRESSURE: 93 MMHG | DIASTOLIC BLOOD PRESSURE: 61 MMHG | TEMPERATURE: 97.6 F | HEART RATE: 58 BPM

## 2019-10-24 RX ADMIN — APIXABAN SCH MG: 2.5 TABLET, FILM COATED ORAL at 08:33

## 2019-10-24 RX ADMIN — METOPROLOL TARTRATE SCH MG: 25 TABLET, FILM COATED ORAL at 08:34

## 2019-10-24 RX ADMIN — CEFAZOLIN SCH: 330 INJECTION, POWDER, FOR SOLUTION INTRAMUSCULAR; INTRAVENOUS at 07:15

## 2019-10-25 NOTE — P.PN
Subjective


Progress Note Date: 10/24/19





The patient states that she feels slightly better with IV hydration as well as 

antibiotics.  She however remains very weak.  Pain is well controlled at this 

time.





Objective





- Vital Signs


Vital signs: 


                                   Vital Signs











Temp  97.6 F   10/24/19 12:22


 


Pulse  58 L  10/24/19 12:22


 


Resp  16   10/24/19 12:22


 


BP  93/61   10/24/19 12:22


 


Pulse Ox  99   10/24/19 12:22








                                 Intake & Output











 10/24/19 10/25/19 10/25/19





 18:59 06:59 18:59


 


Intake Total 2760  


 


Output Total 4  


 


Balance 2756  


 


Intake:   


 


  Oral 2760  


 


Output:   


 


  Urine 4  


 


Other:   


 


  Voiding Method Toilet  





 Bedside Commode  


 


  # Voids 1  














- Constitutional


General appearance: Present: no acute distress





- EENT


Eyes: Present: EOMI


ENT: Present: hearing grossly normal, normal oropharynx





- Respiratory


Respiratory: right: diminished





- Cardiovascular


Rhythm: regular


Heart sounds: normal: S1, S2





- Gastrointestinal


General gastrointestinal: Present: decreased bowel sounds, distended





- Integumentary


Integumentary: Present: normal





- Neurologic


Neurologic: Present: CNII-XII intact





- Musculoskeletal


Musculoskeletal: Present: generalized weakness, strength equal bilaterally





- Psychiatric


Psychiatric: Present: A&O x's 3, appropriate affect





- Labs


CBC & Chem 7: 


                                 10/22/19 10:59





                                 10/22/19 10:59





Assessment and Plan


(1) Nausea & vomiting


Narrative/Plan: 


The patient is symptomatically improved with antibiotics and bowel rest.  She 

had abdominal x-ray which shows evidence of ileus versus obstruction as the 

cause.


Status: Acute   Code(s): R11.2 - NAUSEA WITH VOMITING, UNSPECIFIED   SNOMED 

Code(s): 66810863


   





(2) Dehydration


Narrative/Plan: 


The patient is clinically improved and feels somewhat stronger with IV 

hydration.  


Status: Acute   Code(s): E86.0 - DEHYDRATION   SNOMED Code(s): 42462406


   





(3) Ovarian cancer


Narrative/Plan: 


The patient has progressive disease and progressive symptoms.  The findings on 

abdominal x-ray representing ileus/obstruction are a manifestation of the same. 

The case was discussed in detail with her primary oncologist Dr. Hillman.  He had 

confirmed that the patient's performance status had been quite poor even when 

she had been seen in the office.  Single agent carboplatin had been recommended 

as it was felt that the patient would not really be able to tolerate any regimen

that was stronger.  However she had had this chemotherapy at least twice before,

the chances of any clinical benefit from this would be quite slim.  Given her 

poor performance status, there are significant doubts about her ability to even 

handle this regimen.


 At this time, with the patient's performance status declining even further due 

to progressive disease, it is very unlikely that she would be able to tolerate 

any aggressive treatment.  The chances of severe adverse events would be quite 

high in a clinical benefit quite low.  Therefore he was in agreement that at 

this time it would be reasonable to consider comfort care.


 The above was discussed with the patient.  She expressed understanding and a

greement, and was agreeable to hospice consult.  This has been placed.  Case was

also discussed with the admitting service.  The patient will be discharged home 

on hospice.,  Once arrangements are made.


Status: Acute   Priority: Medium   Code(s): C56.9 - MALIGNANT NEOPLASM OF 

UNSPECIFIED OVARY   SNOMED Code(s): 130464962


   





(4) Pleural effusion


Narrative/Plan: 


There appears to be some increase in bilateral pleural effusions.  However the 

patient's symptoms are reasonably controlled at this time with medications and 

oxygen.  She is proceeding with comfort care as noted above


Status: Acute   Code(s): J90 - PLEURAL EFFUSION, NOT ELSEWHERE CLASSIFIED   

SNOMED Code(s): 30842796

## 2020-01-21 NOTE — XR
DM s -Stressed the importance of keeping blood sugars under control and regular visits with PCP. -Explained the possible effects of poorly controlled diabetes and the damage that diabetes can cause to ocular health. -Pt instructed to contact our office with any vision changes. Hyperopia-Discussed diagnosis with patient. Astigmatism-Discussed diagnosis with patient. Presbyopia-Discussed diagnosis with patient. Updated spec Rx given.; Dr's Notes: Dr. Varlea [facility] [id number] EXAMINATION TYPE: XR chest 2V

 

DATE OF EXAM: 7/3/2019

 

COMPARISON: 6/7/2019

 

TECHNIQUE: PA and lateral views submitted.

 

HISTORY: Difficulty breathing

 

FINDINGS:

Cardiac lead cardiac device seen. The right lower lobe consolidation small effusion. Tiny left effusi
on with basilar subsegmental consolidation. Arthropathy of the shoulders. No overt failure.

 

IMPRESSION:

1. Right sided consolidation and pleural effusion. Correlate for pneumonia.